# Patient Record
Sex: FEMALE | Race: WHITE | NOT HISPANIC OR LATINO | Employment: FULL TIME | URBAN - METROPOLITAN AREA
[De-identification: names, ages, dates, MRNs, and addresses within clinical notes are randomized per-mention and may not be internally consistent; named-entity substitution may affect disease eponyms.]

---

## 2018-02-23 ENCOUNTER — OFFICE VISIT (OUTPATIENT)
Dept: PEDIATRICS | Facility: CLINIC | Age: 33
End: 2018-02-23
Payer: COMMERCIAL

## 2018-02-23 ENCOUNTER — RECORDS - HEALTHEAST (OUTPATIENT)
Dept: ADMINISTRATIVE | Facility: OTHER | Age: 33
End: 2018-02-23

## 2018-02-23 VITALS
HEIGHT: 64 IN | SYSTOLIC BLOOD PRESSURE: 132 MMHG | BODY MASS INDEX: 26.7 KG/M2 | DIASTOLIC BLOOD PRESSURE: 77 MMHG | WEIGHT: 156.4 LBS | TEMPERATURE: 98.8 F

## 2018-02-23 DIAGNOSIS — Z00.00 ROUTINE GENERAL MEDICAL EXAMINATION AT A HEALTH CARE FACILITY: Primary | ICD-10-CM

## 2018-02-23 DIAGNOSIS — L30.9 ECZEMA, UNSPECIFIED TYPE: ICD-10-CM

## 2018-02-23 LAB
HPV_EXT - HISTORICAL: NORMAL
PAP SMEAR - HIM PATIENT REPORTED: NORMAL

## 2018-02-23 PROCEDURE — 99395 PREV VISIT EST AGE 18-39: CPT | Performed by: NURSE PRACTITIONER

## 2018-02-23 PROCEDURE — G0145 SCR C/V CYTO,THINLAYER,RESCR: HCPCS | Performed by: NURSE PRACTITIONER

## 2018-02-23 PROCEDURE — 87624 HPV HI-RISK TYP POOLED RSLT: CPT | Performed by: NURSE PRACTITIONER

## 2018-02-23 RX ORDER — TRIAMCINOLONE ACETONIDE 1 MG/G
1 OINTMENT TOPICAL DAILY
Qty: 30 G | Refills: 3 | Status: SHIPPED | OUTPATIENT
Start: 2018-02-23 | End: 2021-07-15

## 2018-02-23 NOTE — PROGRESS NOTES
SUBJECTIVE:   CC: Arlyn Escalera is an 33 year old woman who presents for preventive health visit.     Physical   Annual:     Getting at least 3 servings of Calcium per day::  Yes    Bi-annual eye exam::  Yes    Dental care twice a year::  NO    Sleep apnea or symptoms of sleep apnea::  None    Diet::  Regular (no restrictions)    Frequency of exercise::  4-5 days/week    Duration of exercise::  45-60 minutes    Taking medications regularly::  Yes    Medication side effects::  None    Additional concerns today::  YES            Hx of eczmea and had been seen by derm and treated with triamcinolone. Would like refill.     Is not on the pill, just got  and declines birth control.     Just got  last fall. Works at [employer]. Does [place], likes this for exercise.     Today's PHQ-2 Score:   PHQ-2 ( 1999 Pf[employer]) 2/23/2018   Q1: Little interest or pleasure in doing things 0   Q2: Feeling down, depressed or hopeless 0   PHQ-2 Score 0   Q1: Little interest or pleasure in doing things Not at all   Q2: Feeling down, depressed or hopeless Not at all   PHQ-2 Score 0       Abuse: Current or Past(Physical, Sexual or Emotional)- No  Do you feel safe in your environment - Yes    Social History   Substance Use Topics     Smoking status: Never Smoker     Smokeless tobacco: Never Used     Alcohol use 0.0 oz/week     0 Standard drinks or equivalent per week      Comment: socially     Alcohol Use 2/23/2018   If you drink alcohol, do you typically have greater than 3 drinks per day OR greater than 7 drinks per week?   No   No flowsheet data found.    Reviewed orders with patient.  Reviewed health maintenance and updated orders accordingly - Yes  Labs reviewed in EPIC    Mammogram not appropriate for this patient based on age.    Pertinent mammograms are reviewed under the imaging tab.  History of abnormal Pap smear: NO - age 30-65 PAP every 5 years with negative HPV co-testing recommended    Reviewed and updated as needed this  "visit by clinical staff         Reviewed and updated as needed this visit by Provider            Review of Systems  C: NEGATIVE for fever, chills, change in weight  I: NEGATIVE for worrisome rashes, moles or lesions  E: NEGATIVE for vision changes or irritation  ENT: NEGATIVE for ear, mouth and throat problems  R: NEGATIVE for significant cough or SOB  B: NEGATIVE for masses, tenderness or discharge  CV: NEGATIVE for chest pain, palpitations or peripheral edema  GI: NEGATIVE for nausea, abdominal pain, heartburn, or change in bowel habits  : NEGATIVE for unusual urinary or vaginal symptoms. Periods are regular.  M: NEGATIVE for significant arthralgias or myalgia  N: NEGATIVE for weakness, dizziness or paresthesias  P: NEGATIVE for changes in mood or affect     OBJECTIVE:   /77  Temp 98.8  F (37.1  C) (Tympanic)  Ht 5' 3.5\" (1.613 m)  Wt 156 lb 6.4 oz (70.9 kg)  LMP 01/30/2018  BMI 27.27 kg/m2  Physical Exam  GENERAL: healthy, alert and no distress  EYES: Eyes grossly normal to inspection, PERRL and conjunctivae and sclerae normal  HENT: ear canals and TM's normal, nose and mouth without ulcers or lesions  NECK: no adenopathy, no asymmetry, masses, or scars and thyroid normal to palpation  RESP: lungs clear to auscultation - no rales, rhonchi or wheezes  CV: regular rate and rhythm, normal S1 S2, no S3 or S4, no murmur, click or rub, no peripheral edema and peripheral pulses strong  ABDOMEN: soft, nontender, no hepatosplenomegaly, no masses and bowel sounds normal   (female): normal female external genitalia, normal urethral meatus, vaginal mucosa, normal cervix/adnexa/uterus without masses or discharge  MS: no gross musculoskeletal defects noted, no edema  SKIN: no suspicious lesions or rashes  PSYCH: mentation appears normal, affect normal/bright    ASSESSMENT/PLAN:   1. Routine general medical examination at a health care facility    - Pap imaged thin layer screen with HPV - recommended age 30 - " "65  - HPV High Risk Types DNA Cervical    2. Eczema, unspecified type  follow up with derm as scheduled.   - triamcinolone (KENALOG) 0.1 % ointment; Apply 1 g topically daily  Dispense: 30 g; Refill: 3    COUNSELING:  Reviewed preventive health counseling, as reflected in patient instructions  Special attention given to:        Regular exercise       Healthy diet/nutrition         reports that she has never smoked. She has never used smokeless tobacco.    Estimated body mass index is 27.27 kg/(m^2) as calculated from the following:    Height as of this encounter: 5' 3.5\" (1.613 m).    Weight as of this encounter: 156 lb 6.4 oz (70.9 kg).       Counseling Resources:  ATP IV Guidelines  Pooled Cohorts Equation Calculator  Breast Cancer Risk Calculator  FRAX Risk Assessment  ICSI Preventive Guidelines  Dietary Guidelines for Americans, 2010  USDA's MyPlate  ASA Prophylaxis  Lung CA Screening    IVA [facility]  "

## 2018-02-23 NOTE — MR AVS SNAPSHOT
After Visit Summary   2/23/2018    Arlyn Escalera    MRN: [id number]           Patient Information     Date Of Birth          1985        Visit Information        Provider Department      2/23/2018 1:15 PM Nilam Conde APRN [facility]        Today's Diagnoses     Routine general medical examination at a health care facility    -  1    Eczema, unspecified type          Care Instructions      Preventive Health Recommendations  Female Ages 26 - 39  Yearly exam:   See your health care provider every year in order to    Review health changes.     Discuss preventive care.      Review your medicines if you your doctor has prescribed any.    Until age 30: Get a Pap test every three years (more often if you have had an abnormal result).    After age 30: Talk to your doctor about whether you should have a Pap test every 3 years or have a Pap test with HPV screening every 5 years.   You do not need a Pap test if your uterus was removed (hysterectomy) and you have not had cancer.  You should be tested each year for STDs (sexually transmitted diseases), if you're at risk.   Talk to your provider about how often to have your cholesterol checked.  If you are at risk for diabetes, you should have a diabetes test (fasting glucose).  Shots: Get a flu shot each year. Get a tetanus shot every 10 years.   Nutrition:     Eat at least 5 servings of fruits and vegetables each day.    Eat whole-grain bread, whole-wheat pasta and brown rice instead of white grains and rice.    Talk to your provider about Calcium and Vitamin D.     Lifestyle    Exercise at least 150 minutes a week (30 minutes a day, 5 days of the week). This will help you control your weight and prevent disease.    Limit alcohol to one drink per day.    No smoking.     Wear sunscreen to prevent skin cancer.    See your dentist every six months for an exam and cleaning.            Follow-ups after your visit        Your next 10  "appointments already scheduled     Mar 29, 2018 12:15 PM CDT   (Arrive by 12:00 PM)   Return Visit with Jerome Cohen MD   [facility] ([facility])    [address] [address] [phone]              Who to contact     If you have questions or need follow up information about today's clinic visit or your schedule please contact [facility] directly at [phone].  Normal or non-critical lab and imaging results will be communicated to you by iConnect CRMhart, letter or phone within 4 business days after the clinic has received the results. If you do not hear from us within 7 days, please contact the clinic through iConnect CRMhart or phone. If you have a critical or abnormal lab result, we will notify you by phone as soon as possible.  Submit refill requests through SEDLine or call your pharmacy and they will forward the refill request to us. Please allow 3 business days for your refill to be completed.          Additional Information About Your Visit        iConnect CRMhart Information     SEDLine gives you secure access to your electronic health record. If you see a primary care provider, you can also send messages to your care team and make appointments. If you have questions, please call your primary care clinic.  If you do not have a primary care provider, please call [phone] and they will assist you.        Care EveryWhere ID     This is your Care EveryWhere ID. This could be used by other organizations to access your [facility] medical records  [id number]        Your Vitals Were     Temperature Height Last Period BMI (Body Mass Index)          98.8  F (37.1  C) (Tympanic) 5' 3.5\" (1.613 m) 01/30/2018 27.27 kg/m2         Blood Pressure from Last 3 Encounters:   02/23/18 132/77   12/15/16 120/80   03/09/16 126/79    Weight from Last 3 Encounters:   02/23/18 156 lb 6.4 oz (70.9 kg)   12/15/16 159 lb 8 oz (72.3 kg)   03/09/16 147 lb (66.7 kg)    "           We Performed the Following     HPV High Risk Types DNA Cervical     Pap imaged thin layer screen with HPV - recommended age 30 - 65          Today's Medication Changes          These changes are accurate as of 2/23/18  1:43 PM.  If you have any questions, ask your nurse or doctor.               These medicines have changed or have updated prescriptions.        Dose/Directions    triamcinolone 0.1 % ointment   Commonly known as:  KENALOG   This may have changed:  how much to take   Used for:  Eczema, unspecified type   Changed by:  Nilam Conde APRN CNP        Dose:  1 g   Apply 1 g topically daily   Quantity:  30 g   Refills:  3            Where to get your medicines      These medications were sent to [facility] [place] [facility] [place] [place] - [address]   [address], [place] [address]     Phone:  [phone]     triamcinolone 0.1 % ointment                Primary Care Provider Office Phone # Fax #    IVA Beltran [phone]       [address] [place] [address]        Equal Access to Services     [facility]: Hadii aad ku hadasho Soomaali, waaxda luqadaha, qaybta kaalmada adeegyada, waxay mayur sanches . So [facility] [phone].    ATENCIÓN: Si habla español, tiene a leonard disposición servicios gratuitos de asistencia lingüística. Llame al [phone].    We comply with applicable federal civil rights laws and [place] laws. We do not discriminate on the basis of race, color, national origin, age, disability, sex, sexual orientation, or gender identity.            Thank you!     Thank you for choosing [facility]  for your care. Our goal is always to provide you with excellent care. Hearing back from our patients is one way we can continue to improve our services. Please take a few minutes to complete the written survey that you may receive in the mail after your visit with us.  Thank you!             Your Updated Medication List - Protect others around you: Learn how to safely use, store and throw away your medicines at www.disposemymeds.org.          This list is accurate as of 2/23/18  1:43 PM.  Always use your most recent med list.                   Brand Name Dispense Instructions for use Diagnosis    eucerin cream     80 g    Apply topically as needed for dry skin Or whatever size covered by insurance    Eczema       fluocinonide 0.05 % solution    LIDEX    60 mL    Apply topicaly twice daily as needed for scalp itching/dermatitis.    Other eczema       fluticasone 50 MCG/ACT spray    FLONASE    1 g    Spray 2 sprays into both nostrils daily    Allergic rhinitis due to pollen       hydrOXYzine 25 MG tablet    ATARAX    60 tablet    Take 1-2 tablets (25-50 mg) by mouth every 6 hours as needed for itching    Rash, Eczema, unspecified eczema       LORazepam 0.5 MG tablet    ATIVAN    30 Tab    ONE TABLET AS NEEDED FOR PANIC ATTACKS.  USE SPARINGLY AND ONLY AS NEEDED.    Panic attack       norethindrone-ethinyl estradiol 1-20 MG-MCG per tablet    MICROGESTIN 1/20    90 tablet    Take 1 tablet by mouth daily    Encounter for surveillance of contraceptive pills       triamcinolone 0.1 % ointment    KENALOG    30 g    Apply 1 g topically daily    Eczema, unspecified type

## 2018-02-27 LAB
COPATH REPORT: NORMAL
PAP: NORMAL

## 2018-03-01 LAB
FINAL DIAGNOSIS: NORMAL
HPV HR 12 DNA CVX QL NAA+PROBE: NEGATIVE
HPV16 DNA SPEC QL NAA+PROBE: NEGATIVE
HPV18 DNA SPEC QL NAA+PROBE: NEGATIVE
SPECIMEN DESCRIPTION: NORMAL
SPECIMEN SOURCE CVX/VAG CYTO: NORMAL

## 2018-11-23 ENCOUNTER — OFFICE VISIT (OUTPATIENT)
Dept: DERMATOLOGY | Facility: CLINIC | Age: 33
End: 2018-11-23
Payer: COMMERCIAL

## 2018-11-23 ENCOUNTER — RECORDS - HEALTHEAST (OUTPATIENT)
Dept: ADMINISTRATIVE | Facility: OTHER | Age: 33
End: 2018-11-23

## 2018-11-23 DIAGNOSIS — L20.84 INTRINSIC ECZEMA: ICD-10-CM

## 2018-11-23 DIAGNOSIS — L30.8 OTHER ECZEMA: ICD-10-CM

## 2018-11-23 DIAGNOSIS — L29.9 GENERALIZED PRURITUS: Primary | ICD-10-CM

## 2018-11-23 DIAGNOSIS — L20.84 INTRINSIC ATOPIC DERMATITIS: Primary | ICD-10-CM

## 2018-11-23 DIAGNOSIS — R21 RASH: ICD-10-CM

## 2018-11-23 RX ORDER — FLUOCINONIDE TOPICAL SOLUTION USP, 0.05% 0.5 MG/ML
SOLUTION TOPICAL
Qty: 60 ML | Refills: 2 | Status: SHIPPED | OUTPATIENT
Start: 2018-11-23 | End: 2021-07-15

## 2018-11-23 RX ORDER — HYDROXYZINE HYDROCHLORIDE 25 MG/1
25-50 TABLET, FILM COATED ORAL EVERY 6 HOURS PRN
Qty: 60 TABLET | Refills: 1 | Status: SHIPPED | OUTPATIENT
Start: 2018-11-23 | End: 2018-11-23

## 2018-11-23 ASSESSMENT — PAIN SCALES - GENERAL: PAINLEVEL: NO PAIN (0)

## 2018-11-23 NOTE — NURSING NOTE
"Chief Complaint   Patient presents with     Derm Problem     Arlyn is here today for eczema. She states \" a few weeks ago my skin started to flare. It is mostly on my back\"     TAISHA Bernard  "

## 2018-11-23 NOTE — MR AVS SNAPSHOT
After Visit Summary   11/23/2018    Arlyn Escalera    MRN: [id number]           Patient Information     Date Of Birth          1985        Visit Information        Provider Department      11/23/2018 12:30 PM Zeynep Arellano PA-C [facility]         Follow-ups after your visit        Your next 10 appointments already scheduled     Nov 27, 2018  1:00 PM CST   Nurse Only with EA PRENATAL NURSE   [facility] ([facility])    [address] [place] [address]  [address]  [facility] [address] [phone]            Dec 14, 2018  2:30 PM CST   (Arrive by 2:15 PM)   Return Visit with [facility] ([facility])    [address]  [address] [facility] [address] [facility] [address] [phone]              Who to contact     Please call your clinic at [phone] to:    Ask questions about your health    Make or cancel appointments    Discuss your medicines    Learn about your test results    Speak to your doctor            Additional Information About Your Visit        MyChart Information     [employer] gives you secure access to your electronic health record. If you see a primary care provider, you can also send messages to your care team and make appointments. If you have questions, please call your primary care clinic.  If you do not have a primary care provider, please call [phone] and they will assist you.      [employer] is an electronic gateway that provides easy, online access to your medical records. With [employer], you can request a clinic appointment, read your test results, renew a prescription or communicate with your care team.     To access your existing account, please contact your [facility] or call [phone] for assistance.        Care EveryWhere ID     This is your Care EveryWhere ID. This could be used by other organizations to access your [facility]  records  [id number]         Blood Pressure from Last 3 Encounters:   02/23/18 132/77   12/15/16 120/80   03/09/16 126/79    Weight from Last 3 Encounters:   02/23/18 70.9 kg (156 lb 6.4 oz)   12/15/16 72.3 kg (159 lb 8 oz)   03/09/16 66.7 kg (147 lb)              Today, you had the following     No orders found for display         Today's Medication Changes          These changes are accurate as of 11/23/18  1:02 PM.  If you have any questions, ask your nurse or doctor.               Stop taking these medicines if you haven't already. Please contact your care team if you have questions.     hydrOXYzine 25 MG tablet   Commonly known as:  ATARAX   Stopped by:  Zeynep Arellano PA-C                Where to get your medicines      These medications were sent to [facility] - [address]   [address], [place] [address]     Phone:  [phone]     eucerin cream    fluocinonide 0.05 % solution                Primary Care Provider Office Phone # Fax #    IVA Beltran [phone]       [address] [place] [address]        Equal Access to Services     [facility]: Hadii aad ku hadasho Soomaali, waaxda luqadaha, qaybta kaalmada adeegyada, waxay supriyain hayannemarien josr sanches . So [facility] [phone].    ATENCIÓN: Si habla español, tiene a leonard disposición servicios gratuitos de asistencia lingüística. Llame al [phone].    We comply with applicable federal civil rights laws and [place] laws. We do not discriminate on the basis of race, color, national origin, age, disability, sex, sexual orientation, or gender identity.            Thank you!     Thank you for choosing [facility]  for your care. Our goal is always to provide you with excellent care. Hearing back from our patients is one way we can continue to improve our services. Please take a few minutes to complete the written survey that you  may receive in the mail after your visit with us. Thank you!             Your Updated Medication List - Protect others around you: Learn how to safely use, store and throw away your medicines at www.disposemymeds.org.          This list is accurate as of 11/23/18  1:02 PM.  Always use your most recent med list.                   Brand Name Dispense Instructions for use Diagnosis    eucerin cream     454 g    Apply topically as needed for dry skin Or whatever size covered by insurance    Intrinsic eczema       fluocinonide 0.05 % solution    LIDEX    60 mL    Apply topicaly twice daily as needed for scalp itching/dermatitis.    Other eczema       fluticasone 50 MCG/ACT spray    FLONASE    1 g    Spray 2 sprays into both nostrils daily    Allergic rhinitis due to pollen       LORazepam 0.5 MG tablet    ATIVAN    30 Tab    ONE TABLET AS NEEDED FOR PANIC ATTACKS.  USE SPARINGLY AND ONLY AS NEEDED.    Panic attack       norethindrone-ethinyl estradiol 1-20 MG-MCG per tablet    MICROGESTIN 1/20    90 tablet    Take 1 tablet by mouth daily    Encounter for surveillance of contraceptive pills       triamcinolone 0.1 % ointment    KENALOG    30 g    Apply 1 g topically daily    Eczema, unspecified type

## 2018-11-23 NOTE — LETTER
"11/23/2018       RE: Arlyn Escalera  [address] [place] [address]     Dear Colleague,    Thank you for referring your patient, Arlyn Escalera, to the [facility] DERMATOLOGY at [facility]. Please see a copy of my visit note below.    [facility] Dermatology Note      Dermatology Problem List:  1.Atopic Dermatitis: Lidex 0.05% solution, TMC 0.1% ointment, Eucerin cream   - Previous Tx:  OTC hydrocortisone, Eucerin cream  2. Currently 7 weeks pregnant - first pregnancy    Currently need to avoid hydroxyzine due to being in 1st trimester of pregnancy    Encounter Date: Nov 23, 2018    CC:  Chief Complaint   Patient presents with     Derm Problem     Arlyn is here today for eczema. She states \" a few weeks ago my skin started to flare. It is mostly on my back\"         History of Present Illness:  Ms. Arlyn Escalera is a 33 year old female who presents as a follow-up for eczema. The patient was last seen 03/16/2016 when the patient was recommended to continue her triamcinolone 0.1% ointment then apply Eucerin afterwards, and to continue applying Hydrocortisone Cream OTC to face for active lesion. At today's visit the patient states that a few weeks ago she noticed her skin started to flare. She has noticed these flares all around her body but mostly on the back. The patient states she usually sees these symptoms in January and February, but because the weather got colder earlier she thinks that is flaring her. She is currently 7 weeks pregnant with first baby. The patient has been using Eucerin, OTC Hydrocortisone, and natalee. She states that they hydrocortisone has not been very helpful. She is using a body wash from  [employer] but it is not the free and clear she usually uses. She has a history of seasonal allergies. Patient states that she is 7 weeks pregnant and was wondering if any of the medications that will be prescribed will effect her pregnancy. The " patient denies painful, itching, tingling or bleeding lesions unless otherwise noted.      Past Medical History:   Patient Active Problem List   Diagnosis     CARDIOVASCULAR SCREENING; LDL GOAL LESS THAN 160     Allergic conjunctivitis     Seasonal allergies     Blepharitis, unspecified laterality     Eczema, unspecified type     Past Medical History:   Diagnosis Date     Anxiety     mostly in college     Spleen laceration 2002    hockey     Past Surgical History:   Procedure Laterality Date     APPENDECTOMY      appendectomy in 2000       Social History:   reports that she has never smoked. She has never used smokeless tobacco. She reports that she drinks alcohol. She reports that she does not use illicit drugs.    Family History:  Family History   Problem Relation Age of Onset     C.A.D. Father      MI mild at age 50     Seasonal/Environmental Allergies Father      Skin Cancer No family hx of      Glaucoma No family hx of      Macular Degeneration No family hx of      Melanoma No family hx of        Medications:  Current Outpatient Prescriptions   Medication Sig Dispense Refill     fluticasone (FLONASE) 50 MCG/ACT nasal spray Spray 2 sprays into both nostrils daily 1 g 5     LORAZEPAM 0.5 MG PO TABS ONE TABLET AS NEEDED FOR PANIC ATTACKS.  USE SPARINGLY AND ONLY AS NEEDED. 30 Tab 0     Skin Protectants, Misc. (EUCERIN) cream Apply topically as needed for dry skin Or whatever size covered by insurance 80 g 1     triamcinolone (KENALOG) 0.1 % ointment Apply 1 g topically daily 30 g 3     fluocinonide (LIDEX) 0.05 % external solution Apply topicaly twice daily as needed for scalp itching/dermatitis. (Patient not taking: Reported on 11/23/2018) 60 mL 5     hydrOXYzine (ATARAX) 25 MG tablet Take 1-2 tablets (25-50 mg) by mouth every 6 hours as needed for itching (Patient not taking: Reported on 11/23/2018) 60 tablet 1     norethindrone-ethinyl estradiol (MICROGESTIN 1/20) 1-20 MG-MCG per tablet Take 1 tablet by mouth  "daily (Patient not taking: Reported on 11/23/2018) 90 tablet prn       Allergies   Allergen Reactions     Keppra Unknown     Morphine [Morphine Sulfate] Unknown       Review of Systems:  -Constitutional: The patient denies fatigue, fevers, chills, unintended weight loss, and night sweats.  -Skin: As above in HPI. No additional skin concerns.    Physical exam:  Vitals: There were no vitals taken for this visit.  GEN: This is a well developed, well-nourished female in no acute distress, in a pleasant mood.    SKIN: Waist-up skin, which includes the head/face, neck, both arms, chest, back, abdomen, digits and/or nails was examined.  -There are pink scaly patches and plaques on the back, antecubital fossa, abdomen, posterior scalp and neck, bilateral thighs and popliteal fossa - excoriations on the back  -No other lesions of concern on areas examined.     Impression/Plan:  1. Atopic dermatitis, currently flaring - winter weather and possibly pregnancy may be playing a role    Refilled Lidex 0.05% solution, apply topically twice a daily as needed for scalp itching/dermatitis     Continue Eucerin cream, apply topically as needed for dry skin or whatever size covered by insurance     Discussed witching to Cetaphil soap and CeraVe moisturizer if she would like to try something else    Need to avoid hydroxyzine due to the fact that she is on her first trimester of pregnancy.    Reminded patient of SE of topical steroids and to avoid using them on large areas of the body    May use emollients BID or TID and then topical steroids only on \"hot spots\"     CC Dr. Salter on close of this encounter.  Follow-up in 2 to 3 weeks, earlier for new or changing lesions.       Staff Involved:  Staff/Scribe    Scribe Disclosure:  Angie CABRERA, am serving as a scribe to document services personally performed by Zeynep Arellano PA-C, based on data collection and the provider's statements to me.     Provider Disclosure:   The " documentation recorded by the scribe accurately reflects the services I personally performed and the decisions made by me.    All risks, benefits and alternatives were discussed with patient.  Patient is in agreement and understands the assessment and plan.  All questions were answered.    Zeynep Arellano PA-C  [facility]: Phone: [phone], Fax: [phone]

## 2018-11-23 NOTE — PROGRESS NOTES
"[facility] Dermatology Note      Dermatology Problem List:  1.Atopic Dermatitis: Lidex 0.05% solution, TMC 0.1% ointment, Eucerin cream   - Previous Tx:  OTC hydrocortisone, Eucerin cream  2. Currently 7 weeks pregnant - first pregnancy    Currently need to avoid hydroxyzine due to being in 1st trimester of pregnancy    Encounter Date: Nov 23, 2018    CC:  Chief Complaint   Patient presents with     Derm Problem     Arlyn is here today for eczema. She states \" a few weeks ago my skin started to flare. It is mostly on my back\"         History of Present Illness:  Ms. Arlyn Escalera is a 33 year old female who presents as a follow-up for eczema. The patient was last seen 03/16/2016 when the patient was recommended to continue her triamcinolone 0.1% ointment then apply Eucerin afterwards, and to continue applying Hydrocortisone Cream OTC to face for active lesion. At today's visit the patient states that a few weeks ago she noticed her skin started to flare. She has noticed these flares all around her body but mostly on the back. The patient states she usually sees these symptoms in January and February, but because the weather got colder earlier she thinks that is flaring her. She is currently 7 weeks pregnant with first baby. The patient has been using Eucerin, OTC Hydrocortisone, and natalee. She states that they hydrocortisone has not been very helpful. She is using a body wash from  [employer] but it is not the free and clear she usually uses. She has a history of seasonal allergies. Patient states that she is 7 weeks pregnant and was wondering if any of the medications that will be prescribed will effect her pregnancy. The patient denies painful, itching, tingling or bleeding lesions unless otherwise noted.      Past Medical History:   Patient Active Problem List   Diagnosis     CARDIOVASCULAR SCREENING; LDL GOAL LESS THAN 160     Allergic conjunctivitis     Seasonal allergies     Blepharitis, " unspecified laterality     Eczema, unspecified type     Past Medical History:   Diagnosis Date     Anxiety     mostly in college     Spleen laceration 2002    hockey     Past Surgical History:   Procedure Laterality Date     APPENDECTOMY      appendectomy in 2000       Social History:   reports that she has never smoked. She has never used smokeless tobacco. She reports that she drinks alcohol. She reports that she does not use illicit drugs.    Family History:  Family History   Problem Relation Age of Onset     C.A.D. Father      MI mild at age 50     Seasonal/Environmental Allergies Father      Skin Cancer No family hx of      Glaucoma No family hx of      Macular Degeneration No family hx of      Melanoma No family hx of        Medications:  Current Outpatient Prescriptions   Medication Sig Dispense Refill     fluticasone (FLONASE) 50 MCG/ACT nasal spray Spray 2 sprays into both nostrils daily 1 g 5     LORAZEPAM 0.5 MG PO TABS ONE TABLET AS NEEDED FOR PANIC ATTACKS.  USE SPARINGLY AND ONLY AS NEEDED. 30 Tab 0     Skin Protectants, Misc. (EUCERIN) cream Apply topically as needed for dry skin Or whatever size covered by insurance 80 g 1     triamcinolone (KENALOG) 0.1 % ointment Apply 1 g topically daily 30 g 3     fluocinonide (LIDEX) 0.05 % external solution Apply topicaly twice daily as needed for scalp itching/dermatitis. (Patient not taking: Reported on 11/23/2018) 60 mL 5     hydrOXYzine (ATARAX) 25 MG tablet Take 1-2 tablets (25-50 mg) by mouth every 6 hours as needed for itching (Patient not taking: Reported on 11/23/2018) 60 tablet 1     norethindrone-ethinyl estradiol (MICROGESTIN 1/20) 1-20 MG-MCG per tablet Take 1 tablet by mouth daily (Patient not taking: Reported on 11/23/2018) 90 tablet prn       Allergies   Allergen Reactions     Keppra Unknown     Morphine [Morphine Sulfate] Unknown       Review of Systems:  -Constitutional: The patient denies fatigue, fevers, chills, unintended weight loss, and  "night sweats.  -Skin: As above in HPI. No additional skin concerns.    Physical exam:  Vitals: There were no vitals taken for this visit.  GEN: This is a well developed, well-nourished female in no acute distress, in a pleasant mood.    SKIN: Waist-up skin, which includes the head/face, neck, both arms, chest, back, abdomen, digits and/or nails was examined.  -There are pink scaly patches and plaques on the back, antecubital fossa, abdomen, posterior scalp and neck, bilateral thighs and popliteal fossa - excoriations on the back  -No other lesions of concern on areas examined.     Impression/Plan:  1. Atopic dermatitis, currently flaring - winter weather and possibly pregnancy may be playing a role    Refilled Lidex 0.05% solution, apply topically twice a daily as needed for scalp itching/dermatitis     Continue Eucerin cream, apply topically as needed for dry skin or whatever size covered by insurance     Discussed witching to Cetaphil soap and CeraVe moisturizer if she would like to try something else    Need to avoid hydroxyzine due to the fact that she is on her first trimester of pregnancy.    Reminded patient of SE of topical steroids and to avoid using them on large areas of the body    May use emollients BID or TID and then topical steroids only on \"hot spots\"     CC Dr. Salter on close of this encounter.  Follow-up in 2 to 3 weeks, earlier for new or changing lesions.       Staff Involved:  Staff/Scribe    Scribe Disclosure:  I, Angie Aponte, am serving as a scribe to document services personally performed by Zeynep Arellano PA-C, based on data collection and the provider's statements to me.     Provider Disclosure:   The documentation recorded by the scribe accurately reflects the services I personally performed and the decisions made by me.    All risks, benefits and alternatives were discussed with patient.  Patient is in agreement and understands the assessment and plan.  All questions were " answered.    Zeynep Arellano PA-C  [facility]: Phone: [phone], Fax: [phone]

## 2018-11-27 ENCOUNTER — PRENATAL OFFICE VISIT (OUTPATIENT)
Dept: NURSING | Facility: CLINIC | Age: 33
End: 2018-11-27
Payer: COMMERCIAL

## 2018-11-27 VITALS
SYSTOLIC BLOOD PRESSURE: 110 MMHG | BODY MASS INDEX: 28 KG/M2 | WEIGHT: 164 LBS | HEIGHT: 64 IN | DIASTOLIC BLOOD PRESSURE: 50 MMHG | HEART RATE: 84 BPM

## 2018-11-27 DIAGNOSIS — Z34.01 ENCOUNTER FOR SUPERVISION OF NORMAL FIRST PREGNANCY IN FIRST TRIMESTER: Primary | ICD-10-CM

## 2018-11-27 LAB
ABO + RH BLD: NORMAL
ABO + RH BLD: NORMAL
ABORH_EXT (HISTORICAL CONVERSION): NORMAL
ANTIBODY_EXT (HISTORICAL CONVERSION): NEGATIVE
BLD GP AB SCN SERPL QL: NORMAL
BLOOD BANK CMNT PATIENT-IMP: NORMAL
ERYTHROCYTE [DISTWIDTH] IN BLOOD BY AUTOMATED COUNT: 11.4 % (ref 10–15)
HBSAG_EXT (HISTORICAL CONVERSION): NORMAL
HCT VFR BLD AUTO: 38 % (ref 35–47)
HGB BLD-MCNC: 13.5 G/DL (ref 11.7–15.7)
HGB_EXT (HISTORICAL CONVERSION): 13.5
HIV_EXT: NORMAL
MCH RBC QN AUTO: 29.3 PG (ref 26.5–33)
MCHC RBC AUTO-ENTMCNC: 35.5 G/DL (ref 31.5–36.5)
MCV RBC AUTO: 82 FL (ref 78–100)
PLATELET # BLD AUTO: 303 10E9/L (ref 150–450)
PLT_EXT - HISTORICAL: 303
RBC # BLD AUTO: 4.61 10E12/L (ref 3.8–5.2)
RPR - HISTORICAL: NORMAL
RUBELLA_EXT (HISTORICAL CONVERSION): NORMAL
SPECIMEN EXP DATE BLD: NORMAL
WBC # BLD AUTO: 15.1 10E9/L (ref 4–11)

## 2018-11-27 PROCEDURE — 86762 RUBELLA ANTIBODY: CPT | Performed by: ADVANCED PRACTICE MIDWIFE

## 2018-11-27 PROCEDURE — 87340 HEPATITIS B SURFACE AG IA: CPT | Performed by: ADVANCED PRACTICE MIDWIFE

## 2018-11-27 PROCEDURE — 86850 RBC ANTIBODY SCREEN: CPT | Performed by: ADVANCED PRACTICE MIDWIFE

## 2018-11-27 PROCEDURE — 86900 BLOOD TYPING SEROLOGIC ABO: CPT | Performed by: ADVANCED PRACTICE MIDWIFE

## 2018-11-27 PROCEDURE — 85027 COMPLETE CBC AUTOMATED: CPT | Performed by: ADVANCED PRACTICE MIDWIFE

## 2018-11-27 PROCEDURE — 87389 HIV-1 AG W/HIV-1&-2 AB AG IA: CPT | Performed by: ADVANCED PRACTICE MIDWIFE

## 2018-11-27 PROCEDURE — 36415 COLL VENOUS BLD VENIPUNCTURE: CPT | Performed by: ADVANCED PRACTICE MIDWIFE

## 2018-11-27 PROCEDURE — 87086 URINE CULTURE/COLONY COUNT: CPT | Performed by: ADVANCED PRACTICE MIDWIFE

## 2018-11-27 PROCEDURE — 86780 TREPONEMA PALLIDUM: CPT | Performed by: ADVANCED PRACTICE MIDWIFE

## 2018-11-27 PROCEDURE — 86901 BLOOD TYPING SEROLOGIC RH(D): CPT | Performed by: ADVANCED PRACTICE MIDWIFE

## 2018-11-27 PROCEDURE — 99207 ZZC NO CHARGE NURSE ONLY: CPT

## 2018-11-27 RX ORDER — PRENATAL VIT/IRON FUM/FOLIC AC 27MG-0.8MG
1 TABLET ORAL DAILY
Qty: 100 TABLET | Refills: 3
Start: 2018-11-27 | End: 2021-09-28

## 2018-11-27 NOTE — MR AVS SNAPSHOT
After Visit Summary   11/27/2018    Arlyn Escalera    MRN: [id number]           Patient Information     Date Of Birth          1985        Visit Information        Provider Department      11/27/2018 1:00 PM EA PRENATAL NURSE [facility]        Today's Diagnoses     Encounter for supervision of normal first pregnancy in first trimester    -  1       Follow-ups after your visit        Your next 10 appointments already scheduled     Dec 07, 2018 12:30 PM CST   Ultrasound with OXUS1   [facility]    [address] [id number] [phone]            Dec 14, 2018  2:30 PM CST   (Arrive by 2:15 PM)   Return Visit with Zeynep Arellano PA-C   [facility] ([facility])    [address] [place] [address]  3rd [facility] [address] [phone]            Dec 17, 2018  5:00 PM CST   New Prenatal with IVA Gaming CNM   [facility] ([facility])    [address] [facility]  [address]  [facility] [id number] [phone]              Future tests that were ordered for you today     Open Future Orders        Priority Expected Expires Ordered    US OB < 14 weeks, single,  for dating ([id number]) Routine  2/25/2019 11/27/2018            Who to contact     If you have questions or need follow up information about today's clinic visit or your schedule please contact [facility] directly at [phone].  Normal or non-critical lab and imaging results will be communicated to you by MyChart, letter or phone within 4 business days after the clinic has received the results. If you do not hear from us within 7 days, please contact the clinic through MyChart or phone. If you have a critical or abnormal lab result, we will notify you by phone as soon as possible.  Submit refill requests through [employer] or call your pharmacy and they will forward  "the refill request to us. Please allow 3 business days for your refill to be completed.          Additional Information About Your Visit        [employer] Information     [employer] gives you secure access to your electronic health record. If you see a primary care provider, you can also send messages to your care team and make appointments. If you have questions, please call your primary care clinic.  If you do not have a primary care provider, please call [phone] and they will assist you.        Care EveryWhere ID     This is your Care EveryWhere ID. This could be used by other organizations to access your [place] medical records  [id number]        Your Vitals Were     Pulse Height Last Period BMI (Body Mass Index)          84 5' 3.5\" (1.613 m) 10/04/2018 28.6 kg/m2         Blood Pressure from Last 3 Encounters:   11/27/18 110/50   02/23/18 132/77   12/15/16 120/80    Weight from Last 3 Encounters:   11/27/18 164 lb (74.4 kg)   02/23/18 156 lb 6.4 oz (70.9 kg)   12/15/16 159 lb 8 oz (72.3 kg)              We Performed the Following     ABO/Rh type and screen     CBC with platelets     Hepatitis B surface antigen     HIV Antigen Antibody Combo     Rubella Antibody IgG Quantitative     Treponema Abs w Reflex to RPR and Titer     Urine Culture Aerobic Bacterial          Today's Medication Changes          These changes are accurate as of 11/27/18  2:10 PM.  If you have any questions, ask your nurse or doctor.               Start taking these medicines.        Dose/Directions    pediatric multivitamin w/iron 27-0.8 MG tablet   Used for:  Encounter for supervision of normal first pregnancy in first trimester        Dose:  1 tablet   Take 1 tablet by mouth daily   Quantity:  100 tablet   Refills:  3            Where to get your medicines      Some of these will need a paper prescription and others can be bought over the counter.  Ask your nurse if you have questions.     You don't need a prescription for these " medications     pediatric multivitamin w/iron 27-0.8 MG tablet                Primary Care Provider Office Phone # Fax #    IVA Beltran [phone] [address] [place] [address]        Equal Access to Services     [facility] DIMAS : Hadii aad ku hadasho Soomaali, waaxda luqadaha, qaybta kaalmada adeegyada, waxay supriyain hayannemarien adejean khza verónica elizabeth. So [facility] [phone].    ATENCIÓN: Si habla español, tiene a leonard disposición servicios gratuitos de asistencia lingüística. Llame al [phone].    We comply with applicable federal civil rights laws and [place] laws. We do not discriminate on the basis of race, color, national origin, age, disability, sex, sexual orientation, or gender identity.            Thank you!     Thank you for choosing [facility]  for your care. Our goal is always to provide you with excellent care. Hearing back from our patients is one way we can continue to improve our services. Please take a few minutes to complete the written survey that you may receive in the mail after your visit with us. Thank you!             Your Updated Medication List - Protect others around you: Learn how to safely use, store and throw away your medicines at www.disposemymeds.org.          This list is accurate as of 11/27/18  2:10 PM.  Always use your most recent med list.                   Brand Name Dispense Instructions for use Diagnosis    eucerin cream     454 g    Apply topically as needed for dry skin Or whatever size covered by insurance    Intrinsic eczema       fluocinonide 0.05 % external solution    LIDEX    60 mL    Apply topicaly twice daily as needed for scalp itching/dermatitis.    Other eczema       fluticasone 50 MCG/ACT nasal spray    FLONASE    1 g    Spray 2 sprays into both nostrils daily    Allergic rhinitis due to pollen       pediatric multivitamin w/iron 27-0.8 MG tablet     100 tablet    Take 1 tablet by mouth daily     Encounter for supervision of normal first pregnancy in first trimester       triamcinolone 0.1 % external ointment    KENALOG    30 g    Apply 1 g topically daily    Eczema, unspecified type

## 2018-11-27 NOTE — PROGRESS NOTES
"Chief Complaint   Patient presents with     Prenatal Care     New Prenatal Nurse Visit   7w5d  Estimated Date of Delivery: Jul 11, 2019      Initial /50 (BP Location: Right arm, Cuff Size: Adult Regular)  Pulse 84  Ht 5' 3.5\" (1.613 m)  Wt 164 lb (74.4 kg)  LMP 10/04/2018  BMI 28.6 kg/m2 Estimated body mass index is 28.6 kg/(m^2) as calculated from the following:    Height as of this encounter: 5' 3.5\" (1.613 m).    Weight as of this encounter: 164 lb (74.4 kg).  BP completed using cuff size: regular    Patient is accompanied by . Prenatal book and folder (containing standard educational hand-outs and brochures) given to patient. Information in folder reviewed. Questions answered.     Discussed and gave written information on options available for 1st and 2nd trimester screening, CVS, amniocentesis, AFP, and QUAD screen plus optimal time-frame for testing. Brochure given on optional screening available to determine Cystic Fibrosis carrier status. Pt instructed to check with insurance regarding coverage of these optional tests. Pt advised to call back if she desires testing or has any questions or concerns.    Prenatal labs obtained. New prenatal visit scheduled on 12/17/18 with Lily Abbott.  Kinjal Mosqueda RN               "

## 2018-11-28 LAB
BACTERIA SPEC CULT: NORMAL
HBV SURFACE AG SERPL QL IA: NONREACTIVE
HIV 1+2 AB+HIV1 P24 AG SERPL QL IA: NONREACTIVE
RUBV IGG SERPL IA-ACNC: 9 IU/ML
SPECIMEN SOURCE: NORMAL
T PALLIDUM AB SER QL: NONREACTIVE

## 2018-12-07 ENCOUNTER — RECORDS - HEALTHEAST (OUTPATIENT)
Dept: ADMINISTRATIVE | Facility: OTHER | Age: 33
End: 2018-12-07

## 2018-12-07 DIAGNOSIS — Z34.01 ENCOUNTER FOR SUPERVISION OF NORMAL FIRST PREGNANCY IN FIRST TRIMESTER: ICD-10-CM

## 2018-12-17 ENCOUNTER — RECORDS - HEALTHEAST (OUTPATIENT)
Dept: ADMINISTRATIVE | Facility: OTHER | Age: 33
End: 2018-12-17

## 2018-12-17 ENCOUNTER — PRENATAL OFFICE VISIT (OUTPATIENT)
Dept: OBGYN | Facility: CLINIC | Age: 33
End: 2018-12-17
Payer: COMMERCIAL

## 2018-12-17 VITALS — SYSTOLIC BLOOD PRESSURE: 134 MMHG | BODY MASS INDEX: 28.63 KG/M2 | WEIGHT: 164.2 LBS | DIASTOLIC BLOOD PRESSURE: 64 MMHG

## 2018-12-17 DIAGNOSIS — Z34.01 SUPERVISION OF LOW-RISK FIRST PREGNANCY, FIRST TRIMESTER: Primary | ICD-10-CM

## 2018-12-17 LAB
CHLAMYDIA_EXT- HISTORICAL: NEGATIVE
SPECIMEN DESCRIPTION_EXT (HISTORICAL CONVERSION): NORMAL

## 2018-12-17 PROCEDURE — 87491 CHLMYD TRACH DNA AMP PROBE: CPT | Performed by: ADVANCED PRACTICE MIDWIFE

## 2018-12-17 PROCEDURE — 99207 ZZC FIRST OB VISIT: CPT | Performed by: ADVANCED PRACTICE MIDWIFE

## 2018-12-17 PROCEDURE — 87591 N.GONORRHOEAE DNA AMP PROB: CPT | Performed by: ADVANCED PRACTICE MIDWIFE

## 2018-12-17 NOTE — NURSING NOTE
"Chief Complaint   Patient presents with     Prenatal Care     NPN provider visit   10w4d  Here today with spouse   No concerns     Initial /64   Wt 74.5 kg (164 lb 3.2 oz)   LMP 10/04/2018   BMI 28.63 kg/m   Estimated body mass index is 28.63 kg/m  as calculated from the following:    Height as of 18: 1.613 m (5' 3.5\").    Weight as of this encounter: 74.5 kg (164 lb 3.2 oz).  BP completed using cuff size: regular    Questioned patient about current smoking habits.  Pt. has never smoked.          The following HM Due: NONE      Chary Christopher CMA               "

## 2018-12-17 NOTE — PROGRESS NOTES
Arlyn Escalera is a 33 year old  ,  who is not a previous CNM patient. She presents for a new OB Visit. This was a planned pregnancy.     FOB is  who is in good health.  FOB IS actively involved in relationship and this pregnancy.    She has not had bleeding since her LMP.    She denies abdominal pain since her LMP.  She has had nausea.  has had vomiting.  Any personal or family history of blood clots? No  History of sickle cell anemia or trait? No         Patient's last menstrual period was 10/04/2018..  Estimated Date of Delivery: 2019 Ultrasound consistent with LMP.    MENSTRUAL HISTORY    frequency: every 28-30 days  Last PAP:  2018  History of abnormal Pap?  No    Health maintenance updated:  yes        Current medications are:    Current Outpatient Medications:      fluocinonide (LIDEX) 0.05 % solution, Apply topicaly twice daily as needed for scalp itching/dermatitis., Disp: 60 mL, Rfl: 2     fluticasone (FLONASE) 50 MCG/ACT nasal spray, Spray 2 sprays into both nostrils daily, Disp: 1 g, Rfl: 5     Prenatal Vit-Fe Fumarate-FA (PEDIATRIC MULTIVITAMIN W/IRON) 27-0.8 MG tablet, Take 1 tablet by mouth daily, Disp: 100 tablet, Rfl: 3     Skin Protectants, Misc. (EUCERIN) cream, Apply topically as needed for dry skin Or whatever size covered by insurance, Disp: 454 g, Rfl: 1     triamcinolone (KENALOG) 0.1 % ointment, Apply 1 g topically daily, Disp: 30 g, Rfl: 3     INFECTION HISTORY  HIV: No  Hepatitis B: No  Hepatitis C: No  Tuberculosis: No   Genital Herpes self: no  Herpes partner:  no  Chlamydia:  no  Gonorrhea:  no  HPV: No  BV:  No  Syphilis:  No  Chicken Pox:  Yes - had as child       OB HISTORY  Obstetric History       T0      L0     SAB0   TAB0   Ectopic0   Multiple0   Live Births0       # Outcome Date GA Lbr Varghese/2nd Weight Sex Delivery Anes PTL Lv   1 Current                   History of GDM: No,  PTL : No,  History of HTN in pregnancy:  No,  Thrombocytopenia: No,  Shoulder dystocia: No,  Vacuum Extraction: No  PPH: No   3rd of 4th degree laceration: No.   Other complications: No    PERSONAL HISTORY  Exercise Habits:  cardio 3-4 days per week.  Employment: Full time.  Her job involves light activity with little potential for toxic exposure.    Travel plans:  are international travel.  has planned trip to [place], reviewed Zika guidelines and [place] as affected country. Advised against travel.   Diet: follows a balanced nutrition diet  Abuse concerns? No  Hgb A1c screen:  BMI > 30: No, 1st degree family DM: No, History of GDM: No, PCOS: No, High risk ethnicity: No    Social History     Socioeconomic History     Marital status:      Spouse name: Tan     Number of children: 0     Years of education: 18     Highest education level: Not on file   Social Needs     Financial resource strain: Not on file     Food insecurity - worry: Not on file     Food insecurity - inability: Not on file     Transportation needs - medical: Not on file     Transportation needs - non-medical: Not on file   Occupational History     Occupation:      Employer: [employer]   Tobacco Use     Smoking status: Never Smoker     Smokeless tobacco: Never Used   Substance and Sexual Activity     Alcohol use: No     Alcohol/week: 0.0 oz     Comment: socially     Drug use: No     Sexual activity: Yes     Partners: Male     Comment: single   Other Topics Concern     Parent/sibling w/ CABG, MI or angioplasty before 65F 55M? No   Social History Narrative    Social Documentation:  Has a dog        Balanced Diet: YES    Calcium intake: 2-3 per day    Caffeine: 1-2 per day    Exercise:  type of activity cardio;  3-4 times per week    Sunscreen: Yes    Seatbelts:  Yes    Self Breast Exam:  No    Self Testicular Exam: na    Physical/Emotional/Sexual Abuse: No    Do you feel safe in your environment? Yes        Cholesterol screen up to date: no fam hx    Eye Exam up  to date: Yes    Dental Exam up to date: Yes    Pap smear up to date: Do today.  Last was 2008, nml    Mammogram up to date: Does Not Apply    Dexa Scan up to date: Does Not Apply    Colonoscopy up to date: Does Not Apply    Immunizations up to date: unsure of when    Glucose screen if over 40:  na                       She  reports that  has never smoked. she has never used smokeless tobacco.    STD testing offered?  Accepted  Last PHQ-9 score on record = No flowsheet data found.  Last GAD7 score on record =   MIHIR-7 SCORE 2015   Total Score 0     Alcohol Score = no  Referral/Meds needed? no    PAST MEDICAL/SURGICAL HISTORY  Past Medical History:   Diagnosis Date     Anxiety     mostly in college     Spleen laceration     hockey     Past Surgical History:   Procedure Laterality Date     APPENDECTOMY      appendectomy in        FAMILY HISTORY  Family History   Problem Relation Age of Onset     C.A.D. Father         MI mild at age 50     Seasonal/Environmental Allergies Father      Skin Cancer No family hx of      Glaucoma No family hx of      Macular Degeneration No family hx of      Melanoma No family hx of          ROS:  12 point review of systems negative other than symptoms noted below.      PHYSICAL EXAM  Vitals: LMP 10/04/2018   BMI= There is no height or weight on file to calculate BMI.     GENERAL:  33 year old pleasant pregnant female, alert, cooperative and well groomed.  NECK:  Thyroid without enlargement and nodules.  Lymph nodes not palpable.   LUNGS:  Clear to auscultation.  BREAST:  Deferred   HEART:  RRR without murmur.  ABDOMEN: Soft without masses or tenderness.  No scars noted..  GENITALIA: deferred   UTERUS:  nontender 10 weeks in size.  ADNEXA: Without masses or tenderness  RECTAL:  Normal appearance.  Digital exam deferred.  LOWER EXTREMITIES: No edema. No significant varicosities.    ASSESSMENT/PLAN:    IUP at 10w4d     consult for US for AMA patients: NA  Genetic Testing  reviewed and discussed, patient desires to decline. Handout provided      COUNSELING    Instructed on use of triage nurse line and contacting the on call CNM after hours in an emergency.     Symptoms of N&V and fatigue usually start to resolve around 12-16 weeks     Reviewed CNM philosophy, call schedule for labor and delivery, and [facility] for delivery    1st OB handout given outlining appointment spacing and CNM information    Reviewed exercise and nutrition    Recommend to gain 25-35 pounds with her pregnancy.    Discussed OTC medications. OB med list given    Encouraged patient to arrange  if needed    Encouraged patient to take PNV's/DHA    Travel precautions discussed, no air travel after 36 weeks and Zika Virus discussed    Will call patient with lab results when available      F/U to be addressed next visit:  return to clinic 4 weeks    Will return to the clinic in 4 weeks for her next routine prenatal check.  Will call to be seen sooner if problems arise.    IVA Dowling, CNM

## 2018-12-19 LAB
C TRACH DNA SPEC QL NAA+PROBE: NEGATIVE
N GONORRHOEA DNA SPEC QL NAA+PROBE: NEGATIVE
SPECIMEN SOURCE: NORMAL
SPECIMEN SOURCE: NORMAL

## 2019-01-15 ENCOUNTER — RECORDS - HEALTHEAST (OUTPATIENT)
Dept: ADMINISTRATIVE | Facility: OTHER | Age: 34
End: 2019-01-15

## 2019-01-15 ENCOUNTER — PRENATAL OFFICE VISIT (OUTPATIENT)
Dept: OBGYN | Facility: CLINIC | Age: 34
End: 2019-01-15
Payer: COMMERCIAL

## 2019-01-15 VITALS
SYSTOLIC BLOOD PRESSURE: 122 MMHG | WEIGHT: 163.4 LBS | HEIGHT: 64 IN | DIASTOLIC BLOOD PRESSURE: 74 MMHG | BODY MASS INDEX: 27.9 KG/M2

## 2019-01-15 DIAGNOSIS — Z34.02 ENCOUNTER FOR SUPERVISION OF NORMAL FIRST PREGNANCY IN SECOND TRIMESTER: Primary | ICD-10-CM

## 2019-01-15 PROCEDURE — 99207 ZZC PRENATAL VISIT: CPT | Performed by: ADVANCED PRACTICE MIDWIFE

## 2019-01-15 ASSESSMENT — MIFFLIN-ST. JEOR: SCORE: 1423.24

## 2019-01-15 NOTE — NURSING NOTE
"Chief Complaint   Patient presents with     Prenatal Care     14w5d       Initial /74   Ht 1.613 m (5' 3.5\")   Wt 74.1 kg (163 lb 6.4 oz)   LMP 10/04/2018   BMI 28.49 kg/m   Estimated body mass index is 28.49 kg/m  as calculated from the following:    Height as of this encounter: 1.613 m (5' 3.5\").    Weight as of this encounter: 74.1 kg (163 lb 6.4 oz).  BP completed using cuff size: regular    Questioned patient about current smoking habits.  Pt. has never smoked.          The following HM Due: NONE      The following patient reported/Care Every where data was sent to:  P ABSTRACT QUALITY INITIATIVES [[id number]]    Shelia Portillo MA        "

## 2019-01-15 NOTE — PROGRESS NOTES
"S: Having more energy, feeling less nausea. Reports having head cold, wondering what she can take.   Fetal movement No  Denies loss of fluid/vb/contractions/pelvic pain    O: /74   Ht 1.613 m (5' 3.5\")   Wt 74.1 kg (163 lb 6.4 oz)   LMP 10/04/2018   BMI 28.49 kg/m      LMP 10/04/2018   Exam:  Constitutional: healthy, alert and no distress  Respiratory: Respirations even and unlabored  Gastrointestinal: Abdomen soft, non-tender. Fundus measures appropriately for gestational age. Fetal heart tones heard easily.  Psychiatric: mentation appears normal and affect normal/bright  A: (Z34.02) Encounter for supervision of normal first pregnancy in second trimester  (primary encounter diagnosis)  Comment: ordered   Plan: US OB > 14 weeks, COMPLETE, Single (Fetal         Survey) ([id number])        return to clinic for US and prenatal appointment  4-6 weeks        P: Anatomy ultrasound next visit between 20-22 weeks  Declines genetic screening   Discussed vapo rub, humidifier, sudafed for congestion   Return to clinic 4 weeks    Lily Abbott, DAVID, APRN, CNM          "

## 2019-02-07 ENCOUNTER — MYC MEDICAL ADVICE (OUTPATIENT)
Dept: OBGYN | Facility: CLINIC | Age: 34
End: 2019-02-07

## 2019-02-07 DIAGNOSIS — R39.9 UTI SYMPTOMS: Primary | ICD-10-CM

## 2019-02-07 NOTE — TELEPHONE ENCOUNTER
Please see the tagga message and advise. Do you want pt to come in for UA, or send antibiotics?      Torri Hastings RN

## 2019-02-08 ENCOUNTER — RECORDS - HEALTHEAST (OUTPATIENT)
Dept: ADMINISTRATIVE | Facility: OTHER | Age: 34
End: 2019-02-08

## 2019-02-08 DIAGNOSIS — N30.00 ACUTE CYSTITIS WITHOUT HEMATURIA: Primary | ICD-10-CM

## 2019-02-08 DIAGNOSIS — R39.9 UTI SYMPTOMS: ICD-10-CM

## 2019-02-08 LAB
ALBUMIN UR-MCNC: NEGATIVE MG/DL
APPEARANCE UR: CLEAR
BACTERIA #/AREA URNS HPF: ABNORMAL /HPF
BILIRUB UR QL STRIP: NEGATIVE
COLOR UR AUTO: YELLOW
GLUCOSE UR STRIP-MCNC: NEGATIVE MG/DL
HGB UR QL STRIP: NEGATIVE
KETONES UR STRIP-MCNC: NEGATIVE MG/DL
LEUKOCYTE ESTERASE UR QL STRIP: ABNORMAL
NITRATE UR QL: NEGATIVE
NON-SQ EPI CELLS #/AREA URNS LPF: ABNORMAL /LPF
PH UR STRIP: 7 PH (ref 5–7)
RBC #/AREA URNS AUTO: ABNORMAL /HPF
SOURCE: ABNORMAL
SP GR UR STRIP: 1.02 (ref 1–1.03)
UROBILINOGEN UR STRIP-ACNC: 0.2 EU/DL (ref 0.2–1)
WBC #/AREA URNS AUTO: ABNORMAL /HPF

## 2019-02-08 PROCEDURE — 81001 URINALYSIS AUTO W/SCOPE: CPT | Performed by: ADVANCED PRACTICE MIDWIFE

## 2019-02-08 RX ORDER — NITROFURANTOIN 25; 75 MG/1; MG/1
100 CAPSULE ORAL 2 TIMES DAILY
Qty: 14 CAPSULE | Refills: 0 | Status: SHIPPED | OUTPATIENT
Start: 2019-02-08 | End: 2019-02-15

## 2019-02-19 ENCOUNTER — PRENATAL OFFICE VISIT (OUTPATIENT)
Dept: OBGYN | Facility: CLINIC | Age: 34
End: 2019-02-19
Payer: COMMERCIAL

## 2019-02-19 ENCOUNTER — RECORDS - HEALTHEAST (OUTPATIENT)
Dept: ADMINISTRATIVE | Facility: OTHER | Age: 34
End: 2019-02-19

## 2019-02-19 VITALS
HEIGHT: 64 IN | WEIGHT: 171.1 LBS | BODY MASS INDEX: 29.21 KG/M2 | DIASTOLIC BLOOD PRESSURE: 72 MMHG | SYSTOLIC BLOOD PRESSURE: 132 MMHG

## 2019-02-19 DIAGNOSIS — Z34.02 ENCOUNTER FOR SUPERVISION OF NORMAL FIRST PREGNANCY IN SECOND TRIMESTER: Primary | ICD-10-CM

## 2019-02-19 PROCEDURE — 99207 ZZC PRENATAL VISIT: CPT | Performed by: ADVANCED PRACTICE MIDWIFE

## 2019-02-19 ASSESSMENT — MIFFLIN-ST. JEOR: SCORE: 1458.16

## 2019-02-19 NOTE — NURSING NOTE
"Chief Complaint   Patient presents with     Prenatal Care     19w5d       Initial /72   Ht 1.613 m (5' 3.5\")   Wt 77.6 kg (171 lb 1.6 oz)   LMP 10/04/2018   BMI 29.83 kg/m   Estimated body mass index is 29.83 kg/m  as calculated from the following:    Height as of this encounter: 1.613 m (5' 3.5\").    Weight as of this encounter: 77.6 kg (171 lb 1.6 oz).  BP completed using cuff size: regular    Questioned patient about current smoking habits.  Pt. has never smoked.          The following HM Due: NONE      The following patient reported/Care Every where data was sent to:  P ABSTRACT QUALITY INITIATIVES [[id number]]      Shelia Portillo MA           "

## 2019-02-19 NOTE — PROGRESS NOTES
"S: Feels well,  Unsure if feeling fetal movement.  Denies uterine cramping, vaginal bleeding or leaking of fluid.  O: Vitals: /72   Ht 1.613 m (5' 3.5\")   Wt 77.6 kg (171 lb 1.6 oz)   LMP 10/04/2018   BMI 29.83 kg/m    BMI= Body mass index is 29.83 kg/m .  Exam:  Constitutional: healthy, alert and no distress  Respiratory: respirations even and unlabored  Gastrointestinal: Abdomen soft, non-tender. Fundus measures appropriate for gestational age. Fetal heart tones heard without difficulty and within normal limits  : Deferred  Psychiatric: mentation appears normal and affect normal/bright  A: (Z34.02) Encounter for supervision of normal first pregnancy in second trimester  (primary encounter diagnosis)  Plan: return to clinic 4 weeks     P: Discussed 20 week fetal screen. Has scheduled for tomorrow.   Encouraged patient to call with any questions or concerns.      Lily Abbott, DAVID, APRN, CNM        "

## 2019-02-20 ENCOUNTER — ANCILLARY PROCEDURE (OUTPATIENT)
Dept: ULTRASOUND IMAGING | Facility: CLINIC | Age: 34
End: 2019-02-20
Payer: COMMERCIAL

## 2019-02-20 ENCOUNTER — RECORDS - HEALTHEAST (OUTPATIENT)
Dept: ADMINISTRATIVE | Facility: OTHER | Age: 34
End: 2019-02-20

## 2019-02-20 DIAGNOSIS — Z34.02 PREGNANCY, SUPERVISION OF FIRST, SECOND TRIMESTER: Primary | ICD-10-CM

## 2019-02-20 DIAGNOSIS — Z34.02 ENCOUNTER FOR SUPERVISION OF NORMAL FIRST PREGNANCY IN SECOND TRIMESTER: ICD-10-CM

## 2019-02-20 PROCEDURE — 76805 OB US >/= 14 WKS SNGL FETUS: CPT | Performed by: FAMILY MEDICINE

## 2019-02-20 NOTE — PATIENT INSTRUCTIONS
Weeks 17 thru 20 - Gestational Age (Fetal Age - Weeks 15 thru 18):  The baby has reached a point where movements are being felt more often by the mother. The eyebrows and eyelashes grow in, and tiny nails have begun to grow on the fingers and toes. The skin of the fetus is going through many changes and begins to produce vernix at the twentieth week. Vernix is a white pasty substance that covers the fetus  skin to protect it from amniotic fluid. Your baby can now hear your voices and music.  A fetal heartbeat can be heard by a stethoscope now. The fetus has reached a length of 8 inches and weighs about 12 ounces.

## 2019-02-21 ENCOUNTER — AMBULATORY - HEALTHEAST (OUTPATIENT)
Dept: MATERNAL FETAL MEDICINE | Facility: HOSPITAL | Age: 34
End: 2019-02-21

## 2019-02-21 DIAGNOSIS — O26.90 PREGNANCY, ANTEPARTUM, COMPLICATIONS: ICD-10-CM

## 2019-02-25 ENCOUNTER — AMBULATORY - HEALTHEAST (OUTPATIENT)
Dept: MATERNAL FETAL MEDICINE | Facility: HOSPITAL | Age: 34
End: 2019-02-25

## 2019-02-27 ENCOUNTER — RECORDS - HEALTHEAST (OUTPATIENT)
Dept: ULTRASOUND IMAGING | Facility: HOSPITAL | Age: 34
End: 2019-02-27

## 2019-02-27 ENCOUNTER — OFFICE VISIT - HEALTHEAST (OUTPATIENT)
Dept: MATERNAL FETAL MEDICINE | Facility: HOSPITAL | Age: 34
End: 2019-02-27

## 2019-02-27 ENCOUNTER — RECORDS - HEALTHEAST (OUTPATIENT)
Dept: ADMINISTRATIVE | Facility: OTHER | Age: 34
End: 2019-02-27

## 2019-02-27 DIAGNOSIS — Z03.73 SUSPECTED FETAL ANOMALY NOT FOUND: ICD-10-CM

## 2019-02-27 DIAGNOSIS — O26.90 PREGNANCY RELATED CONDITIONS, UNSPECIFIED, UNSPECIFIED TRIMESTER: ICD-10-CM

## 2019-03-11 ENCOUNTER — COMMUNICATION - HEALTHEAST (OUTPATIENT)
Dept: ADMINISTRATIVE | Facility: CLINIC | Age: 34
End: 2019-03-11

## 2019-03-26 ENCOUNTER — PRENATAL OFFICE VISIT - HEALTHEAST (OUTPATIENT)
Dept: MIDWIFE SERVICES | Facility: CLINIC | Age: 34
End: 2019-03-26

## 2019-03-26 DIAGNOSIS — F41.9 ANXIETY: ICD-10-CM

## 2019-03-26 DIAGNOSIS — Z34.00 SUPERVISION OF NORMAL FIRST PREGNANCY, ANTEPARTUM: ICD-10-CM

## 2019-03-26 ASSESSMENT — MIFFLIN-ST. JEOR: SCORE: 1448.85

## 2019-04-04 ENCOUNTER — RECORDS - HEALTHEAST (OUTPATIENT)
Dept: HEALTH INFORMATION MANAGEMENT | Facility: CLINIC | Age: 34
End: 2019-04-04

## 2019-04-23 ENCOUNTER — PRENATAL OFFICE VISIT - HEALTHEAST (OUTPATIENT)
Dept: MIDWIFE SERVICES | Facility: CLINIC | Age: 34
End: 2019-04-23

## 2019-04-23 ENCOUNTER — TRANSFERRED RECORDS (OUTPATIENT)
Dept: HEALTH INFORMATION MANAGEMENT | Facility: CLINIC | Age: 34
End: 2019-04-23

## 2019-04-23 DIAGNOSIS — Z34.02 ENCOUNTER FOR SUPERVISION OF NORMAL FIRST PREGNANCY IN SECOND TRIMESTER: ICD-10-CM

## 2019-04-23 DIAGNOSIS — Z34.00 SUPERVISION OF NORMAL FIRST PREGNANCY, ANTEPARTUM: ICD-10-CM

## 2019-04-23 LAB
FASTING STATUS PATIENT QL REPORTED: NO
GLUCOSE 1H P 50 G GLC PO SERPL-MCNC: 120 MG/DL (ref 70–139)
HGB BLD-MCNC: 12 G/DL (ref 12–16)

## 2019-04-23 ASSESSMENT — MIFFLIN-ST. JEOR: SCORE: 1457.92

## 2019-04-24 LAB — T PALLIDUM AB SER QL: NEGATIVE

## 2019-04-30 ENCOUNTER — COMMUNICATION - HEALTHEAST (OUTPATIENT)
Dept: MIDWIFE SERVICES | Facility: CLINIC | Age: 34
End: 2019-04-30

## 2019-04-30 ENCOUNTER — COMMUNICATION - HEALTHEAST (OUTPATIENT)
Dept: OBGYN | Facility: CLINIC | Age: 34
End: 2019-04-30

## 2019-04-30 DIAGNOSIS — Z34.00 SUPERVISION OF NORMAL FIRST PREGNANCY, ANTEPARTUM: ICD-10-CM

## 2019-04-30 DIAGNOSIS — F41.9 ANXIETY: ICD-10-CM

## 2019-05-06 ENCOUNTER — TELEPHONE (OUTPATIENT)
Dept: OBGYN | Facility: CLINIC | Age: 34
End: 2019-05-06

## 2019-05-06 NOTE — TELEPHONE ENCOUNTER
----- Message from Terri Andrade CNM sent at 5/6/2019  2:22 PM CDT -----  This patient has not been seen for prenatal visit since 02/19/19. Can you call and see if she has transferred? If not she needs to be seen soon.   Thanks!  Terri Andrade CNM, [facility]

## 2019-05-06 NOTE — TELEPHONE ENCOUNTER
Pt trasnferred care so she can deliver at [facility].  Poly JAIMES R.N.  [facility]

## 2019-05-14 ENCOUNTER — PRENATAL OFFICE VISIT - HEALTHEAST (OUTPATIENT)
Dept: MIDWIFE SERVICES | Facility: CLINIC | Age: 34
End: 2019-05-14

## 2019-05-14 DIAGNOSIS — Z34.02 ENCOUNTER FOR SUPERVISION OF NORMAL FIRST PREGNANCY IN SECOND TRIMESTER: ICD-10-CM

## 2019-05-14 ASSESSMENT — MIFFLIN-ST. JEOR: SCORE: 1471.53

## 2019-05-29 ENCOUNTER — PRENATAL OFFICE VISIT - HEALTHEAST (OUTPATIENT)
Dept: MIDWIFE SERVICES | Facility: CLINIC | Age: 34
End: 2019-05-29

## 2019-05-29 DIAGNOSIS — J30.2 SEASONAL ALLERGIES: ICD-10-CM

## 2019-05-29 DIAGNOSIS — Z34.00 SUPERVISION OF NORMAL FIRST PREGNANCY, ANTEPARTUM: ICD-10-CM

## 2019-05-29 DIAGNOSIS — L30.9 ECZEMA, UNSPECIFIED TYPE: ICD-10-CM

## 2019-05-29 DIAGNOSIS — K21.9 GASTROESOPHAGEAL REFLUX DISEASE WITHOUT ESOPHAGITIS: ICD-10-CM

## 2019-05-29 ASSESSMENT — MIFFLIN-ST. JEOR: SCORE: 1476.07

## 2019-06-11 ENCOUNTER — PRENATAL OFFICE VISIT - HEALTHEAST (OUTPATIENT)
Dept: MIDWIFE SERVICES | Facility: CLINIC | Age: 34
End: 2019-06-11

## 2019-06-11 ENCOUNTER — TRANSFERRED RECORDS (OUTPATIENT)
Dept: HEALTH INFORMATION MANAGEMENT | Facility: CLINIC | Age: 34
End: 2019-06-11

## 2019-06-11 DIAGNOSIS — Z34.00 SUPERVISION OF NORMAL FIRST PREGNANCY, ANTEPARTUM: ICD-10-CM

## 2019-06-11 ASSESSMENT — MIFFLIN-ST. JEOR: SCORE: 1498.75

## 2019-06-18 ENCOUNTER — PRENATAL OFFICE VISIT - HEALTHEAST (OUTPATIENT)
Dept: MIDWIFE SERVICES | Facility: CLINIC | Age: 34
End: 2019-06-18

## 2019-06-18 DIAGNOSIS — O16.3 ELEVATED BLOOD PRESSURE AFFECTING PREGNANCY IN THIRD TRIMESTER, ANTEPARTUM: ICD-10-CM

## 2019-06-18 DIAGNOSIS — Z34.03 ENCOUNTER FOR SUPERVISION OF NORMAL FIRST PREGNANCY IN THIRD TRIMESTER: ICD-10-CM

## 2019-06-18 LAB
APTT PPP: 27 SECONDS (ref 24–37)
AST SERPL W P-5'-P-CCNC: 15 U/L (ref 0–40)
CREAT SERPL-MCNC: 0.79 MG/DL (ref 0.6–1.1)
CREAT UR-MCNC: 63.7 MG/DL
ERYTHROCYTE [DISTWIDTH] IN BLOOD BY AUTOMATED COUNT: 12.2 % (ref 11–14.5)
GFR SERPL CREATININE-BSD FRML MDRD: >60 ML/MIN/1.73M2
HCT VFR BLD AUTO: 40 % (ref 35–47)
HGB BLD-MCNC: 13.5 G/DL (ref 12–16)
INR PPP: 0.89 (ref 0.9–1.1)
MCH RBC QN AUTO: 29.9 PG (ref 27–34)
MCHC RBC AUTO-ENTMCNC: 33.7 G/DL (ref 32–36)
MCV RBC AUTO: 89 FL (ref 80–100)
PLATELET # BLD AUTO: 225 THOU/UL (ref 140–440)
PMV BLD AUTO: 9.1 FL (ref 7–10)
PROTEIN, RANDOM URINE - HISTORICAL: <7 MG/DL
PROTEIN/CREAT RATIO, RANDOM UR: NORMAL
RBC # BLD AUTO: 4.51 MILL/UL (ref 3.8–5.4)
URATE SERPL-MCNC: 6.5 MG/DL (ref 2–7.5)
WBC: 13.9 THOU/UL (ref 4–11)

## 2019-06-18 ASSESSMENT — MIFFLIN-ST. JEOR: SCORE: 1503.28

## 2019-06-19 ENCOUNTER — COMMUNICATION - HEALTHEAST (OUTPATIENT)
Dept: OBGYN | Facility: CLINIC | Age: 34
End: 2019-06-19

## 2019-06-19 DIAGNOSIS — F41.9 ANXIETY: ICD-10-CM

## 2019-06-19 DIAGNOSIS — O16.3 ELEVATED BLOOD PRESSURE AFFECTING PREGNANCY IN THIRD TRIMESTER, ANTEPARTUM: ICD-10-CM

## 2019-06-19 DIAGNOSIS — Z34.00 SUPERVISION OF NORMAL FIRST PREGNANCY, ANTEPARTUM: ICD-10-CM

## 2019-06-19 LAB
ALLERGIC TO PENICILLIN: NO
GP B STREP DNA SPEC QL NAA+PROBE: NEGATIVE

## 2019-06-21 ENCOUNTER — PRENATAL OFFICE VISIT - HEALTHEAST (OUTPATIENT)
Dept: MIDWIFE SERVICES | Facility: CLINIC | Age: 34
End: 2019-06-21

## 2019-06-21 DIAGNOSIS — Z34.03 ENCOUNTER FOR SUPERVISION OF NORMAL FIRST PREGNANCY IN THIRD TRIMESTER: ICD-10-CM

## 2019-06-21 ASSESSMENT — MIFFLIN-ST. JEOR: SCORE: 1503.28

## 2019-06-26 ENCOUNTER — PRENATAL OFFICE VISIT - HEALTHEAST (OUTPATIENT)
Dept: MIDWIFE SERVICES | Facility: CLINIC | Age: 34
End: 2019-06-26

## 2019-06-26 DIAGNOSIS — Z34.03 ENCOUNTER FOR SUPERVISION OF NORMAL FIRST PREGNANCY IN THIRD TRIMESTER: ICD-10-CM

## 2019-06-26 DIAGNOSIS — O13.3 GESTATIONAL HYPERTENSION, THIRD TRIMESTER: ICD-10-CM

## 2019-06-26 ASSESSMENT — MIFFLIN-ST. JEOR: SCORE: 1507.82

## 2019-06-27 ENCOUNTER — TELEPHONE (OUTPATIENT)
Dept: PEDIATRICS | Facility: CLINIC | Age: 34
End: 2019-06-27

## 2019-06-27 ENCOUNTER — ANESTHESIA - HEALTHEAST (OUTPATIENT)
Dept: OBGYN | Facility: CLINIC | Age: 34
End: 2019-06-27

## 2019-06-27 NOTE — TELEPHONE ENCOUNTER
Notifying recent  Hospital visit: [facility] 6/27/19    Please review through care everywhere.     //Tiana Morales MA// June 27, 2019 10:52 AM        d

## 2019-06-29 ENCOUNTER — HOME CARE/HOSPICE - HEALTHEAST (OUTPATIENT)
Dept: HOME HEALTH SERVICES | Facility: HOME HEALTH | Age: 34
End: 2019-06-29

## 2019-07-01 ENCOUNTER — HOME CARE/HOSPICE - HEALTHEAST (OUTPATIENT)
Dept: HOME HEALTH SERVICES | Facility: HOME HEALTH | Age: 34
End: 2019-07-01

## 2019-07-09 ENCOUNTER — OFFICE VISIT - HEALTHEAST (OUTPATIENT)
Dept: MIDWIFE SERVICES | Facility: CLINIC | Age: 34
End: 2019-07-09

## 2019-07-09 ASSESSMENT — MIFFLIN-ST. JEOR: SCORE: 1430.71

## 2019-07-10 ENCOUNTER — COMMUNICATION - HEALTHEAST (OUTPATIENT)
Dept: ADMINISTRATIVE | Facility: CLINIC | Age: 34
End: 2019-07-10

## 2019-07-11 ENCOUNTER — COMMUNICATION - HEALTHEAST (OUTPATIENT)
Dept: MIDWIFE SERVICES | Facility: CLINIC | Age: 34
End: 2019-07-11

## 2019-08-06 ENCOUNTER — TRANSFERRED RECORDS (OUTPATIENT)
Dept: HEALTH INFORMATION MANAGEMENT | Facility: CLINIC | Age: 34
End: 2019-08-06

## 2019-08-06 ENCOUNTER — OFFICE VISIT - HEALTHEAST (OUTPATIENT)
Dept: MIDWIFE SERVICES | Facility: CLINIC | Age: 34
End: 2019-08-06

## 2019-08-06 LAB — HGB BLD-MCNC: 13 G/DL (ref 12–16)

## 2019-08-06 ASSESSMENT — MIFFLIN-ST. JEOR: SCORE: 1457.92

## 2019-08-07 LAB
25(OH)D3 SERPL-MCNC: 36.3 NG/ML (ref 30–80)
25(OH)D3 SERPL-MCNC: 36.3 NG/ML (ref 30–80)

## 2019-09-25 ENCOUNTER — AMBULATORY - HEALTHEAST (OUTPATIENT)
Dept: MIDWIFE SERVICES | Facility: CLINIC | Age: 34
End: 2019-09-25

## 2019-09-25 ENCOUNTER — COMMUNICATION - HEALTHEAST (OUTPATIENT)
Dept: MIDWIFE SERVICES | Facility: CLINIC | Age: 34
End: 2019-09-25

## 2019-09-25 DIAGNOSIS — Z30.9 ENCOUNTER FOR CONTRACEPTIVE MANAGEMENT, UNSPECIFIED TYPE: ICD-10-CM

## 2020-03-01 ENCOUNTER — HEALTH MAINTENANCE LETTER (OUTPATIENT)
Age: 35
End: 2020-03-01

## 2020-08-26 ENCOUNTER — E-VISIT (OUTPATIENT)
Dept: PEDIATRICS | Facility: CLINIC | Age: 35
End: 2020-08-26
Payer: COMMERCIAL

## 2020-08-26 DIAGNOSIS — R30.0 DYSURIA: Primary | ICD-10-CM

## 2020-08-26 PROCEDURE — 99421 OL DIG E/M SVC 5-10 MIN: CPT | Performed by: NURSE PRACTITIONER

## 2020-08-26 NOTE — TELEPHONE ENCOUNTER
The pt is aware and scheduled for her upcoming appointment.   Marizol Gore on 8/26/2020 at 10:09 AM

## 2020-10-02 ENCOUNTER — COMMUNICATION - HEALTHEAST (OUTPATIENT)
Dept: SCHEDULING | Facility: CLINIC | Age: 35
End: 2020-10-02

## 2020-10-04 ENCOUNTER — COMMUNICATION - HEALTHEAST (OUTPATIENT)
Dept: MIDWIFE SERVICES | Facility: CLINIC | Age: 35
End: 2020-10-04

## 2020-11-04 ENCOUNTER — PRENATAL OFFICE VISIT - HEALTHEAST (OUTPATIENT)
Dept: MIDWIFE SERVICES | Facility: CLINIC | Age: 35
End: 2020-11-04

## 2020-11-04 DIAGNOSIS — O09.529 SUPERVISION OF HIGH-RISK PREGNANCY OF ELDERLY MULTIGRAVIDA: ICD-10-CM

## 2020-11-04 LAB
BASOPHILS # BLD AUTO: 0 THOU/UL (ref 0–0.2)
BASOPHILS NFR BLD AUTO: 0 % (ref 0–2)
EOSINOPHIL # BLD AUTO: 0 THOU/UL (ref 0–0.4)
EOSINOPHIL NFR BLD AUTO: 0 % (ref 0–6)
ERYTHROCYTE [DISTWIDTH] IN BLOOD BY AUTOMATED COUNT: 11.9 % (ref 11–14.5)
HCT VFR BLD AUTO: 38.1 % (ref 35–47)
HGB BLD-MCNC: 13.4 G/DL (ref 12–16)
HIV 1+2 AB+HIV1 P24 AG SERPL QL IA: NEGATIVE
IMM GRANULOCYTES # BLD: 0.1 THOU/UL
IMM GRANULOCYTES NFR BLD: 1 %
LYMPHOCYTES # BLD AUTO: 2.2 THOU/UL (ref 0.8–4.4)
LYMPHOCYTES NFR BLD AUTO: 15 % (ref 20–40)
MCH RBC QN AUTO: 28.3 PG (ref 27–34)
MCHC RBC AUTO-ENTMCNC: 35.2 G/DL (ref 32–36)
MCV RBC AUTO: 80 FL (ref 80–100)
MONOCYTES # BLD AUTO: 0.5 THOU/UL (ref 0–0.9)
MONOCYTES NFR BLD AUTO: 4 % (ref 2–10)
NEUTROPHILS # BLD AUTO: 11.6 THOU/UL (ref 2–7.7)
NEUTROPHILS NFR BLD AUTO: 80 % (ref 50–70)
PLATELET # BLD AUTO: 338 THOU/UL (ref 140–440)
PMV BLD AUTO: 10.5 FL (ref 8.5–12.5)
RBC # BLD AUTO: 4.74 MILL/UL (ref 3.8–5.4)
TSH SERPL DL<=0.005 MIU/L-ACNC: 1.52 UIU/ML (ref 0.3–5)
WBC: 14.5 THOU/UL (ref 4–11)

## 2020-11-04 ASSESSMENT — MIFFLIN-ST. JEOR: SCORE: 1425.71

## 2020-11-05 ENCOUNTER — AMBULATORY - HEALTHEAST (OUTPATIENT)
Dept: OBGYN | Facility: CLINIC | Age: 35
End: 2020-11-05

## 2020-11-05 ENCOUNTER — HOSPITAL ENCOUNTER (OUTPATIENT)
Dept: ULTRASOUND IMAGING | Facility: CLINIC | Age: 35
Discharge: HOME | End: 2020-11-05
Attending: ADVANCED PRACTICE MIDWIFE

## 2020-11-05 DIAGNOSIS — O09.529 SUPERVISION OF HIGH-RISK PREGNANCY OF ELDERLY MULTIGRAVIDA: ICD-10-CM

## 2020-11-05 LAB
ABO/RH(D): NORMAL
ABORH REPEAT: NORMAL
ANTIBODY SCREEN: NEGATIVE
BACTERIA SPEC CULT: NO GROWTH
HBA1C MFR BLD: 5 %
HBV SURFACE AG SERPL QL IA: NEGATIVE
RUBV IGG SERPL QL IA: POSITIVE
T PALLIDUM AB SER QL: NEGATIVE

## 2020-11-05 ASSESSMENT — EDINBURGH POSTNATAL DEPRESSION SCALE (EPDS): TOTAL SCORE: 0

## 2020-12-06 ENCOUNTER — COMMUNICATION - HEALTHEAST (OUTPATIENT)
Dept: MIDWIFE SERVICES | Facility: CLINIC | Age: 35
End: 2020-12-06

## 2020-12-14 ENCOUNTER — HEALTH MAINTENANCE LETTER (OUTPATIENT)
Age: 35
End: 2020-12-14

## 2020-12-15 ENCOUNTER — TRANSFERRED RECORDS (OUTPATIENT)
Dept: HEALTH INFORMATION MANAGEMENT | Facility: CLINIC | Age: 35
End: 2020-12-15

## 2020-12-15 ENCOUNTER — PRENATAL OFFICE VISIT - HEALTHEAST (OUTPATIENT)
Dept: MIDWIFE SERVICES | Facility: CLINIC | Age: 35
End: 2020-12-15

## 2020-12-15 DIAGNOSIS — O09.529 SUPERVISION OF HIGH-RISK PREGNANCY OF ELDERLY MULTIGRAVIDA: ICD-10-CM

## 2020-12-15 ASSESSMENT — MIFFLIN-ST. JEOR: SCORE: 1428.43

## 2021-01-05 ENCOUNTER — HOSPITAL ENCOUNTER (OUTPATIENT)
Dept: ULTRASOUND IMAGING | Facility: CLINIC | Age: 36
Discharge: HOME | End: 2021-01-05
Attending: MIDWIFE

## 2021-01-05 DIAGNOSIS — O09.529 SUPERVISION OF HIGH-RISK PREGNANCY OF ELDERLY MULTIGRAVIDA: ICD-10-CM

## 2021-01-06 ENCOUNTER — COMMUNICATION - HEALTHEAST (OUTPATIENT)
Dept: MIDWIFE SERVICES | Facility: CLINIC | Age: 36
End: 2021-01-06

## 2021-01-06 ENCOUNTER — AMBULATORY - HEALTHEAST (OUTPATIENT)
Dept: MATERNAL FETAL MEDICINE | Facility: HOSPITAL | Age: 36
End: 2021-01-06

## 2021-01-06 ENCOUNTER — TRANSFERRED RECORDS (OUTPATIENT)
Dept: HEALTH INFORMATION MANAGEMENT | Facility: CLINIC | Age: 36
End: 2021-01-06

## 2021-01-06 ENCOUNTER — AMBULATORY - HEALTHEAST (OUTPATIENT)
Dept: MIDWIFE SERVICES | Facility: CLINIC | Age: 36
End: 2021-01-06

## 2021-01-06 ENCOUNTER — MEDICAL CORRESPONDENCE (OUTPATIENT)
Dept: HEALTH INFORMATION MANAGEMENT | Facility: CLINIC | Age: 36
End: 2021-01-06

## 2021-01-06 ENCOUNTER — TRANSCRIBE ORDERS (OUTPATIENT)
Dept: MATERNAL FETAL MEDICINE | Facility: CLINIC | Age: 36
End: 2021-01-06

## 2021-01-06 DIAGNOSIS — O26.90 PREGNANCY, ANTEPARTUM, COMPLICATIONS: ICD-10-CM

## 2021-01-06 DIAGNOSIS — O26.90 PREGNANCY RELATED CONDITION, ANTEPARTUM: Primary | ICD-10-CM

## 2021-01-06 DIAGNOSIS — O09.529 SUPERVISION OF HIGH-RISK PREGNANCY OF ELDERLY MULTIGRAVIDA: ICD-10-CM

## 2021-01-06 DIAGNOSIS — O09.529 AMA (ADVANCED MATERNAL AGE) MULTIGRAVIDA 35+: ICD-10-CM

## 2021-01-07 ENCOUNTER — PRENATAL OFFICE VISIT - HEALTHEAST (OUTPATIENT)
Dept: MIDWIFE SERVICES | Facility: CLINIC | Age: 36
End: 2021-01-07

## 2021-01-07 DIAGNOSIS — O09.529 SUPERVISION OF HIGH-RISK PREGNANCY OF ELDERLY MULTIGRAVIDA: ICD-10-CM

## 2021-01-07 ASSESSMENT — MIFFLIN-ST. JEOR: SCORE: 1434.78

## 2021-01-20 ENCOUNTER — RECORDS - HEALTHEAST (OUTPATIENT)
Dept: ADMINISTRATIVE | Facility: OTHER | Age: 36
End: 2021-01-20

## 2021-01-20 ENCOUNTER — OFFICE VISIT (OUTPATIENT)
Dept: MATERNAL FETAL MEDICINE | Facility: CLINIC | Age: 36
End: 2021-01-20
Attending: MIDWIFE
Payer: COMMERCIAL

## 2021-01-20 ENCOUNTER — HOSPITAL ENCOUNTER (OUTPATIENT)
Dept: ULTRASOUND IMAGING | Facility: CLINIC | Age: 36
End: 2021-01-20
Attending: MIDWIFE
Payer: COMMERCIAL

## 2021-01-20 ENCOUNTER — PRE VISIT (OUTPATIENT)
Dept: MATERNAL FETAL MEDICINE | Facility: CLINIC | Age: 36
End: 2021-01-20

## 2021-01-20 DIAGNOSIS — O26.90 PREGNANCY RELATED CONDITION, ANTEPARTUM: Primary | ICD-10-CM

## 2021-01-20 DIAGNOSIS — O26.90 PREGNANCY RELATED CONDITION, ANTEPARTUM: ICD-10-CM

## 2021-01-20 PROCEDURE — 76811 OB US DETAILED SNGL FETUS: CPT | Mod: 26 | Performed by: OBSTETRICS & GYNECOLOGY

## 2021-01-20 PROCEDURE — 76811 OB US DETAILED SNGL FETUS: CPT

## 2021-01-21 ENCOUNTER — COMMUNICATION - HEALTHEAST (OUTPATIENT)
Dept: MIDWIFE SERVICES | Facility: CLINIC | Age: 36
End: 2021-01-21

## 2021-01-21 ENCOUNTER — AMBULATORY - HEALTHEAST (OUTPATIENT)
Dept: MIDWIFE SERVICES | Facility: CLINIC | Age: 36
End: 2021-01-21

## 2021-01-21 NOTE — PROGRESS NOTES
Please see ultrasound report under Imaging tab for details of today's ultrasound.    Jossie Awan MD  Maternal-Fetal Medicine

## 2021-02-04 ENCOUNTER — PRENATAL OFFICE VISIT - HEALTHEAST (OUTPATIENT)
Dept: MIDWIFE SERVICES | Facility: CLINIC | Age: 36
End: 2021-02-04

## 2021-02-04 ENCOUNTER — TRANSFERRED RECORDS (OUTPATIENT)
Dept: HEALTH INFORMATION MANAGEMENT | Facility: CLINIC | Age: 36
End: 2021-02-04

## 2021-02-04 DIAGNOSIS — O09.529 SUPERVISION OF HIGH-RISK PREGNANCY OF ELDERLY MULTIGRAVIDA: ICD-10-CM

## 2021-02-04 ASSESSMENT — MIFFLIN-ST. JEOR: SCORE: 1464.72

## 2021-03-04 ENCOUNTER — PRENATAL OFFICE VISIT - HEALTHEAST (OUTPATIENT)
Dept: MIDWIFE SERVICES | Facility: CLINIC | Age: 36
End: 2021-03-04

## 2021-03-04 DIAGNOSIS — O09.529 SUPERVISION OF HIGH-RISK PREGNANCY OF ELDERLY MULTIGRAVIDA: ICD-10-CM

## 2021-03-04 LAB
FASTING STATUS PATIENT QL REPORTED: NO
GLUCOSE 1H P 50 G GLC PO SERPL-MCNC: 115 MG/DL (ref 70–139)
HGB BLD-MCNC: 12.1 G/DL (ref 12–16)

## 2021-03-04 ASSESSMENT — EDINBURGH POSTNATAL DEPRESSION SCALE (EPDS): TOTAL SCORE: 0

## 2021-03-04 ASSESSMENT — MIFFLIN-ST. JEOR: SCORE: 1484.22

## 2021-03-05 LAB — T PALLIDUM AB SER QL: NEGATIVE

## 2021-04-01 ENCOUNTER — COMMUNICATION - HEALTHEAST (OUTPATIENT)
Dept: MIDWIFE SERVICES | Facility: CLINIC | Age: 36
End: 2021-04-01

## 2021-04-01 ENCOUNTER — PRENATAL OFFICE VISIT - HEALTHEAST (OUTPATIENT)
Dept: MIDWIFE SERVICES | Facility: CLINIC | Age: 36
End: 2021-04-01

## 2021-04-01 ENCOUNTER — TRANSFERRED RECORDS (OUTPATIENT)
Dept: HEALTH INFORMATION MANAGEMENT | Facility: CLINIC | Age: 36
End: 2021-04-01

## 2021-04-01 DIAGNOSIS — O09.529 SUPERVISION OF HIGH-RISK PREGNANCY OF ELDERLY MULTIGRAVIDA: ICD-10-CM

## 2021-04-01 ASSESSMENT — MIFFLIN-ST. JEOR: SCORE: 1493.75

## 2021-04-15 ENCOUNTER — PRENATAL OFFICE VISIT - HEALTHEAST (OUTPATIENT)
Dept: MIDWIFE SERVICES | Facility: CLINIC | Age: 36
End: 2021-04-15

## 2021-04-15 ENCOUNTER — TRANSFERRED RECORDS (OUTPATIENT)
Dept: HEALTH INFORMATION MANAGEMENT | Facility: CLINIC | Age: 36
End: 2021-04-15

## 2021-04-15 DIAGNOSIS — O09.529 SUPERVISION OF HIGH-RISK PREGNANCY OF ELDERLY MULTIGRAVIDA: ICD-10-CM

## 2021-04-15 ASSESSMENT — EDINBURGH POSTNATAL DEPRESSION SCALE (EPDS): TOTAL SCORE: 0

## 2021-04-15 ASSESSMENT — MIFFLIN-ST. JEOR: SCORE: 1506

## 2021-04-17 ENCOUNTER — HEALTH MAINTENANCE LETTER (OUTPATIENT)
Age: 36
End: 2021-04-17

## 2021-04-21 ENCOUNTER — COMMUNICATION - HEALTHEAST (OUTPATIENT)
Dept: MIDWIFE SERVICES | Facility: CLINIC | Age: 36
End: 2021-04-21

## 2021-04-29 ENCOUNTER — PRENATAL OFFICE VISIT - HEALTHEAST (OUTPATIENT)
Dept: MIDWIFE SERVICES | Facility: CLINIC | Age: 36
End: 2021-04-29

## 2021-04-29 ENCOUNTER — TRANSFERRED RECORDS (OUTPATIENT)
Dept: HEALTH INFORMATION MANAGEMENT | Facility: CLINIC | Age: 36
End: 2021-04-29

## 2021-04-29 DIAGNOSIS — O09.529 SUPERVISION OF HIGH-RISK PREGNANCY OF ELDERLY MULTIGRAVIDA: ICD-10-CM

## 2021-04-29 LAB — HGB BLD-MCNC: 12.7 G/DL (ref 12–16)

## 2021-04-29 ASSESSMENT — MIFFLIN-ST. JEOR: SCORE: 1499.19

## 2021-04-30 LAB
ALLERGIC TO PENICILLIN: NO
GP B STREP DNA SPEC QL NAA+PROBE: POSITIVE

## 2021-05-06 ENCOUNTER — TRANSFERRED RECORDS (OUTPATIENT)
Dept: HEALTH INFORMATION MANAGEMENT | Facility: CLINIC | Age: 36
End: 2021-05-06

## 2021-05-06 ENCOUNTER — PRENATAL OFFICE VISIT - HEALTHEAST (OUTPATIENT)
Dept: MIDWIFE SERVICES | Facility: CLINIC | Age: 36
End: 2021-05-06

## 2021-05-06 DIAGNOSIS — O09.529 SUPERVISION OF HIGH-RISK PREGNANCY OF ELDERLY MULTIGRAVIDA: ICD-10-CM

## 2021-05-06 DIAGNOSIS — B95.1 POSITIVE GBS TEST: ICD-10-CM

## 2021-05-06 ASSESSMENT — MIFFLIN-ST. JEOR: SCORE: 1507.36

## 2021-05-13 ENCOUNTER — PRENATAL OFFICE VISIT - HEALTHEAST (OUTPATIENT)
Dept: MIDWIFE SERVICES | Facility: CLINIC | Age: 36
End: 2021-05-13

## 2021-05-13 DIAGNOSIS — O09.529 SUPERVISION OF HIGH-RISK PREGNANCY OF ELDERLY MULTIGRAVIDA: ICD-10-CM

## 2021-05-13 ASSESSMENT — MIFFLIN-ST. JEOR: SCORE: 1510.99

## 2021-05-19 ENCOUNTER — TRANSFERRED RECORDS (OUTPATIENT)
Dept: HEALTH INFORMATION MANAGEMENT | Facility: CLINIC | Age: 36
End: 2021-05-19

## 2021-05-19 ENCOUNTER — PRENATAL OFFICE VISIT - HEALTHEAST (OUTPATIENT)
Dept: MIDWIFE SERVICES | Facility: CLINIC | Age: 36
End: 2021-05-19

## 2021-05-19 DIAGNOSIS — O09.529 SUPERVISION OF HIGH-RISK PREGNANCY OF ELDERLY MULTIGRAVIDA: ICD-10-CM

## 2021-05-19 ASSESSMENT — MIFFLIN-ST. JEOR: SCORE: 1515.07

## 2021-05-25 ENCOUNTER — TRANSFERRED RECORDS (OUTPATIENT)
Dept: HEALTH INFORMATION MANAGEMENT | Facility: CLINIC | Age: 36
End: 2021-05-25

## 2021-05-25 ENCOUNTER — PRENATAL OFFICE VISIT - HEALTHEAST (OUTPATIENT)
Dept: MIDWIFE SERVICES | Facility: CLINIC | Age: 36
End: 2021-05-25

## 2021-05-25 DIAGNOSIS — Z34.90 ENCOUNTER FOR INDUCTION OF LABOR: ICD-10-CM

## 2021-05-25 DIAGNOSIS — Z87.59 HISTORY OF GESTATIONAL HYPERTENSION: ICD-10-CM

## 2021-05-25 DIAGNOSIS — O09.529 SUPERVISION OF HIGH-RISK PREGNANCY OF ELDERLY MULTIGRAVIDA: ICD-10-CM

## 2021-05-25 DIAGNOSIS — B95.1 POSITIVE GBS TEST: ICD-10-CM

## 2021-05-25 ASSESSMENT — MIFFLIN-ST. JEOR: SCORE: 1516.43

## 2021-05-27 ENCOUNTER — COMMUNICATION - HEALTHEAST (OUTPATIENT)
Dept: OBGYN | Facility: CLINIC | Age: 36
End: 2021-05-27

## 2021-05-28 NOTE — PROGRESS NOTES
Arlyn presents to [facility] by herself for her routine PNV visit 28w 5d. Pt is feeling well in glucose. 1 hr glucose testing today, as well as hemoglobin, rpr.  [facility] and PTL discussed and written info given. Answered all questions from last [facility]IO visit. TDap today--discussed and pt accepts, given.  Education done. CNM details updated.  Discussed childbirth education classes.  Discussed type of plan for birth/pain control and patient is unsure but will consider epidural and nitrous.  May change her mind after childbirth education classes.  Patient is pretty sure that she wants to try breast-feeding, encouraged breast-feeding class.

## 2021-05-28 NOTE — TELEPHONE ENCOUNTER
TELEPHONE CALL:  Patient had sent Villij[facility] message asking if cloudy urine was concerning. Called back to clarify no unusual urgency/frequency/no painful urination, denies all.   Reassured could be cloudy due to increased vaginal discharge.  Encouraged increased hydration.  Advised we can always have her urine dipped at her next clinic visit to check for signs of asymptomatic bacteriuria.  Encouraged to be evaluated sooner if s/s of UTI develop, verbalized understanding and agreement with plan.  Plans RTC at 32w gestation, sooner as needed.  IVA Azul, NAT  4/30/2019  4:41 PM

## 2021-05-28 NOTE — PROGRESS NOTES
Arlyn presents to [facility] clinic by herself for a routine prenatal visit 31w5d. Cloudy urine but not symptomatic. Discussed how urine can be cloudy d/t discharge. Pt declines  UA/UC. Pt is feeling well. MC and PTL reviewed. Education done. Peds provider--still looking. Baby's active.

## 2021-05-29 NOTE — PATIENT INSTRUCTIONS - HE
[facility] Nurse Midwives - Contact information:  Appointment line and to get a hold of CNM in clinic Monday-Friday 8 am - 5 pm:  [phone].  There are some clinics with early start times (1st appointment 7:40 am) and others with evening hours (last appointment 6:20 pm).  Most are typically open from 8 am to 5 pm.    CNM on call answering service: [phone].  Specify your hospital of choice and leave a brief message for CNM;  will then page CNM who is on call at your specified hospital and you should receive a call back with 15 minutes.  Be sure that your ringer is audible and that you can accept blocked calls so that we can get back in touch with you! This number should be reserved for urgent needs if during the day, before 8 am, after 5 pm, weekends, holidays.    Contact the on-call CNM with warning signs, such as:    vaginal bleeding     Vaginal discharge and itching or pain and burning during urination    Leg/calf pain or swelling on one side    severe abdominal pain    nausea and vomiting more than 4-5 times a day, or if you are unable to keep anything down    fever more than 100.4 degrees F.         You are invited to  Meet the [facility] Nurse-Midwives  A way to tour the hospital Labor and Delivery unit and meet the midwives that attend births since you may not have the opportunity to meet them during your prenatal care.  Some sessions are informal meet and greet type social hours, others address a specific concern or topic.    Thursday, Februrary 22, 2018 7-8pm  [facility]  Social Hour    Thursday, April 19, 2018 7-8pm  [facility]  Exercise, nutrition and weight gain    Tuesday, June 28, 2018 7-8pm  [facility]  Postpartum depression/anxiety    Thursday August 23, 2018 7-8 pm  [facility]  Physiology of birth and breastfeeding, Pediatrician presentation    Thursday October 18,  2018  7-8pm,  [facility]  Social Hour    Please call [phone] to register        Touring the Maternity Care Center  To schedule a tour at either [place] or [facility], please do so online using the following links:  [facility] - [email or web address]  [facility] - [email or web address]    Childbirth and Parenting Education:   [facility]: http://[facility] HospitalKermdinger Studios/   [phone]  [place]: (education, yoga & wellness) www.[employer]  Enlightened Mama: www.[facility].Diet4Life   Childbirth collective: (Parent topic nights)  www.childbirthcollective.org/  Hypnobabies:  www.hypnobabiestwincities.com/  Hypnobirthing:  Http://hypnobirthing.com/    Book Recommendations:   Dilia Ju's Birthing From Within--first few chapters include a new-age tone, you may prefer to skip it and keep going, because there is good stuff later.  This book recommendation covers emotional preparation, but does cover coping with pain, and use of both pharmacological and nonpharmacological methods.    Dr. Dowd' The Pregnancy Book and The Birth Book--the pregnancy book goes month-by month    Womanly Art of Breastfeeding by La Leche [employer]   Bestfeeding by Marivel Steele--great pictures    Mothering Your Nursing Toddler, by Jackie Chun.   Addresses dealing with so many of the challenging behaviors of a nursing toddler.  How Weaning Happens, by La Leche League.  Discusses weaning at all ages, from medically necessary weaning of an infant, all the way up to age 5 (or older), with why/why not, and strategies.  Very empowering book both for deciding to wean and deciding not to.    American College of Nurse-Midwives (ACNM) http://www.midwife.org/; look at the informational handouts at  "http://www.midwife.org/Share-With-Women     www.mymidwife.org    Mother to Baby (Medication and Herbal guidance in pregnancy): http://www.mothertobaby.org  Toll-Free Hotline: 516.741.2421  LactMed (Medication use while breastfeeding): http://toxnet.nlm.nih.gov/newtoxnet/lactmed.htm    Women's Health.gov:  http://www.womenshealth.gov/a-z-topics/index.html    American pregnancy association - http://americanpregnancy.org    Centering Pregnancy (group prenatal care option): http://centeringhealthcare.org    Information about doulas:  Childbirth collective: http://www.childbirthcollective.org/  Doulas of [place] (SHELLIE):  www.shellie.org  MashMe.TV [facility]  project: http://Doyenzcitiesdoulaproject.com/     Early Childhood and Family Education (ECFE):  ECFE offers parents hands-on learning experiences that will nourish a lifetime of teachable moments.  http://ecfe.info/ecfe-home/     Dim www.marchofGliAffidabili.itmes.com     FDA - Nutrition  www.mypyramid.gov  Under \"For Consumers,\" click on \"pregnant and breastfeeding women.\"      Centers for Disease Control and Prevention (CDC) - Vaccines : http://www.cdc.gov/vaccines/       When researching information on the web, question the validity of websites.  The [employer] .gov, .edu and.org tend to be more reliable information.  If there are a lot of advertisements, be cautious of the information provided. Stay away from blogs and chat rooms please!     [place] Screening Program  Http://www.health.[facility].mn.us/newbornscreening/  [place] newborns are tested soon after birth for more than 50 hidden, rare disorders, including hearing loss and critical congenital heart disease (CCHD). This site provides resources and information for families and providers.    HEALTHY PREGNANCY CARE: 37 to 41 WEEKS PREGNANT    Talk with your midwife or physician about when to call with signs of labor    Regular uterine contractions that are getting closer together and/or stronger    If you think your " water has broken or is leaking    Bleeding from the vagina like a period (bloody vaginal discharge is normal)    If you are not feeling your baby move    Make plans for transportation and  as needed for when you are going to the hospital.    Your midwife or physician may offer to check your cervix for changes.     Ask your health care provider about vaccinations you may need following delivery. By now, you should have received a Tdap immunization to protect against pertussis or whooping cough. Fathers and family members who will be in close contact with the baby should also receive a Tdap shot at least two weeks before the expected birth of the baby if they have not had a Td (tetanus) shot for at least two years.    If you are past your due date, discuss the next steps leading to delivery with your midwife or physician. If you don't start labor on your own by 41 or 42 weeks, your midwife or physician may recommend giving you medicines to ripen your cervix and start labor.  Induction of labor: http://onlinelibrary.banks.com/store/10.1016/j.jmwh.2008.04.018/asset/j.jmwh.2008.04.018.pdf?v=1&t=ebwh1scg&y=[email or web address]    Preparing for your baby: Tell your midwife or physician how you plan to feed your baby (breast or bottle), who you have chosen to do pediatric care for your baby, and if you have a boy, whether you have chosen to have him circumcised. You will need a car seat correctly installed in your vehicle to bring your baby home. As you start to set up the nursery at home for your baby, make sure the crib is safe. The mattress needs to fit snugly against the edges of the crib. If you can fit a soda can between the bars, they are too far apart and can allow the baby's head to caught between them.    Learn about infant care and feeding, including information about infant CPR. We recommend that you put your baby to sleep on his or her back to reduce the chance of Sudden Infant  Death Syndrome (SIDS). To maintain a healthy environment in which your child can grow, it's best to keep your home smoke-free. By preparing ahead, your transition into parenthood will go smoothly for you and your baby.    Your midwife or physician will want to see you for a checkup 2 to 6 weeks after delivery.    If you have questions about any symptoms you are experiencing or any other concerns, call your provider or their clinic staff at [facility] at Phone: [phone]. If it's after clinic hours, physician patients should call the [facility] at [phone] ([phone]); midwife patients should call their answering service at [phone].    How can you care for yourself at home?   You can refer to the Starting Out Right book or find it online at http://www.healthSpecific Media.org/images/stories/maternity/[facility]-Starting-Out-Right.pdf or http://www.healthSpecific Media.org/images/stories/flipbooks/[facility]-starting-out-right/[facility]-starting-out-right.html#p=8     You can sign up for a weekly parenting e-mail that gives support, tips and advice from health care professionals that starts with pregnancy and continues through the toddler years. To register, go to www.[facility].org/baby at any time during your pregnancy.        Making Plans for Feeding My Baby    By this point, you probably have read a lot about feeding your baby.  Breastfeed or formula? Each mother s decision is her own and [facility] respects you and your choices. We ve gathered information on both breastfeeding and formula feeding to help with your decision. Talking with your physician or nurse-midwife can also help in your decision.  However you plan to feed your baby, [facility] encourage rooming in with your baby, skin-to-skin contact and feeding your baby based on his or her cues.    Skin-to-skin contact  Being close to mom helps your baby adjust to life outside of the womb.  It helps your baby regulate  their body temperature, heart rate, and breathing.  Your baby will usually be placed skin-to-skin immediately following birth or as soon as possible, if medical intervention is needed.    Rooming-In  Having your baby stay with you in your room is called  rooming-in .  Keeping your baby in your room helps you to learn how to care for your baby by getting to know your baby s cues, body rhythms and sleep cycle.       Cue-based feeding  Cues (signals) are baby s way of telling you what he or she wants.  When you learn your infant s cues, you know how to care for and feed your baby.   Feeding cues are the licking and smacking of lips, bringing their fist to their mouth, and a reflex called  rooting - where baby turns and opens his or her mouth, searching for the breast or bottle.  Crying is a late feeding cue.  Babies can feed frequently, often at least 8 times in 24 hours.    Breastfeeding facts  Breast milk is the best source of nutrition for your baby and is available at birth. In the first couple of days, your milk volume is already starting to increase, though it may not be noticeable. Breastfeed frequently to increase your milk supply. Within three to five days, you will begin to notice larger milk volumes. An increase in breast size, heaviness and firmness are often described as the milk  coming in.  Frequent breastfeeding can help breasts from getting overly firm and painful. You will know the baby is getting enough milk if your baby is having wet and dirty diapers and gaining weight.     If your goal is to exclusively breastfeed, it is important to not use any formula or artificial nipples (including bottles and pacifiers) while your baby is learning to breastfeed.  While it may seem like an  easy  option to give your baby a bottle, formula should only be given if there is a medical reason for your baby to have it.      Positioning and attachment   Get comfortable.  Use pillows as needed to support your arms and  baby.  Hold baby close at the level of your breast, facing you in a tummy to tummy position.  Skin to skin helps with this.  Position the baby with his or her nose by the nipple.  There should be a straight line from baby s ear to shoulder to hips.  Tickle your baby s lips or wait for baby to open mouth wide, bring baby to breast by leading with the chin.  Aim the nipple at the roof of baby s mouth.  A rapid sucking pattern is followed by longer, drawing pattern with occasional swallows heard.  When baby is correctly latched, your nipple and much of the areola are pulled well into baby s mouth.      Returning to work or school  Focus on a good start to breastfeeding.  Many women continue to provide breastmilk for their baby when they return to work or school.  Making plans about where to pump and store milk can make the transition go well.  Talk with other mothers who have also returned to work or school for tips and support.  Your employer s Human Resource department may be a resource as well.     Returning to work or school: (continued)     babies can mean fewer  sick  days for you.    A quality breast pump will also save time and add comfort.  Check with your insurance prior to giving birth for breast pump coverage.  Many insurance companies include a pump within your benefits.    Wait until your baby is at least three weeks old to introduce a bottle for the first time.  Have someone besides you give the bottle.    Breastfeed when you are with your baby. Reserve your bottles of breast milk for when you are away.     Your breasts will need to be  emptied  either by your baby or a pump.  Plan to pump at least twice in an eight hour day.    If you cannot pump at work, continue breastfeeding at home. Any amount of breast milk is worth giving to your baby.    Formula feeding facts  If you are planning to use formula to feed your baby, you will want to make some preparations ahead of time. Talk to your doctor  or nurse-midwife about what type of formula to use. Some are iron-fortified, meaning they have extra iron in them. You will want to purchase formula and bottles before your baby is born to be sure you are ready after you return from the hospital. The [facility] do not provide formula samples to take home.    Be sure to follow formula mixing directions closely. Regular milk in the dairy case at the grocery store should not be given to babies under 1 year old. Baby formula is sold in several forms including:    Ready-to-use. This is the most expensive, but no mixing is necessary.    Concentrated liquid. This is less expensive than ready-to-use and you mix with water.    Powder. This is the least expensive. You mix one level scoop of powdered formula with two ounces of water and stir well.    Most babies need 2.5 ounces of formula per pound of body weight each day. This means an 8-pound baby may drink about 20 ounces of formula a day; however, this is just an estimate. The most important thing is to pay attention to your baby s cues.  If your baby is always fussy, needs more iron or has certain food allergies, your physician may suggest you change your baby s formula to a different kind.     How do I warm my baby s bottles?  You may feed your baby a bottle without warming it first. It is OK for the breast milk or formula to be cool or room temperature. If your baby seems to prefer it warmed, you can put the filled bottle in a container of warm water and let it stand for a few minutes. Check the temperature of the liquid on your skin before feeding it to your baby; to be sure it isn t too hot. Do not heat bottles in the microwave. Microwaves heat food and liquids unevenly, and this can cause hot spots that can burn your baby.    How do I clean and sterilize bottles?  Sterilize bottles and nipples before you use them for the first time. You can do this by putting them in boiling water for 5 minutes. After that  first time, you can wash them in hot and soapy water. Rinse them carefully to be sure there is no soap left on them. You can also wash them in the .    [facility] [phone] ([phone])

## 2021-05-29 NOTE — TELEPHONE ENCOUNTER
PHONE NOTE: Called patient to discuss results of labs and blood pressure from yesterday's clinic visit.  All labs are normal.  All signs and symptoms of gestational hypertension/preeclampsia were reviewed and discussed.  Patient is not experiencing any symptoms of preeclampsia at this time.  Patient understands that she should call now to make an appointment for Friday for a blood pressure check and a plan.  May need BPP with THU and single deepest pocket evaluation as well.  Discussed daily fetal movement counting.  Patient knows when to call/come in and has phone numbers.

## 2021-05-29 NOTE — PROGRESS NOTES
Arlyn presents to [facility] by herself for her 35w5d routine PNV visit.Moved out to [place]. She is feeling well in general. Edcuation done. Reviewed [facility] and PTL.  CNM details: peds provider, breast pump and car seat addressed. Looked at wt gain chart--doing well, add more hydration and rest for slight edema. NV GBS cx, hgb and cx check.

## 2021-05-29 NOTE — PATIENT INSTRUCTIONS - HE
Patient Education   HEALTHY PREGNANCY CARE: 30-34 WEEKS PREGNANT    You have made it to the final months of your pregnancy. By now, your baby is starting to fill out with some fat under his skin, fuzzy hair on his shoulders, and is gaining 4 to 6 ounces per week.    Discuss any travel plans with your midwife or physician.    Review possible changes in sexuality during later pregnancy and discuss these with your midwife or physician, as well as your partner. Alternative love-making positions may be more comfortable.    Discuss labor and delivery issues with your midwife or physician. If you had a  birth in the past, discuss a trial of labor with your midwife or physician. He or she may ask that you sign a consent form, if you wish to have a vaginal birth after  (). Ask your midwife or physician to explain your options for managing pain during your labor and delivery. Sometimes, during the birth process, an episiotomy may need to be cut in the vagina to make the opening bigger or let the baby come out quicker. You may want to discuss the episiotomy and how often it is needed with your midwife or physician.    Plan for your baby's care by selecting a child health care provider (Family physician, Pediatrician, or Pediatric Nurse Practitioner). Practice installing an infant car seat correctly in the car. Ask for car seat information as needed and make sure it is safe and will work in the car your baby will ride in. You will need a car seat to bring your baby home from the hospital. Check the procedure for adding your baby to your health care plan. Review your decision about circumcision and ask for any information you need. As you buy and receive items for your baby, don't put a baby walker on your list. Walkers can be dangerous and can cause serious injury to your child. A safer option is a saucer-type play station, since it doesn't allow baby to travel across the floor.    Discuss your choices and  plans for birth control with your midwife or physician. Women who are breastfeeding can still become pregnant. Use a birth control method if you want to lower your pregnancy risk. Talk to your midwife or physician if you are considering permanent birth control, such as tubal ligation or Essure. You may need to complete a consent form 30 days prior to delivery in order to have this done after you deliver.    Continue to watch for signs and symptoms of preeclampsia:     Sudden swelling of your face, hands, or feet     New vision problems such as blurring, double vision, or flashing lights    A severe headache not relieved with acetaminophen (Tylenol)    Sharp or stabbing pain in your right or middle upper abdomen    Watch for signs and symptoms of premature labor:     Regular contractions. This means having about 6 or more within 1 hour, even after you have had a glass of water and are resting.     A backache that starts and stops regularly.    An increase or change in vaginal discharge, such as heavy, mucus-like, watery, or bloody discharge.     Your water breaks or leaks.    If you have any of the above symptoms or any other concerns, call your provider or their clinic staff at [facility] at Phone: [phone]. If it's after clinic hours, physician patients should call the [facility] Connection at [phone]); midwife patients should call their answering service at [phone].    How can you care for yourself at home?   You can refer to the Starting Out Right book or find it online at [email or web address] or [email or web address]/[email or web address]/[email or web address]-starting-out-[email or web address]-starting-out-[email or web address]#p=[email or web address]     You can sign up for a weekly parenting e-mail that gives support, tips and advice from health care professionals that starts with pregnancy and continues through the toddler years. To register, go to  [email or web address] at any time during your pregnancy.     Patient Education   HEALTHY PREGNANCY CARE: 34-36 WEEKS PREGNANT    Your baby is gaining about an ounce each day, so healthy nutrition and rest are still very important. Learn about late pregnancy symptoms, such as constipation and backaches. Drinking more fluids and eating more fiber can relieve constipation. The pelvic tilt and a heating pad can ease backaches.    Continue to watch for signs and symptoms of preeclampsia:     Sudden swelling of your face, hands, or feet     New vision problems such as blurring, double vision, or flashing lights    A severe headache not relieved with acetaminophen (Tylenol)    Sharp or stabbing pain in your right or middle upper abdomen    You're almost done with your pregnancy but it's still too soon to have your baby. The goal is to have your baby after 37 weeks, so watch for signs and symptoms of premature labor:     Regular contractions. This means having about 6 or more within 1 hour, even after you have had a glass of water and are resting.     A backache that starts and stops regularly.    An increase or change in vaginal discharge, such as heavy, mucus-like, watery, or bloody discharge.     Your water breaks or leaks.    You will be tested for group B streptococcus (GBS), a type of bacteria found in 10-30% of pregnant women. A woman with GBS can pass it to her baby during delivery. Most babies who get GBS from their mothers do not have any problems, but some babies get very ill and need to be hospitalized for antibiotic treatment. Treating you with antibiotics during labor and delivery helps to prevent infection in your baby.    Review information on postpartum care that you will need after the baby is born.  Discuss your choices and plans for birth control with your midwife or physician.     Postpartum Care  During the days and weeks after the delivery of your baby (postpartum period), your body will change as  "it returns to its nonpregnant condition. As with pregnancy changes, postpartum changes are different for every woman.    Physical changes after childbirth  The changes in your body may include:    Contractions called afterpains shrink the uterus for several days after childbirth. Shrinking of the uterus to its prepregnancy size may take 6 to 8 weeks.    Sore muscles (especially in the arms, neck, or jaw) are common after childbirth. This is because of the hard work of labor and pushing your baby out. The soreness should go away in a few days.    Bleeding and vaginal discharge (lochia) may last for 2 to 8 weeks, and can come and go for about 2 months.    Vaginal soreness, including pain, discomfort, and numbness, is common after vaginal birth. Soreness may be worse if you had a perineal tear or episiotomy.    If you had a  birth (), you may have pain in your lower abdomen and may need pain medicine for 1 to 2 weeks.    Breast engorgement is common around the 3rd or 4th day after delivery, when the breasts begin to fill with milk. This can cause discomfort and swelling. If you are breast feeding, the best treatment is to feed your baby often or pump the milk out. You can also use warm compresses. If you are not breast feeding, placing ice packs on your breasts, taking a hot shower, or using warm compresses may relieve the discomfort.    Be aware of postpartum depression    \"Baby blues\" are common for the first 1 to 2 weeks after birth. You may cry or feel sad or irritable for no reason.     For some women, these feelings last longer and are more intense. This is called postpartum depression.     If your symptoms last for more than a few weeks or you feel very depressed, ask your midwife or physician for help.     Postpartum depression can be treated. Support groups and counseling can help, but sometimes medication is needed.     Discuss follow-up appointments with your midwife or physician, as well. " He or she will usually want to see you 6 weeks after the birth of your baby, sooner if you are having problems.    If you have questions about any symptoms you are experiencing or any other concerns, call your provider or their clinic staff at [facility] at Phone: [phone]. If it's after clinic hours, physician patients should call the [facility] Connection at [phone] ([phone]); midwife patients should call their answering service at [phone].    How can you care for yourself at home?   You can refer to the Starting Out Right book or find it online at [email or web address]/[email or web address]/[email or web address]/[email or web address]-[email or web address]-Right.pdf or http://[email or web address]/images/stories/flipbooks/[facility]-starting-out-right/[facility]-starting-out-right.html#p=8     You can sign up for a weekly parenting e-mail that gives support, tips and advice from health care professionals that starts with pregnancy and continues through the toddler years. To register, go to [email or web address] at any time during your pregnancy.        Making Plans for Feeding My Baby    By this point, you probably have read a lot about feeding your baby.  Breastfeed or formula? Each mother s decision is her own and [facility] respects you and your choices. We ve gathered information on both breastfeeding and formula feeding to help with your decision. Talking with your physician or nurse-midwife can also help in your decision.  However you plan to feed your baby, [facility] encourage rooming in with your baby, skin-to-skin contact and feeding your baby based on his or her cues.    Skin-to-skin contact  Being close to mom helps your baby adjust to life outside of the womb.  It helps your baby regulate their body temperature, heart rate, and breathing.  Your baby will usually be placed skin-to-skin immediately following birth or as soon as possible, if medical  intervention is needed.    Rooming-In  Having your baby stay with you in your room is called  rooming-in .  Keeping your baby in your room helps you to learn how to care for your baby by getting to know your baby s cues, body rhythms and sleep cycle.       Cue-based feeding  Cues (signals) are baby s way of telling you what he or she wants.  When you learn your infant s cues, you know how to care for and feed your baby.   Feeding cues are the licking and smacking of lips, bringing their fist to their mouth, and a reflex called  rooting - where baby turns and opens his or her mouth, searching for the breast or bottle.  Crying is a late feeding cue.  Babies can feed frequently, often at least 8 times in 24 hours.  Breastfeeding facts  Breast milk is the best source of nutrition for your baby and is available at birth. In the first couple of days, your milk volume is already starting to increase, though it may not be noticeable. Breastfeed frequently to increase your milk supply. Within three to five days, you will begin to notice larger milk volumes. An increase in breast size, heaviness and firmness are often described as the milk  coming in.  Frequent breastfeeding can help breasts from getting overly firm and painful. You will know the baby is getting enough milk if your baby is having wet and dirty diapers and gaining weight.     If your goal is to exclusively breastfeed, it is important to not use any formula or artificial nipples (including bottles and pacifiers) while your baby is learning to breastfeed.  While it may seem like an  easy  option to give your baby a bottle, formula should only be given if there is a medical reason for your baby to have it.    Positioning and attachment   Get comfortable.  Use pillows as needed to support your arms and baby.  Hold baby close at the level of your breast, facing you in a tummy to tummy position.  Skin to skin helps with this.  Position the baby with his or her nose  by the nipple.  There should be a straight line from baby s ear to shoulder to hips.  Tickle your baby s lips or wait for baby to open mouth wide, bring baby to breast by leading with the chin.  Aim the nipple at the roof of baby s mouth.  A rapid sucking pattern is followed by longer, drawing pattern with occasional swallows heard.  When baby is correctly latched, your nipple and much of the areola are pulled well into baby s mouth.      Returning to work or school  Focus on a good start to breastfeeding.  Many women continue to provide breastmilk for their baby when they return to work or school.  Making plans about where to pump and store milk can make the transition go well.  Talk with other mothers who have also returned to work or school for tips and support.  Your employer s Human Resource department may be a resource as well.     Returning to work or school: (continued)     babies can mean fewer  sick  days for you.    A quality breast pump will also save time and add comfort.  Check with your insurance prior to giving birth for breast pump coverage.  Many insurance companies include a pump within your benefits.    Wait until your baby is at least three weeks old to introduce a bottle for the first time.  Have someone besides you give the bottle.    Breastfeed when you are with your baby. Reserve your bottles of breast milk for when you are away.     Your breasts will need to be  emptied  either by your baby or a pump.  Plan to pump at least twice in an eight hour day.    If you cannot pump at work, continue breastfeeding at home. Any amount of breast milk is worth giving to your baby.    Formula feeding facts  If you are planning to use formula to feed your baby, you will want to make some preparations ahead of time. Talk to your doctor or nurse-midwife about what type of formula to use. Some are iron-fortified, meaning they have extra iron in them. You will want to purchase formula and bottles  before your baby is born to be sure you are ready after you return from the hospital. The [facility] do not provide formula samples to take home.    Be sure to follow formula mixing directions closely. Regular milk in the dairy case at the grocery store should not be given to babies under 1 year old. Baby formula is sold in several forms including:    Ready-to-use. This is the most expensive, but no mixing is necessary.    Concentrated liquid. This is less expensive than ready-to-use and you mix with water.    Powder. This is the least expensive. You mix one level scoop of powdered formula with two ounces of water and stir well.    Most babies need 2.5 ounces of formula per pound of body weight each day. This means an 8-pound baby may drink about 20 ounces of formula a day; however, this is just an estimate. The most important thing is to pay attention to your baby s cues.  If your baby is always fussy, needs more iron or has certain food allergies, your physician may suggest you change your baby s formula to a different kind.   How do I warm my baby s bottles?  You may feed your baby a bottle without warming it first. It is OK for the breast milk or formula to be cool or room temperature. If your baby seems to prefer it warmed, you can put the filled bottle in a container of warm water and let it stand for a few minutes. Check the temperature of the liquid on your skin before feeding it to your baby; to be sure it isn t too hot. Do not heat bottles in the microwave. Microwaves heat food and liquids unevenly, and this can cause hot spots that can burn your baby.    How do I clean and sterilize bottles?  Sterilize bottles and nipples before you use them for the first time. You can do this by putting them in boiling water for 5 minutes. After that first time, you can wash them in hot and soapy water. Rinse them carefully to be sure there is no soap left on them. You can also wash them in the  yanely.    [facility] [phone] ([phone])     Patient Education   HEALTHY PREGNANCY CARE: 37 to 41 WEEKS PREGNANT    Talk with your midwife or physician about when to call with signs of labor    Regular uterine contractions that are getting closer together and/or stronger    If you think your water has broken or is leaking    Bleeding from the vagina like a period (bloody vaginal discharge is normal)    If you are not feeling your baby move    Make plans for transportation and  as needed for when you are going to the hospital.    Your midwife or physician may offer to check your cervix for changes.     Ask your health care provider about vaccinations you may need following delivery. By now, you should have received a Tdap immunization to protect against pertussis or whooping cough. Fathers and family members who will be in close contact with the baby should also receive a Tdap shot at least two weeks before the expected birth of the baby if they have not had a Td (tetanus) shot for at least two years.    If you are past your due date, discuss the next steps leading to delivery with your midwife or physician. If you don't start labor on your own by 41 or 42 weeks, your midwife or physician may recommend giving you medicines to ripen your cervix and start labor.    Preparing for your baby: Tell your midwife or physician how you plan to feed your baby (breast or bottle), who you have chosen to do pediatric care for your baby, and if you have a boy, whether you have chosen to have him circumcised. You will need a car seat correctly installed in your vehicle to bring your baby home. As you start to set up the nursery at home for your baby, make sure the crib is safe. The mattress needs to fit snugly against the edges of the crib. If you can fit a soda can between the bars, they are too far apart and can allow the baby's head to caught between them.    Learn about infant care and feeding,  including information about infant CPR. We recommend that you put your baby to sleep on his or her back to reduce the chance of Sudden Infant Death Syndrome (SIDS). To maintain a healthy environment in which your child can grow, it's best to keep your home smoke-free. By preparing ahead, your transition into parenthood will go smoothly for you and your baby.    Your midwife or physician will want to see you for a checkup 2 to 6 weeks after delivery.    If you have questions about any symptoms you are experiencing or any other concerns, call your provider or their clinic staff at [facility] at Phone: [phone]. If it's after clinic hours, physician patients should call the [facility] Connection at [phone]); midwife patients should call their answering service at [phone].    How can you care for yourself at home?   You can refer to the Starting Out Right book or find it online at [email or web address]/[email or web address]/maternity/[facility]-[email or web address]-Right.pdf or [email or web address]/images/[email or web address]/[email or web address]starting[facility]-starting-out-right.html#p=8     You can sign up for a weekly parenting e-mail that gives support, tips and advice from health care professionals that starts with pregnancy and continues through the toddler years. To register, go to [email or web address] at any time during your pregnancy.        Making Plans for Feeding My Baby    By this point, you probably have read a lot about feeding your baby.  Breastfeed or formula? Each mother s decision is her own and [facility] respects you and your choices. We ve gathered information on both breastfeeding and formula feeding to help with your decision. Talking with your physician or nurse-midwife can also help in your decision.  However you plan to feed your baby, [facility] encourage rooming in with your baby, skin-to-skin contact and feeding your baby based on his  or her cues.    Skin-to-skin contact  Being close to mom helps your baby adjust to life outside of the womb.  It helps your baby regulate their body temperature, heart rate, and breathing.  Your baby will usually be placed skin-to-skin immediately following birth or as soon as possible, if medical intervention is needed.    Rooming-In  Having your baby stay with you in your room is called  rooming-in .  Keeping your baby in your room helps you to learn how to care for your baby by getting to know your baby s cues, body rhythms and sleep cycle.       Cue-based feeding  Cues (signals) are baby s way of telling you what he or she wants.  When you learn your infant s cues, you know how to care for and feed your baby.   Feeding cues are the licking and smacking of lips, bringing their fist to their mouth, and a reflex called  rooting - where baby turns and opens his or her mouth, searching for the breast or bottle.  Crying is a late feeding cue.  Babies can feed frequently, often at least 8 times in 24 hours.  Breastfeeding facts  Breast milk is the best source of nutrition for your baby and is available at birth. In the first couple of days, your milk volume is already starting to increase, though it may not be noticeable. Breastfeed frequently to increase your milk supply. Within three to five days, you will begin to notice larger milk volumes. An increase in breast size, heaviness and firmness are often described as the milk  coming in.  Frequent breastfeeding can help breasts from getting overly firm and painful. You will know the baby is getting enough milk if your baby is having wet and dirty diapers and gaining weight.     If your goal is to exclusively breastfeed, it is important to not use any formula or artificial nipples (including bottles and pacifiers) while your baby is learning to breastfeed.  While it may seem like an  easy  option to give your baby a bottle, formula should only be given if there is a  medical reason for your baby to have it.    Positioning and attachment   Get comfortable.  Use pillows as needed to support your arms and baby.  Hold baby close at the level of your breast, facing you in a tummy to tummy position.  Skin to skin helps with this.  Position the baby with his or her nose by the nipple.  There should be a straight line from baby s ear to shoulder to hips.  Tickle your baby s lips or wait for baby to open mouth wide, bring baby to breast by leading with the chin.  Aim the nipple at the roof of baby s mouth.  A rapid sucking pattern is followed by longer, drawing pattern with occasional swallows heard.  When baby is correctly latched, your nipple and much of the areola are pulled well into baby s mouth.      Returning to work or school  Focus on a good start to breastfeeding.  Many women continue to provide breastmilk for their baby when they return to work or school.  Making plans about where to pump and store milk can make the transition go well.  Talk with other mothers who have also returned to work or school for tips and support.  Your employer s Human Resource department may be a resource as well.     Returning to work or school: (continued)     babies can mean fewer  sick  days for you.    A quality breast pump will also save time and add comfort.  Check with your insurance prior to giving birth for breast pump coverage.  Many insurance companies include a pump within your benefits.    Wait until your baby is at least three weeks old to introduce a bottle for the first time.  Have someone besides you give the bottle.    Breastfeed when you are with your baby. Reserve your bottles of breast milk for when you are away.     Your breasts will need to be  emptied  either by your baby or a pump.  Plan to pump at least twice in an eight hour day.    If you cannot pump at work, continue breastfeeding at home. Any amount of breast milk is worth giving to your baby.    Formula  feeding facts  If you are planning to use formula to feed your baby, you will want to make some preparations ahead of time. Talk to your doctor or nurse-midwife about what type of formula to use. Some are iron-fortified, meaning they have extra iron in them. You will want to purchase formula and bottles before your baby is born to be sure you are ready after you return from the hospital. [facility] do not provide formula samples to take home.    Be sure to follow formula mixing directions closely. Regular milk in the dairy case at the grocery store should not be given to babies under 1 year old. Baby formula is sold in several forms including:    Ready-to-use. This is the most expensive, but no mixing is necessary.    Concentrated liquid. This is less expensive than ready-to-use and you mix with water.    Powder. This is the least expensive. You mix one level scoop of powdered formula with two ounces of water and stir well.    Most babies need 2.5 ounces of formula per pound of body weight each day. This means an 8-pound baby may drink about 20 ounces of formula a day; however, this is just an estimate. The most important thing is to pay attention to your baby s cues.  If your baby is always fussy, needs more iron or has certain food allergies, your physician may suggest you change your baby s formula to a different kind.   How do I warm my baby s bottles?  You may feed your baby a bottle without warming it first. It is OK for the breast milk or formula to be cool or room temperature. If your baby seems to prefer it warmed, you can put the filled bottle in a container of warm water and let it stand for a few minutes. Check the temperature of the liquid on your skin before feeding it to your baby; to be sure it isn t too hot. Do not heat bottles in the microwave. Microwaves heat food and liquids unevenly, and this can cause hot spots that can burn your baby.    How do I clean and sterilize  bottles?  Sterilize bottles and nipples before you use them for the first time. You can do this by putting them in boiling water for 5 minutes. After that first time, you can wash them in hot and soapy water. Rinse them carefully to be sure there is no soap left on them. You can also wash them in the .    [facility] [phone] ([phone])

## 2021-05-29 NOTE — PROGRESS NOTES
Arlyn presents to [place] clinic by herself for her routine PNV 36w5d. Pt is feeling well. GBS cx today, hemoglobin. cx check.  Labor instructions reviewed. DFMC and PTL reviewed. PIH s/s reviewed and denied--denies headache, nausea, vomiting, blurry vision, double vision, flashing lights, epigastric pain, no swelling.   Looked at wt gain chart and doing well.  Had only 1 pound of weight gain this past week.  Spectra breast pump given in clinic today.  Slight elevation in BP today--pt rushing and stressed about being late. Repeat BP still elevated, so ordered HELLP panel stat.   GBS cx hemoglobin and cx check  Today. SVE cx cl/long/soft/vtx -2 very post    Addendum: Called pt back with lab results but no answer.   HELLP labs all WNL, PCR level pending.   LMTCB for pt and for her to have a BP check in clinic on Friday.  This CNM will attempt to contact pt again tomorrow with plan.  Pt knows to call/come in with any s/s of preeclampsia as discussed today in clinic.  Has phone numbers.

## 2021-05-29 NOTE — PROGRESS NOTES
Next visit: Hemoglobin and GBS RV culture.  Arlyn presents to the clinic today by herself.  EPDS .  Baby is active, and she denies regular uterine contractions, loss of fluid or vaginal bleeding.  Acid reflux symptoms occasionally.  Encouraged use of Tums and Zantac.  Eczema is flaring on eyelids, earlobes, antecubital and popliteal spaces.  Recommend daily moisturizing including twice daily topical cortisone cream or ointment to affected areas.  As well, continue oral antihistamine daily.  Moving to [place] on 2019.  Patient knows not to lift, push or pull anything greater than 20 pounds.  Hiring a moving company!   labor precautions and danger signs and symptoms reviewed.  All questions answered.  Encouraged daily fetal movement counting and to call or return to clinic with any questions, concerns, or as needed.

## 2021-05-29 NOTE — PROGRESS NOTES
Here alone today for a follow up to elevated BP reading at last routine visit. She reports no HA, vision changes, RUQ/epigastric pain or new onset swelling. She notes normal FM and no regular UCs. Today she is normotensive and so we discussed a return to her normal visit schedule with ongoing monitoring for new onset symptoms. She feels ready for her upcoming labor and offers no questions at this time. She will work on getting her car ready for the baby's car seat this weekend! Reviewed normal labs from last visit. Reviewed warning s/sx and reasons to call. RTC 1 week.

## 2021-05-30 ENCOUNTER — AMBULATORY - HEALTHEAST (OUTPATIENT)
Dept: FAMILY MEDICINE | Facility: CLINIC | Age: 36
End: 2021-05-30

## 2021-05-30 DIAGNOSIS — Z34.90 ENCOUNTER FOR INDUCTION OF LABOR: ICD-10-CM

## 2021-05-30 LAB
SARS-COV-2 PCR COMMENT: NORMAL
SARS-COV-2 RNA SPEC QL NAA+PROBE: NEGATIVE
SARS-COV-2 VIRUS SPECIMEN SOURCE: NORMAL

## 2021-05-30 NOTE — TELEPHONE ENCOUNTER
Patient called back  She will be off work for 6 weeks post partum.  She plans to take off work for 12 weeks FMLA  So her target date to go back to work is 9-19-19 Full Time.    Form filled out  Waiting for CNM to sign and then will fax

## 2021-05-30 NOTE — PROGRESS NOTES
"Assessment:        Arlyn Escalera is a 34 y.o. female here for postpartum follow up at 2 weeks postpartum.   Plan:        1. 2 wk postpartum check in today  2.  Normal postpartum guidance  3.  Return to clinic at 6 weeks postpartum    Subjective:       Arlyn Escalera is a 34 y.o. female who presents for a postpartum follow up visit.  She presents today with her  Tan and daughter \"Alina\".  She is 2 weeks postpartum following a spontaneous vaginal delivery. I have fully reviewed the prenatal and intrapartum course. The delivery was at 38 gestational weeks.  Patient was diagnosed with gestational hypertension and was induced.  Patient had an epidural for comfort.  Baby \"Alina\" weighed 5 pounds 15 ounces and had Apgars of 8 and 9. outcome: spontaneous vaginal delivery on 6/27/19. The amount and color of the lochia is appropriate for the duration of recovery. The patient feels well. The baby is well and being fed by breast  (using nipple sheild) and bottle (breast milk and supplementing formula). Has seen lactation consultant and was guided toward supplementing. Voiding without difficulty, lochia normal, tolerating normal diet, and passing flatus.  Pain is well controlled with current medications, ibuprofen.  She states that she is a little sore but healing well the patient has no emotional concerns.  Patient states that she is happy with the birth process.  She states she is proud of herself but when asked in more detail about the birth she says \"the 3-hour push was really long and intense \".  Patient states that baby and he is growing but they would like to see her gain more weight so she is supplementing formula as well.  Postpartum depression screening score: 0/30. Will still soak in tub more now that she is home from the cabin. Healing well, no pain.  Patient states that she has a lot of help from Peter and family.  Rx given for micronor x 6 months then switch to another form of BC. May consider Mirena IUD " and will let us know if she needs a thorough consent at her 6 wk PP visit.      Review of Systems  General:  Denies problem  Eyes: Denies problem  Ears/Nose/Throat: Denies problem  Cardiovascular: Denies problem  Respiratory:  Denies problem  Gastrointestinal:  Denies problem, Genitourinary: Denies problem  Musculoskeletal:  Denies problem  Skin: Denies problem  Neurologic: Denies problem  Psychiatric: Denies problem  Endocrine: Denies problem  Heme/Lymphatic: Denies problem   Allergic/Immunologic: Denies problem          Objective:         There were no vitals filed for this visit.     Blood pressure 112/74    Physical Exam:  No exam performed today, counseling only 20 min    General Appearance: Alert, cooperative, no distress, appears stated age    Total time with patient 20 minutes Greater than 50% of time spent with patient was counseling, education and coordination of care.  Patito ENRIQUE, RONANM

## 2021-05-30 NOTE — ANESTHESIA POSTPROCEDURE EVALUATION
Patient: Arlyn Escalera  * No procedures listed *  Anesthesia type: epidural    Patient location: room  Last vitals:   Vitals Value Taken Time   /95 6/27/2019  7:31 PM   Temp  6/27/2019  7:37 PM   Pulse 113 6/27/2019  7:31 PM   Resp  6/27/2019  7:37 PM   SpO2  6/27/2019  7:37 PM     Post vital signs: stable  Level of consciousness: awake and responds to simple questions  Post-anesthesia pain: pain controlled  Post-anesthesia nausea and vomiting: no  Pulmonary: unassisted, return to baseline  Cardiovascular: stable and blood pressure at baseline  Hydration: adequate  Anesthetic events: no    QCDR Measures:  ASA# 11 - Veronica-op Cardiac Arrest: ASA11B - Patient did NOT experience unanticipated cardiac arrest  ASA# 12 - Veronica-op Mortality Rate: ASA12B - Patient did NOT die  ASA# 13 - PACU Re-Intubation Rate: ASA13B - Patient did NOT require a new airway mgmt  ASA# 10 - Composite Anes Safety: ASA10A - No serious adverse event    Additional Notes:

## 2021-05-30 NOTE — TELEPHONE ENCOUNTER
"Forms received  Left message for patient to call back    The form asks for \"Return to Work Target Date\"   \"Full Time\"  \"Part Time\"     And \"Return to Work Plan\"    Usually we write in \"Will be off work for 6 weeks. Patient plans to take off ____weeks for FMLA\"     Called to find out how many weeks FMLA she is planning to take.    "

## 2021-05-30 NOTE — PROGRESS NOTES
Arlyn presents to the clinic today by herself.  Denies headache, visual disturbances or upper abdominal pain.  Noticed [place] contractions have become more frequent.  Baby is active.  Denies regular uterine contractions, loss of fluid or vaginal bleeding.  Evaluation on 6/18/2019 for elevated blood pressure, HELLP labs WNL essentially.  Today, blood pressure initially elevated at 124/92 and rechecked to be 152/98.  Discussed diagnosis of gestational hypertension and recommend evaluation at [facility] with NST, repeat labs, BPP and most likely IOL.  Cervical examination deferred in the office.  All questions answered.  Danger signs and symptoms reviewed.  Report given to charge nurse Elvie at [facility], and this writer will attempt to contact on-call midwife IVA Gorman, NAT

## 2021-05-30 NOTE — TELEPHONE ENCOUNTER
Call to [facility]  Spoke to Jimmie  No forms were received last week  He said they refaxed the form again today but I have not received it at # [phone]    Asked to refax to main number [phone] today

## 2021-05-30 NOTE — PROGRESS NOTES
"Routine Postpartum Visit      Subjective:       Arlyn is a 34 y.o. year old female who presents to clinic today for a postpartum visit.  She is currently 6 weeks postpartum of a  birth.  The birth happened on 19.  Delivery with NAT JUARES. The baby was 38 weeks gestation at the time of the birth, and she had been induced for gestational hypertension.  She attends the visit today with Alina. Pt also had a 2 wk PP visit on 19.  Has Rx for minipill and had no questions about Mirena IUD.  Understood proper use and warnings for minipill.  Also discussed switching to an OCP at about 6 months patient also knows proper use and warnings for this as well.    Arlyn's postpartum course has been smooth.  Baby Alina's  course has been smooth.     Feelings about how her birth went:  Happy with it. \"I loved [place]!\".  Bleeding Status:  Bled for 3 weeks.  Currently has no bleeding.  Sleeping Status:  interrupted but ok  Eating:  Well and has appetite  Bowel/Bladder Function:  Bowel function is normal. Bladder function is normalSI just a little with cough/laugh/sneeze.  Patient declines physical therapy referral at this time.  Exercise:  Walks and little exercise  Casar:  has not resumed intercourse. Pt and partner have been out on a date. Grandparents for babysitting.  Pain:  none.  Contraceptive Plans:  Planned contraception method is minipill--will  soon.  Baby's Feeding Method:  Baby is feeding by both breast and bottle. Working with lactation and guided toward supplementing  Mental Health/Mood Status:  Feeling well. No depression.  [place]  Depression Scale score of 0/30 today.  Work Plans:  Will  be returning to work.  .      Review of Systems  -A 12 point comprehensive review of systems was negative except as noted above.  -Prenatal history and intrapartum course were also reviewed today.        Objective:         There were no vitals filed for this visit.  " Normotensive    Physical Exam:    General Appearance: Pleasant, articulate, well-groomed, well-nourished female.  Not in any apparent distress.    Neck: Supple, symmetrical, no adenopathy.  Thyroid:  Not enlarged, symmetric, no tenderness/mass/nodules  Back: no CVA tenderness  Breasts: Symmetrical, non-tender.  No masses, adenopathy, or nipple discharge.  Abdomen: Soft, non-tender, no masses, diastasis fingertip only.  Pelvic:  -External genitalia:  Normal hair distribution, no lesion.   Birth laceration site well healed.    -Urethral opening: Without lesions, or tenderness.   -Bladder: Without masses, or tenderness.  -Vagina: Pink, rugated, normal-appearing discharge.  -Cervix: Closed, parous, pink, smooth, no lesions.  Pap smear not obtained. Due 2023.  -Bimanual: Finds uterus small, mobile, nontender, no masses.  Negative CMT.  Adnexa, without masses or tenderness.    Extremities: Extremities no edema, no varicosities.      Assessment/Plan:     1)  Normal 6 wk postpartum visit  2)  Contraceptive plans include:  oral progesterone-only contraceptive.  Educated about this method. Recommended on 6 months on this method and then pt should switch to a different method. Pt has taken OCP's before so she could start these at 6 months PP. Discussed proper use and warnins.  3)  General postpartum education completed including:  Self-care of physical and emotional needs in this new postpartum period, return of fertility, resumption of intercourse, discussion of general health maintenance, sleep needs, required support of family/friends/community, and watching for signs/symptoms of  mood and anxiety disorders.  4) Labs include hemoglobin and Vit D.  5) Next pap due 2023, RTC in 1 yr for annual exam.    With pt TT = 40 minutes of time of which >50% on education/counseling/care-coordination

## 2021-05-30 NOTE — ANESTHESIA PROCEDURE NOTES
Epidural Block    Patient location during procedure: OB  Time Called: 6/27/2019 9:35 AM  Reason for Block:labor epidural  Staffing:  Performing  Anesthesiologist: Dano Clancy MD  Preanesthetic Checklist  Completed: patient identified, risks, benefits, and alternatives discussed, timeout performed, consent obtained, at patient's request, airway assessed, oxygen available, suction available, emergency drugs available and hand hygiene performed  Procedure  Patient position: sitting  Prep: ChloraPrep  Patient monitoring: continuous pulse oximetry, heart rate and blood pressure  Approach: midline  Location: L3-L4  Injection technique: RADHA saline  Number of Attempts:1  Needle  Needle type: Systancia   Needle gauge: 18 G     Catheter in Space: 4  Assessment  Sensory level:  No complications      Additional Notes:  After negative aspirate for heme/CSF, a test dose was given using three ml 1.5% xylocaine with epinephrine 1:200K.  Test dose was negative.  Catheter was taped securely.  Epidural catheter was then dosed with 8ml 2% xylo w/ epi 1:200K  Epidural infusion initiated by OB RN as per orders.  Procedure was well tolerated.

## 2021-05-30 NOTE — TELEPHONE ENCOUNTER
Name of caller: Apurva from [facility]  Phone number where you may be reached: [phone]  Reason for call: pls call Apurva re a form that was faxed to [facility] on 7/3. They do have the correct fax number for Madeline  Best time to call back: any  If we don't reach you, is it okay to leave a detailed message? yes

## 2021-05-30 NOTE — ANESTHESIA PREPROCEDURE EVALUATION
Anesthesia Evaluation      Patient summary reviewed   No history of anesthetic complications     Airway   Mallampati: I  Neck ROM: full   Pulmonary - negative ROS and normal exam                          Cardiovascular - negative ROS and normal exam  (+) hypertension (Gestational - labs WNL), ,      Neuro/Psych - negative ROS   (+) anxiety/panic attacks,     Endo/Other - negative ROS   (+) pregnant     GI/Hepatic/Renal - negative ROS   (+) GERD well controlled,             Dental - normal exam                        Anesthesia Plan  Planned anesthetic: epidural    ASA 2     Anesthetic plan and risks discussed with: patient    Post-op plan: routine recovery

## 2021-05-31 ENCOUNTER — COMMUNICATION - HEALTHEAST (OUTPATIENT)
Dept: SCHEDULING | Facility: CLINIC | Age: 36
End: 2021-05-31

## 2021-06-02 ENCOUNTER — RECORDS - HEALTHEAST (OUTPATIENT)
Dept: ADMINISTRATIVE | Facility: OTHER | Age: 36
End: 2021-06-02

## 2021-06-02 ENCOUNTER — SURGERY - HEALTHEAST (OUTPATIENT)
Dept: OBGYN | Facility: CLINIC | Age: 36
End: 2021-06-02
Payer: COMMERCIAL

## 2021-06-02 ENCOUNTER — ANESTHESIA - HEALTHEAST (OUTPATIENT)
Dept: OBGYN | Facility: CLINIC | Age: 36
End: 2021-06-02

## 2021-06-02 ENCOUNTER — TRANSFERRED RECORDS (OUTPATIENT)
Dept: HEALTH INFORMATION MANAGEMENT | Facility: CLINIC | Age: 36
End: 2021-06-02

## 2021-06-02 VITALS — WEIGHT: 173 LBS | BODY MASS INDEX: 30.65 KG/M2 | HEIGHT: 63 IN

## 2021-06-02 ASSESSMENT — MIFFLIN-ST. JEOR: SCORE: 1516.43

## 2021-06-03 VITALS — WEIGHT: 169 LBS | BODY MASS INDEX: 29.95 KG/M2 | HEIGHT: 63 IN

## 2021-06-03 VITALS — HEIGHT: 63 IN | BODY MASS INDEX: 31.54 KG/M2 | WEIGHT: 178 LBS

## 2021-06-03 VITALS — WEIGHT: 185 LBS | HEIGHT: 63 IN | BODY MASS INDEX: 32.78 KG/M2

## 2021-06-03 VITALS — HEIGHT: 63 IN | BODY MASS INDEX: 32.78 KG/M2 | WEIGHT: 185 LBS

## 2021-06-03 VITALS — HEIGHT: 63 IN | BODY MASS INDEX: 32.6 KG/M2 | WEIGHT: 184 LBS

## 2021-06-03 VITALS — HEIGHT: 63 IN | WEIGHT: 175 LBS | BODY MASS INDEX: 31.01 KG/M2

## 2021-06-03 VITALS — HEIGHT: 63 IN | BODY MASS INDEX: 31.01 KG/M2 | WEIGHT: 175 LBS

## 2021-06-03 VITALS — HEIGHT: 63 IN | WEIGHT: 179 LBS | BODY MASS INDEX: 31.71 KG/M2

## 2021-06-03 VITALS — WEIGHT: 186 LBS | BODY MASS INDEX: 32.96 KG/M2 | HEIGHT: 63 IN

## 2021-06-10 ENCOUNTER — COMMUNICATION - HEALTHEAST (OUTPATIENT)
Dept: MIDWIFE SERVICES | Facility: CLINIC | Age: 36
End: 2021-06-10

## 2021-06-10 ENCOUNTER — COMMUNICATION - HEALTHEAST (OUTPATIENT)
Dept: SCHEDULING | Facility: CLINIC | Age: 36
End: 2021-06-10

## 2021-06-10 ENCOUNTER — TRANSFERRED RECORDS (OUTPATIENT)
Dept: HEALTH INFORMATION MANAGEMENT | Facility: CLINIC | Age: 36
End: 2021-06-10

## 2021-06-10 LAB
ALT SERPL-CCNC: 34 U/L (ref 0–45)
AST SERPL-CCNC: 20 U/L (ref 0–40)
CREAT SERPL-MCNC: 0.78 MG/DL (ref 0.6–1.1)
GFR SERPL CREATININE-BSD FRML MDRD: >60 ML/MIN/1.73M2
GLUCOSE SERPL-MCNC: 83 MG/DL (ref 70–125)
POTASSIUM SERPL-SCNC: 3.9 MMOL/L (ref 3.5–5)

## 2021-06-12 NOTE — PROGRESS NOTES
PRENATAL VISIT   FIRST OBSTETRICAL EXAM - OB   Assessment / Impression   1.  35-year-old  2 para 1-0-0-1 with IUP at 11+0 weeks by certain LMP 2020, 28-day cycles, JAMIR 2021 (will be 36 years old at JAMIR)  2.  Advanced maternal age  3.  History of gestational hypertension  4.  Prepregnancy BMI 29   5.  Healthcare maintenance  6.  History of anxiety    Plan:   -Ultrasound today/tomorrow due to unable to auscultate fetal heart tones.   -IOB labs drawn. Added thyroid cascade due to family history and Hgb A1C due to BMI.   -Pt is not interested in drawing lead level.   -Patient is not interested in waterbirth. Hep C not drawn today.   -Reviewed prenatal care schedule.   -Optimal nutrition and weight gain discussed. Pregnancy weight gain of 15-25 lbs (BMI 25.0-29.9) encouraged.   -Anticipatory guidance for common pregnancy questions and concerns reviewed.   -Danger s/sx for this trimester reviewed with patient.   -Reviewed genetic screening options with patient.  At this point, she is uncertain whether she will opt for genetic screening this pregnancy.  She understands the choices are as follows: First trimester genetic screening, noninvasive prenatal testing and quad screen.  She plans to check with her insurance company regarding coverage and will communicate with us via [employer] AS if she wants genetic screening.  Please revisit at next prenatal appointment.  -Reviewed carrier testing options with patient, patient does not elect for testing or referral to genetic counseling.  -IOB packet given and reviewed with patient.   -CNM services and hospital options reviewed; emergency and scheduling phone numbers given to patient.   -The patient has the following high risk factors for preeclampsia: history of gestation hypertension. Therefore, low-dose aspirin will be initiated at 12 weeks gestation.  -Antepartum VTE risk factors absent.  -Patient is at increased risk for overt diabetes, so A1C /will not be added to  IOB labs today.  -Pt is not a candidate for an antepartum OB consult.    -Return to clinic in 4 weeks or sooner as needed.    Total time spent with patient: 40 minutes, >50% time spent counseling and coordinating care.   Subjective:   Arlyn Escalera is a 35 y.o.  here today for her first obstetrical exam at 11 weeks. Here by herself. This pregnancy is planned Attempting pregnancy for 3 months. The patient reports nausea, but no vomiting, a little fatigue and some mild breast tenderness. She has a certain last menstrual period was 2020 (exact date)., predicting an expected YOB: 2021. Last period was normal. Her previous three cycles were regular, 28 days. Her pregnancy is dated by LMP.   Arlyn is excited about this pregnancy, states that she has excellent support from  Connor. Her daughter Alina is one and a half years old, she is busy! She reports that she is not feeling as much fatigue with this pregnancy. Wondering if she should start baby aspirin due to history of gestational hypertension with her first pregnancy.   The patient states that she is in a monogamous relationship and states that she is safe. Offered GC/CT screening today and patient declines.  Current symptoms also include: breast tenderness, fatigue, frequent urination, nausea and positive home pregnancy test.   Risk factors: none. Pregnancy Risk Factors: Age is <18 or >35   VTE antepartum risk factors: None  Clinical history/risk factors requiring antepartum OB consult: none.   The patient has the following risk factors for overt diabetes: Body mass index greater than or equal to 25 kg/m2. Plus the additional risk factor(s) of: No additional risk factors.  Social History:   Education level: College graduate   Occupation:  for [employer]   Partners name: Tan ()   ?   OB History    Para Term  AB Living   2 1 1     1   SAB TAB Ectopic Multiple Live Births         0 1      # Outcome  "Date GA Lbr Varghese/2nd Weight Sex Delivery Anes PTL Lv   2 Current            1 Term 06/27/19 38w0d 07:43 / 04:12 5 lb 15 oz (2.693 kg) F Vag-Spont EPI N JEB      History:   Past Medical History:   Diagnosis Date     Anxiety      Spleen laceration 2002    Playing hockey     Varicella     Chickenpox as a child      Past Surgical History:   Procedure Laterality Date     APPENDECTOMY  2000     Lacerated spleen repair  2001    Playing hockey      Family History   Problem Relation Age of Onset     Coronary artery disease Father 50     Heart disease Father      Hypertension Father      Thyroid disease Mother      Alcohol abuse Brother      Cancer Maternal Grandfather      Alcohol abuse Paternal Grandfather      Heart disease Paternal Grandfather       Social History     Tobacco Use     Smoking status: Never Smoker     Smokeless tobacco: Never Used   Substance Use Topics     Alcohol use: Not Currently     Comment: none with pregnancy     Drug use: No      Current Outpatient Medications   Medication Sig Dispense Refill     calcium, as carbonate, (TUMS) 200 mg calcium (500 mg) chewable tablet Chew 1 tablet daily.       prenatal 21/iron fu/folic acid (PRENATAL COMPLETE ORAL) Take by mouth.       No current facility-administered medications for this visit.       Allergies   Allergen Reactions     Levetiracetam Unknown     Morphine Sulfate Unknown      The patient's medical, surgical and family histories were reviewed and were not pertinent to this visit.   Pap smear: Last Pap: 2/2018, Result: NILM, Previous History: none, Any history of abnormal: no. Next Due: 2/2023.       EPDS score today: 0/30. \"Never\" to #10   History of anxiety or depression: yes    Review of Systems   General: Fatigue but otherwise denies problem   Eyes: Denies problem   Ears/Nose/Throat: Denies problem   Cardiovascular: Denies problem   Respiratory: Denies problem   Gastrointestinal: Nausea without vomiting, otherwise negative   Genitourinary: Denies any " "discharge, vaginal bleeding or itchiness or any other problem   Musculoskeletal: Breast tenderness otherwise denies problem   Skin: Denies problem   Neurologic: Denies problem   Psychiatric: Denies problem   Endocrine: Denies problem   Heme/Lymphatic: Denies problem   Allergic/Immunologic: Denies problem       Objective:   Objective    Vitals:    11/04/20 1456   BP: 114/62   Pulse: 88   Weight: 169 lb (76.7 kg)   Height: 5' 3\" (1.6 m)      Physical Exam:   General Appearance: Alert, cooperative, no distress, appears stated age   HEENT: Normocephalic, without obvious abnormality, atraumatic. Conjunctiva/corneas clear.  Neck: Supple, symmetrical, trachea midline, no adenopathy.   Thyroid: not enlarged, symmetric, no tenderness/mass/nodules   Back: Symmetric, no curvature, ROM normal.  Lungs: Clear to auscultation bilaterally, respirations unlabored   Heart: Regular rate and rhythm, S1 and S2 normal, no murmur, rub, or gallop. No edema to lower extremities.   Abdomen: Gravid, soft, non-tender.  FHT: Unable to auscultate with doppler today.    Pelvic Exam: Not done today, will do US tomorrow to confirm dating.   Musculoskeletal: Extremities normal, atraumatic, no cyanosis   Skin: Skin color, texture, turgor normal, no rashes or lesions   Neurologic: Alert and oriented x 3. Normal speech     Patient was seen with student, BRETT Herndon who was present for learning.  I personally assessed, examined and made clinical decisions reflected in the documentation.      -IVA Redd, NAT  "

## 2021-06-12 NOTE — TELEPHONE ENCOUNTER
New Appointment Needed  What is the reason for the visit:  Midwife new pregnancy   Pregnancy Confirmation Appt Needed  When was the first day of your last menstrual cycle?: 08/19/20  Have you done a home pregnancy test?: Yes: 09/23/20.    Provider Preference: Any available  How soon do you need to be seen?: next week  Waitlist offered?: No  Okay to leave a detailed message:  Yes

## 2021-06-12 NOTE — PATIENT INSTRUCTIONS - HE
Welcome to [facility]   and thank you for choosing us for your maternity care provider!  Congratulations!    [facility]  Contact information:  Appointment line and to get a hold of CNM in clinic Monday-Friday 8 am - 5 pm:  [phone].  There are some clinics with early start times (1st appointment 7:40 am) and others with evening hours (last appointment 6:20 pm).  Most are typically open from 8 am to 5 pm.    CNM on call answering service: [phone].  Specify your hospital of choice and leave a brief message for CNM;  will then page CNM who is on call at your specified hospital and you should receive a call back with 15 minutes.  Be sure that your ringer is audible and that you can accept blocked calls so that we can get back in touch with you! This number should be reserved for urgent needs if during the day, before 8 am, after 5 pm, weekends, holidays.      Pregnancy: Body Changes  From conception (fertilization) until after the birth of your child, you and your baby will change every day. To help you understand what is happening, we ve outlined how pregnancy begins and some of the changes you may notice.  How Pregnancy Begins  Conception is the union of a sperm and an egg. When it occurs, your baby s genetic makeup is complete, even its sex. Fertilization takes place in the fallopian tube. The fertilized egg then travels down this tube to the uterus (womb). The egg attaches to the lining of the uterus about a week later. There it grows and is nourished.    Your Changing Body  Pregnancy affects almost every part of your body. You may notice some of the following physical and emotional changes:    Your uterus expands outward and upward as your baby grows. You may feel pressure on your bladder, stomach, and other organs.    You may notice skin color changes on your forehead, nose, and cheeks. A dark line may form from your bellybutton  down to your pubic area. The skin color around your nipples and thighs may also change.    Pink stretch marks may appear on your abdomen, breasts, or hips.    Your hair may seem thicker. You lose less hair during pregnancy.    You may feel fine one day and weepy the next. This is caused by changes in your body, such as increased hormones (chemicals that affect the function of certain organs and also your moods).      Adapting to Pregnancy: First Trimester  As your body adjusts, you may have to change or limit your daily activities. You ll need more rest. You may also need to use the energy you have more wisely.  Eat stomach-friendly foods like cottage cheese, crackers, or bread throughout the day.    Your Changing Body  Almost every part of your body is affected as you adapt to pregnancy. The uterus and cervix will begin to soften right away. You may not look very pregnant during the first three months. But you are likely to have some common signs of early pregnancy:    Nausea    Fatigue    Frequent urination    Mood swings    Bloating of the abdomen    Missed or light periods (first trimester bleeding)    Nipple or breast tenderness, breast swelling      It s Not Too Late to Start Good Habits  What matters most is protecting your baby from this moment on. If you smoke, drink alcohol, or use drugs, now is the time to stop. If you need help, talk with your health care provider.    Smoking increases the risk of stillbirth or having a low-birth-weight baby. If you smoke, quit now.    Alcohol and drugs have been linked with miscarriage, birth defects, intellectual disability, and low birth weight. Do not drink alcohol or take drugs.    Tips to Relieve Nausea  Although nausea can occur at any time of the day, it may be worse in the morning. To help prevent nausea:    Eat small, light meals at frequent intervals.    Get up slowly. Eat a few unsalted crackers before you get out of bed.    Drink water with lemon  slices.    Eat an ice pop in your favorite flavor.    Ask your health care provider about taking arabella or vitamin B6 for nausea and vomiting.    Talk with your health care provider if you take vitamins that upset your stomach.    Work Concerns  The end of the first trimester is a good time to discuss working during pregnancy with your employer. Follow your health care provider s advice if your job requires you to stand for a long time, work with hazardous tools, or even sit at a desk all day. Your workspace, workload, or scheduled hours may need to be adjusted. Perhaps you can change body postures more often or take an extra break.  Advice for Travel  Talk to your health care provider first, but the second trimester may be the best time for any travel. You may be advised to avoid certain trips while you re pregnant. Food and water can be concerns in developing countries. Travel by car is a good choice, as you can stop, get out, and stretch. Bring snacks and water along. Fasten the lap belt below your belly, low over your hips. Also be sure to wear the shoulder harness.  Intimacy  Unless your health care provider tells you to, there is no reason to stop having sex while you re pregnant. You or your partner may notice changes in desire. Desire may be less in the first trimester, due to nausea and fatigue. In the second trimester, sex may be very enjoyable. The third trimester can be a challenge comfort-wise. Try different positions and see what s best for you both.      Pregnancy: Your First Trimester Changes  The first trimester is a time of rapid development for your baby. Because your baby is growing so quickly, it is important that you start a healthy lifestyle right away. By the end of the first trimester, your baby has formed all of its major body organs and weighs just over an ounce.    Month 1 (Weeks 1-4)  The placenta (the organ that nourishes your baby) begins to form. The heart and lungs begin to develop.  Your baby is about 1/4 inch long by the end of the first month.    Month 2 (Weeks 5-8)  All of your baby s major body organs form. The face, fingers, toes, ears, and eyes appear. By the end of the month, your baby is about 1 inch long.    Month 3 (Weeks 9-12)  Your baby can open and close its fists and mouth. The sexual organs begin to form. As the first trimester ends, your baby is about 4 inches long.      Pregnancy: Your Weight  Being a healthy weight is important for both you and your baby. The weight you gain now is not just extra fat. It is also the weight of your baby. And it is the increased blood and fluids to support the baby. A slow, steady rate of gain is best. How much you should gain depends on your weight before getting pregnant. Check with your health care provider to find out what is right for you.    If You Gain Too Much  Gaining too much weight might cause you to feel tired or you could have a harder pregnancy or birth. If you and your health care provider decide you re gaining too much:    Eat fewer fats and sugars. Instead, eat fruit, vegetables, and whole-grain foods.    Drink plenty of water between meals.    Get at least 20 minutes of light exercise, such as walking, each day.    Don t diet. You might not get enough of the nutrients you or your baby needs.    Keep a diet diary to help you gauge what and how much you are eating .    If You re Not Gaining Enough  If you don t gain enough, your baby could be too small or have health problems. Women tend to gain most of their weight in the second and third trimesters. For now:    Eat many types of foods. Make sure you get enough calcium, protein, and carbohydrates.    Don t skip meals.    Eat healthy snacks.    Pick nutrient-dense, high calorie healthy food like trail mix or protein shakes.    See a dietitian for help.    Talk to your healthcare provider if you have had an eating disorder or problems with certain foods.      Pregnancy: Common  Questions  There are plenty of myths and  old wives  tales  surrounding pregnancy. You may need help  fact from fiction. On this sheet, you ll find answers to a few common questions. If you have other questions, talk with a midwife.    Will Working Harm My Baby?  In most cases, working throughout your pregnancy is not harmful at all. There may be concerns if the job involves dangerous machinery or chemicals, lifting, or standing for very long periods of time. Talk to your health care provider and employer about your particular job and pregnancy.  Why Can t I Change the Cat Litter Box?  Cats carry a disease called toxoplasmosis. In adult humans, it shows up as a mild infection of the blood and organs. If you are infected during pregnancy, the baby s brain and eyes could be damaged. To be safe, have someone else change the litter. If you must handle it, wear a paper mask over your nose and mouth. Also, wear gloves and wash your hands afterward.  Which Medications Are Safe?  No prescription or over-the-counter drug is safe for everyone all of the time. But sometimes medications are needed. Be sure your health care provider knows you are pregnant. Then use only the medications he or she advises you to take. Please refer to the below resources for further information and discuss concern and questions with your midwife.  Is It True That I Can Overheat My Baby?  Yes. To avoid making your baby too warm:    Don t sit in a jacuzzi. A long, warm bath is fine, but not in water over 100 F.    Exercise less intensely if you feel fatigued. Base your workout on how you feel, not your heart rate. Heart rates aren t a good way to measure effort during pregnancy.  Can I Lift and Carry Safely?  Yes, if your health care provider doesn t tell you otherwise. Learn to lift and carry safely to avoid injury and reduce back pain during pregnancy. To protect your back:    Bend at the knees to bring the load nearer.    Get a good  . Test the weight of the load.    Tighten your abdomen. Exhale as you lift.    Lift with your legs, not with your back.    Carry the load close to your body.    Hold the load so you can see where you are going.  What If I Get Sick?  Most women get sick at least once during pregnancy. Talk with your health care provider if you do. Most likely it will not affect your pregnancy. Get plenty of rest and fluids, and eat what you can. Talk to your health care provider before taking any medications.        HEALTHY PREGNANCY CARE: 10-14 WEEKS PREGNANT     By weeks 10 to 14 of your pregnancy, the placenta has formed inside your uterus. It may be possible to hear your baby's heartbeat with a doppler ultrasound device. Your baby's eyes can and do move. The arms and legs can bend.    GENETIC SCREENING OPTIONS AT [facility]                All testing is optional. We don t recommend or discourage any test; it is totally up to you and your partner. Some couples wish to know their risk of having a baby with a genetic defect and others do not. We will support your decision. Abnormal results may lead to a discussion of options for further testing.    Accurate dating of your pregnancy is important for all testing so an ultrasound may be done prior to referral or testing.    No testing provides certainty; there are false positives and negatives associated with all testing, some more than others.    Most genetic testing is non-invasive (requires only a blood sample and sometimes an ultrasound or both).    It is always wise to check with your insurance carrier before proceeding.    Some testing can be done at our lab and some require a referral.    If you decide to do no testing, the 20 week ultrasound scan, which is a routine or standard ultrasound, has some ability to detect abnormalities in the baby, and identifies obstetric problems.    If you are over 35, you will have the option of a Level II ultrasound, which is a more  detailed and targeting scan, that helps detect fetal anomalies as well as obstetric problems.      If you have a more complex family history of chromosomal abnormalities, a referral to a genetic counselor and/or a Maternal Fetal Medicine specialist, to help identify available tests, may be recommended.    TYPES OF GENETIC TESTING AVAILABLE INCLUDE:    Carrier Screening/Testing for Genetic Conditions    There are many inherited conditions for which testing or carrier testing is available. Carrier screening can test for conditions like Cystic Fibrosis, Thalassemia, Conner Sachs, Sickle Cell, Hemophilia, Muscular Dystrophy, Peng s disease and many others.     Cystic Fibrosis (CF) affects both males and females and people from all racial and ethnic groups. However, the disease is most common among Caucasians of Northern  descent. CF is also common among Latinos and American Indians. The disease is less common among  Americans and  Americans. More than 10 million Americans are carriers of a faulty CF gene and many of them don't know that they are CF carriers. One or both parents can be tested any time before or during pregnancy.    Talk to your care provider about your family history and whether you should be screened. A referral to a genetic counselor at [facility] or [facility] can be made by your care provider at any time.    This is also tested for in the  Metabolic Screen that your infant receives 24 hours after birth.     Fetal DNA cell Testing: [place] or Innatal (Non-Invasive Prenatal Testing)    At 10 wk or greater, a blood sample can be drawn here at clinic or at any of our referral offices. It will provide highly accurate results with low false positive rates for trisomy 18, trisomy 21 (Down Syndrome), and trisomy 13 (> 99% trisomy detection rate at a false positive rate of <0.1%). Gender identification can also be obtained if desired (98% accuracy).  Can also  detect some sex-linked chromosomal abnormalities (80-90% accuracy).  This is often done if one of the other screening tests is abnormal.        1st Trimester Screening:     Everyone has an age related risk of having a baby with a genetic abnormality. This testing provides a risk profile which is better than assigning risk based solely on your age.    Refines your risk of having a baby with a chromosomal abnormality such as Trisomy 21 & 18.    This test with detect a fetus with one of these disorders about 85% of the time.    A thickened nuchal fold can also be associated with cardiac defects.    This test requires a blood collection combined with ultrasound to obtain a measurement of fluid at back of baby s neck (nuchal translucency).    False positive results occur approximately 5% of cases.    An additional blood sample may be necessary in order to calculate your risk of having a baby with a neural tube defect (e.g. spina bifida).  This is called the AFP test (alpha fetal protein).  An ultrasound should be able to  any spinal defect as well.    Follow-up of an abnormal test may include a more extensive ultrasound study and you may be offered an amniocentesis (a small sample of amniotic fluid is withdrawn and studied) for a definitive diagnosis    Quad Screen (4-marker screen)    Between 15 and 21 weeks, a sample of blood can be drawn at our lab to assess your risk of having a baby with Down Syndrome, Trisomy 18 and neural tube defects. Such testing is able to detect these conditions in 80% of cases and the false-positive rate is approximately 5%.     Follow-up of an abnormal test may include a more extensive ultrasound study and you may be offered an amniocentesis (a small sample of amniotic fluid is withdrawn and studied) for a definitive diagnosis      Referrals opportunities include:    [facility],  [facility], [facility]  o Offers nuchal translucency ultrasound; does not  offer genetic counseling.    [place]  [facility] and [facility]):   o Approximately an hour long visit includes 30 min with genetic counselor who discusses all testing available and which ones might be beneficial to you based on age, personal and family history.    o Blood will be drawn and the nuchal translucency ultrasound will be discussed and performed if desired. The Free Fetal DNA testing ([place]/Verifi) can be drawn also.  o Targeted or detailed Level II ultrasounds are also available with these perinatology groups.        Breastfeeding: a Healthy Option for You and Your Baby  Consider breastfeeding for the healthiest way to feed your baby. Ask your midwife or physician for more information.     The choice of how you will feed your baby is important.  Before your baby s birth, you ll want to learn about the benefits of breastfeeding.  [facility] have been designated Baby Friendly; an initiative that was created by the World Health Organization and UNICEF.  This helps give you and your baby the best start in feeding their baby.    Why should I breastfeed my baby?    Babies are less likely to develop childhood obesity or diabetes    Babies are less likely to suffer from recurrent ear infections    Babies are less likely to be hospitalized for respiratory conditions    Breast milk is rich in nutrients and antibodies-it is easy to digest    How does it benefit me?    Lowers the risk for diabetes, breast and ovarian cancer and postpartum depression    Moms can lose  baby weight  more quickly    Cost savings - formula can cost well over $1,500 per year    Convenient - no bottles and nipples to sterilize, no measuring and mixing formula    The physical contact with breastfeeding can make babies feel secure, warm and comforted     What about formula?  While you and your baby are staying with us at [facility], we will support whatever feeding choice you make for your baby.     Some important considerations:      The American Academy of Pediatrics, the World Health Organization, and many more organizations recommend exclusive breastfeeding for 6 months and continued breastfeeding while adding other foods for the first 1-2 years.      Any amount of breastmilk has benefits to both baby and mother.    Giving formula in replacement of breastfeeding can affect mother s milk supply.  If formula is needed, hospital staff will work with you on a plan to help develop your milk supply.    Formula alters the natural growth of good bacteria in the  stomach.     Research has found that first time mothers who offer formula in the hospital have a shorter duration of breastfeeding.    How can I start to prepare?     Start by having a conversation with your medical provider.     Talk with your partner, family and friends.     Attend a prenatal class that includes breastfeeding preparation. Birth and breastfeeding classes are offered by [employer]. Visit Un[employer]That for class information.     After your baby s birth, hospital staff and lactation consultants will help you and your baby get off to a great start with breastfeeding.    As your center of gravity and weight changes, use good body mechanics when changing positions and lifting. For example, use a straight back and your legs for support when lifting instead of bending over. Maintain good posture to prevent straining your muscles. Now is a good time to continue or restart your exercise program. Walking 30-60 minutes daily is an excellent way to keep fit. Yoga and swimming also offer many benefits.    The nausea and fatigue of early pregnancy have usually started to let up, so this is a good time to focus on nutrition. Consider attending a nutrition class. A healthy diet includes about 60 grams of protein each day (3-4 servings of dairy, 2-3 servings of meat/fish/poultry/nuts), 4-6 servings of whole grain foods, and 5-6  servings of fruits and vegetables. Remember to drink 6-8 glasses of water daily.    Watch for warning signs, such as     vaginal bleeding    fluid leaking from your vagina    severe abdominal pain    nausea and vomiting more than 4-5 times a day, or if you are unable to keep anything down    fever more than 100.4 degrees F.       RESOURCES   You can refer to the Starting Out Right book or find it online at http://www.[email or web address]/images/stories/[email or web address]-Starting-Out-Right.pdf or http://www.[facility].org/images/stories/flipbooks/[facility]-starting-out-right/[facility]-starting-out-right.html#p=8    You can sign up for a weekly parenting e-mail that gives support, tips and advice from health care professionals that starts with pregnancy and continues through the toddler years. To register, go to [email or web address] at any time during your pregnancy.    Breastfeeding:    OUTPATIENT LACTATION RESOURCES     -Schedule an appointment with a [facility] Nurse [facility] NAT who is also a Lactation Consultant by calling [phone]. We see women for breastfeeding visits at [facility] and [facility].     -Baby Café    Pregnant and interested in breastfeeding?  Need answers to breastfeeding questions?  Want to help breastfeeding moms?  Already breastfeeding and want to meet other moms?    Join us at the Baby Café!    Baby Cafe is a free, drop-in service offering breast-feeding support for pregnant women, breast-feeding mothers and their families.  Come share tips and socialize with other mothers.  Babies and siblings are welcome (no childcare available).    Starting April 2018, Baby Café will be at 4 locations.  Please see below for the Baby Café closest to you! Hmong, Vietnamese, and [place] Islander which is may be available at some sites.      [facility]  [address] [place] [address]  1st Wednesday: 10am-12pm    [address] [place] [address]  3rd Wednesday 4-6pm    [facility]  [address] [place] [address]  4th Wednesday 10am-12:30pm    [facility]  [address] [place] [address]  4th Wednesdays: 4-6pm    -Attend a baby weigh in at [facility].  Lactation consultants are available to answer questions  [place]: Tuesdays 1:00 - 2:00  [facility], [facility]: Mondays 1:00 - 2:00   [email or web address]    -Attend one of the New Mama groups at [facility] in [facility].  [facility] also offers one-on-one in home and in office lactation consults.   [email or web address]    -Attend a [facility] meeting.  Multiple groups in several locations throughout the [facility]. The meetings are no-cost and always informative breastfeeding education session through [employer] La Leche League  Www.londas.org/     Held at [facility] the second Thursday of each month at 7pm    Childbirth and Parenting Education:   [facility]: http://Cloudary/   [phone]  [place]: (education, yoga & wellness) www.TabTale  [facility]: www.CorMedix   Childbirth collective: (Parent topic nights)  www.childbirthcollective.org/  Hypnobabies:  www.hypnobabiestwincities.com/  Hypnobirthing:  Http://hypnobirthing.com/  The Birth Hour: https://thebirthhour.[employer]/online-childbirth-class/    APPS and Podcasts:   [place]  Glow Nurture  The Birth Hour (for birth stories)   Birthful   Expectful   The Longest Shortest Time  PregnancyPodcast Haylee Dodge  Book Recommendations:   Dilia Ju's Birthing From Within--first few chapters include a new-age tone, you may prefer to skip it and keep going, because there is good stuff later.  This book recommendation covers emotional preparation, but does cover coping with pain, and use of both pharmacological and nonpharmacological methods.    Dr. Dowd' The Pregnancy Book and The Birth Book--the pregnancy book goes  "month-by month    Womanly Art of Breastfeeding by La Leche [employer]   Bestfeeding by Marivel Steele--great pictures    Mothering Your Nursing Toddler, by Jackie Chun.   Addresses dealing with so many of the challenging behaviors of a nursing toddler.  How Weaning Happens, by La Leche League.  Discusses weaning at all ages, from medically necessary weaning of an infant, all the way up to age 5 (or older), with why/why not, and strategies.  Very empowering book both for deciding to wean and deciding not to.    American College of Nurse-Midwives (ACNM) http://www.midwife.org/; look at the informational handouts at http://www.midwife.org/Share-With-Women     www.mymidwife.org    Mother to Baby (Medication and Herbal guidance in pregnancy): http://www.mothertobaby.org  Toll-Free Hotline: 664.691.5095  LactMed (Medication use while breastfeeding): http://toxnet.nlm.nih.gov/newtoxnet/lactmed.htm    Women's Health.gov:  http://www.womenshealth.gov/a-z-topics/index.html    American pregnancy association - http://americanpregnancy.org    Centering Pregnancy (group prenatal care option): http://centeringhealthcare.org    Information about doulas:  Childbirth collective: http://www.childbirthcollective.org/  [employer] [place] (SHELLIE):  www.shellie.org  [facility]  project: http://twincitiesdoulaproject.com/     Early Childhood and Family Education (ECFE):  ECFE offers parents hands-on learning experiences that will nourish a lifetime of teachable moments.  http://ecfe.info/ecfe-home/    [employer] www.[employer].OmniLytics     FDA - Nutrition  www.mypyramid.gov  Under \"For Consumers,\" click on \"pregnant and breastfeeding women.\"      Centers for Disease Control and Prevention (CDC) - Vaccines : http://www.cdc.gov/vaccines/       When researching information on the web, question the validity of websites.  The domains .gov, .edu and.org tend to be more reliable information.  If there are a lot of " advertisements, be cautious of the information provided. Stay away from blogs and chat rooms please!      Nutrition & supplements:     Prenatal vitamin (those with 600-1000 mcg folic acid and 27 mg of iron are enough).  Take with food or Juice     4-5 servings of dairy or other calcium rich foods (fish, leafy greens, soy) per day - if not, take 500-1000 mg additional calcium (Tums, pills, chews). Take with dairy     Vitamin D3 9008-6594 IU geltab daily.  Take with fattiest meal.  Look for fortified foods also (Dairy, Juice)     2-3 (4) oz servings of fish, seafood, nuts (walnuts & almonds), oils, avocado per week - if not, take Omega 3 Fatty acids: DHA & CHASITY 6871-9822 mg per day.  Other names: cod liver oil, fish oil. Take with fattiest meal.  Some prenatals have DHA, but typically not a sufficient dose.    Fish: Do not eat shark, swordfish, pipo mackerel, or tilefish when you are pregnant or breastfeeding.  They contain high levels of mercury.  Limit white (albacore) tuna to no more than 6 ounces per week. Http://www.fda.gov/downloads/ForConsumers/ConsumerUpdates/WIO369128.pdf         Touring the Maternity Care Center  At this time we are offering a virtual tour of the Maternity Care Centers at both [facility] and [facility]:   [facility]: [email or web address]  [facility]:   [email or web address]  [email or web address]/Maternity-Care-[email or web address]/#[email or web address]_tour  When in person tours become available, registrations is required. To schedule a tour at either [facility] or [facility], please do so online using the following links:  [facility] - [email or web address]&[email or web address]  [facility] [email or web address]&[email or web address]      You are invited to  Meet the [facility] Nurse Midwives [facility]    A way to tour the hospital Labor and Delivery unit and meet the midwives in our group was postponed at the start of hospital restrictions following COVID-19. We will resume these when able and virtual options may be available in the future.     Please call [phone] for ongoing updates.

## 2021-06-13 NOTE — PROGRESS NOTES
Arlyn presents to [facility] clinic by herself for her routine 16w6d PNV. Feeling well. No movement felt yet. Looked at wt gain chart--doing ok. Ordered 20 wk FAS.  AFP declined. All other genetic testing declined other than 20 wk US. Will consider level II for AMA, but for now declines. CNM details added to problem list. Pt and  are working from home now.    COVID-19 discussed. Pt is washing her hands frequently, cleaning surfaces at home and limiting social contact to avoid exposure from coronavirus. She denies fever, cough, shortness of breath, any contact with anyone with coronavirus-like symptoms, travel to high risk areas. Reviewed recommendation for daily iron supplementation for all pregnant women at this time.  We discussed current recommendations for prenatal appointments with some inperson visits and telephone visits when appropriate. Reviewed changes to hospital policies regarding COVID-19 and the maternity unit. Reviewed how to reach CNM with non-urgent and urgent concerns between visits.

## 2021-06-14 NOTE — PATIENT INSTRUCTIONS - HE
[facility] Contact information:  Appointment line and to get a hold of CNM in clinic Monday-Friday 8 am - 5 pm:  [phone].  There are some clinics with early start times (1st appointment 7:40 am) and others with evening hours (last appointment 6:20 pm).  Most are typically open from 8 am to 5 pm.    CNM on call answering service: [phone].  Specify your hospital of choice and leave a brief message for CNM;  will then page CNM who is on call at your specified hospital and you should receive a call back with 15 minutes.  Be sure that your ringer is audible and that you can accept blocked calls so that we can get back in touch with you! This number should be reserved for urgent needs if during the day, before 8 am, after 5 pm, weekends, holidays.    Contact the on-call CNM with warning signs, such as:    vaginal bleeding     Vaginal discharge and itching or pain and burning during urination    Leg/calf pain or swelling on one side    severe abdominal pain    nausea and vomiting more than 4-5 times a day, or if you are unable to keep anything down    fever more than 100.4 degrees F.          You are invited to  Meet the [facility]    Virtual:   https://www.[place].org/classes-and-events/meet-the-midwives-from-[facility]    A way to tour the hospital Labor and Delivery unit and meet the midwives in our group was postponed at the start of hospital restrictions following COVID-19. We will resume these when able.    Please call [phone] for ongoing updates.      UNDERSTANDING  LABOR    Going into labor before your 37th week of pregnancy is called  labor.  labor can cause your baby to be born too soon. This can lead to a number of health problems that may affect your baby. From 28-35 weeks, Patients are advised to be evaluated at [facility] since they have a  Intensive  Care Unit (NICU).  -Before labor, the cervix is thick and closed.  -In  labor, the cervix begins to efface (thin) and dilate (open) over a short period of time    Symptoms of  Labor  If you believe you re having  labor, contact the midwives right away. But contractions alone don t mean you re in  labor. What matters more are changes in your cervix (the lower end of the uterus). Symptoms of  labor include:    five or more contractions per hour    Strong & frequent contractions    Constant menstrual-like cramping    Low-back pain    Mucous or bloody vaginal discharge    Bleeding or spotting in the second or third trimester    Evaluating  Labor  Your midwife will try to find out whether you re in  labor or whether you re just having contractions.She may watch you for a few hours. The following tests may be done:    Pelvic exam to see if your cervix has effaced (thinned) and dilated (opened)    Uterine activity monitoring to detect contractions    Fetal monitoring to check the health of your baby    Ultrasound to check your baby s size and position    Caring for Yourself At Home  If you have contractions  but your cervix is still thick and closed, the midwife may ask you to do the following at home:    Drink plenty of water.    Do fewer activities.    Rest in bed on your side.    Avoid intercourse and nipple stimulation.    When to Call Your Midwife    Five or more contractions per hour    Bag of water breaks    Bleeding or spotting     If You Need Hospital Care   labor often requires that you have hospital care and complete bed rest. You may have an IV (intravenous) line to get fluids. And you may be given pills or an injection to help prevent contractions. Finally, you may receive medication (corticosteroids) that helps your baby s lungs mature more quickly.    Are You At Risk?  Any pregnant woman can have  labor. It may start for no reason. But  these risk factors can increase your chances:    Past  labor or past early birth    Smoking and drug or alcohol use during pregnancy    Multiple fetuses (twins or more)    Problems with the shape of the uterus    Bleeding during the pregnancy    The Dangers of  Birth  A baby born too soon may have health problems. This is because the baby didn t have enough time to mature. The baby then is at risk of:    Not breastfeeding well    Having immature lungs    Bleeding in the brain    Dying    Reaching Term  Your goal is to get as close to term as you can before giving birth. The closer you get to term, the higher your chance of having a healthy baby. Work with your healthcare provider. Together, you can take steps that may keep you from giving birth too early.        Testing for Gestational Diabetes in Pregnancy  [facility] are committed to providing safe care during your pregnancy. We follow the recommendations of the American Diabetes Association and the American College of Obstetricians and Gynecologists to test all pregnant women for gestational diabetes. Testing early in pregnancy (if you have risk factors) and testing all women between 26-28 weeks follows local and national guidelines for care during pregnancy. Clients who feel that they cannot consent to such testing, may choose to transfer their care to our consultant obstetricians.  What is the test?  Eat normally on the day of the test; a diet rich in protein, whole grains, and vegetables would be best. Avoid simple sugars, white flour products and juices prior to testing.  You will be asked to drink a 10 oz glucose beverage (50 gm, about the same as a can of root beer).  The product is not carbonated as it has to be consumed within 5 minutes. During the next hour, you are seen for a prenatal visit, but are asked to limit your activity around the clinic. Feel free to bring a book or your computer.   Any level less than 140 for this   glucose challenge test  is considered normal. If measured at 140 to 185, the diagnostic test, a  3 hour glucose tolerance test  will be conducted. If 2 or more readings are abnormal, the diagnosis of gestational diabetes is confirmed and a referral to a diabetes educator will be made. If the level is 185 or above, this confirms the diagnosis and a referral will be made without administering the 3 hour test.  A fasting blood sugar may be performed sometime in the first trimester if you have risk factors for the development of gestational diabetes such as a previous diagnosis or birth of a large baby or a close family relative with diabetes.  What is gestational diabetes?  Your body converts what you eat into glucose. In response to rising blood sugar levels it secretes insulin in order to be able to utilize that glucose. During pregnancy as the placenta grows and matures, it secretes hormones that are necessary to assure baby s growth and development, however, they also reduce the action of insulin in the mother. In some cases too much insulin is blocked (this is called  insulin resistance ) and blood sugar in the mother rises above a normal level. Pregnant women who have never had diabetes before but who have high blood sugar (glucose) levels during pregnancy are said to have gestational diabetes. Gestational diabetes affects about 4-7% of all pregnancies.  What if I have gestational diabetes?  If either of the tests for gestational diabetes show that you have this condition, a referral will be made to visit with the diabetes educator. The educator will help you to make wise food choices, count carbohydrates, monitor your blood sugar and record your levels in a journal. We ask that you bring this diary with you at each prenatal visit. You may meet with the educator several times during the remainder of your pregnancy.  How gestational diabetes can affect your baby  Some mothers may wonder why testing for GDM is  delayed until the early part of the 3rd trimester for most women. Gestational diabetes has an impact on the baby at the time of rapid body growth. When the mother s blood has elevated sugar levels, extra glucose crosses the placenta causing the baby to have excess weight gain ( macrosomia ). Some babies are too big to be born vaginally so their mother must have  births. Babies of mothers with uncontrolled gestational diabetes may have difficult births that can cause trauma to the mother and in rare circumstances, babies may suffer fractures or oxygen deprivation during birth. They may have difficulty maintaining their own blood sugar after birth and they may also have trouble adapting to life outside. Recent research indicates that babies of mothers with uncontrolled or undiagnosed gestational diabetes are at risk for obesity and type 2 diabetes. Women who develop gestational diabetes are more likely to develop type 2 diabetes within 15 years after their pregnancy.  Additional Information  The American College of Nurse Midwives (ACNM) provides an information sheet describing diabetes screening in pregnancy: http://www.womensdocs.com/jeff/pdf/Second_Trimester/Gestational_Diabetes.pdf    You can visit the American Diabetes Association website http://www.diabetes.org for additional information and to purchase their book,  Gestational Diabetes: What to Expect .    A brochure from the American College of Obstetrics and Gynecology is available at:   http://www.acog.org/~/media/For%20Patients/fet396.pdf?dmc=1&vd=[email or web address]  Testing for gestational diabetes is a critical part of your prenatal journey. Thank you for taking good care of yourself and your baby.    HEALTHY PREGNANCY CARE: 22-26 WEEKS PREGNANT    You are finishing your second trimester. Your baby is developing rapidly. At this stage, babies have a sense of balance, can respond to touch, and are recognizing parent voices.  Your baby will be moving  around more now.  You may notice Gurvinder-Mari contractions now, which are painless and prepare the uterus for the delivery.    Nutrition: During this time, you may find that your nausea and fatigue are gone. Overall, you may feel better and have more energy than you did in your first trimester. Be sure you are getting enough calcium and iron in your diet. Your prenatal vitamins cannot supply all of the nutrients you need, so continue to eat 3-4 servings of dairy foods and 2-3 servings of meat/fish/poultry/nuts every day. Foods high in iron include: red meats, eggs, dark green vegetables, dark yellow vegetables, nuts, kidney beans and chickpeas. Some cereals are fortified with iron, so look at the food labels for 100% of the daily requirement for iron.     Discuss your work situation with your midwife or physician as needed. If you stand for long periods of time, you may need to make changes and take breaks.    [place] for childbirth and parenting classes, including an infant CPR class. Breastfeeding classes are recommended too.    Childbirth and Parenting Education:   [facility]: http://SuperbacHarbor-UCLA Medical CenterTimeliner/   (926) 089-BABY  [place]: (education, yoga & wellness) www."[employer]  Enlightened [employer]mn[employer]: www.Sparkfly   Childbirth collective: (Parent topic nights)  www.childbirthcollective.org/  Hypnobabies:  www.hypnobabiestwincities.com/  Hypnobirthing:  Http://hypnobirthing.com/  The Birth Hour: https://theClipabout.[employer]/online-childbirth-class/    APPS and Podcasts:   Ovia  Glow Nurture  The Birth Hour (for birth stories)   Birthful   Expectful   The Longest Shortest Time  PregnancyPodcast Haylee Dodge    Book Recommendations:   Dilia Ju's Birthing From Within--first few chapters include a new-age tone, you may prefer to skip it and keep going, because there is good stuff later.  This book recommendation covers emotional preparation, but does cover coping with pain, and use of both  "pharmacological and nonpharmacological methods.    Dr. Dowd' The Pregnancy Book and The Birth Book--the pregnancy book goes month-by month    Womanly Art of Breastfeeding by La Leche League International   Bestfeeding by Marivel Steele--great pictures    Mothering Your Nursing Toddler, by Jackie Chun.   Addresses dealing with so many of the challenging behaviors of a nursing toddler.  How Weaning Happens, by La Leche League.  Discusses weaning at all ages, from medically necessary weaning of an infant, all the way up to age 5 (or older), with why/why not, and strategies.  Very empowering book both for deciding to wean and deciding not to.    American College of Nurse-Midwives (ACNM) http://www.midwife.org/; look at the informational handouts at http://www.midwife.org/Share-With-Women     www.mymidwife.org    Mother to Baby (Medication and Herbal guidance in pregnancy): http://www.mothertobaby.org  Toll-Free Hotline: 472.736.2113  LactMed (Medication use while breastfeeding): http://toxnet.nlm.nih.gov/newtoxnet/lactmed.htm    Women's Health.gov:  http://www.womenshealth.gov/a-z-topics/index.html    American pregnancy association - http://americanpregnancy.org    Centering Pregnancy (group prenatal care option): http://centeringhealthcare.org    Information about doulas:  Childbirth collective: http://www.childbirthcollective.org/  Doulas of [place] (SHELLIE):  www.shellie.org  [facility]  project: http://twincitiesdoulaproject.com/     Early Childhood and Family Education (ECFE):  ECFE offers parents hands-on learning experiences that will nourish a lifetime of teachable moments.  http://ecfe.info/ecfe-home/    March of Dimes www.Eviti.GreenPal - Nutrition  www.mypyramid.gov  Under \"For Consumers,\" click on \"pregnant and breastfeeding women.\"      Centers for Disease Control and Prevention (CDC) - Vaccines : http://www.cdc.gov/vaccines/       When researching information on the web, question " the validity of websites.  The AxisRooms .gov, .edu and.org tend to be more reliable information.  If there are a lot of advertisements, be cautious of the information provided. Stay away from blogs and chat rooms please!    How can you care for yourself at home?   You can refer to the Starting Out Right book or find it online at http://www.healtheast.org/images/stories/maternity/[facility]-Starting-Out-Right.pdf or http://www.healtheast.org/images/stories/flipbooks/[facility]-starting-out-right/[facility]-starting-out-right.html#p=8     You can sign up for a weekly parenting e-mail that gives support, tips and advice from health care professionals that starts with pregnancy and continues through the toddler years. To register, go to www.healthappweevr.org/baby at any time during your pregnancy.      Baby Feeding in the Hospital: Information, Support and Resources    As you prepare for the birth of your child, you will want to consider options for feeding your baby including breast-feeding and/or baby formula. The American Academy of Pediatrics recommends exclusive breast-feeding for the first six months (although any amount of breast-feeding is beneficial).  However, we also understand that breast-feeding is a personal choice and not for everyone. Whether or not you choose to breast-feed, your decision will be respected by our staff.    There are numerous benefits of breast-feeding; here are a few to consider:    Provides antibodies to protect your baby from infections and diseases    The cost: formula can cost over $1,500 per year    Convenience, no warming up or sterilizing bottles and supplies    The physical contact with breastfeeding can make babies feel secure, warm and comforted    What ever my feeding choice, what can I expect after I deliver my baby?    Your baby will usually be placed skin-to-skin immediately following birth. The skin to skin contact between you and your baby will be a special and memorable  time. The bonding and attachment comforts your baby and has a positive effect on baby s brain development.     Having your baby  room in  with you also helps you start to learn your baby s body rhythms and sleep cycle.      You will also begin to learn your baby s cues (signals) that he or she is ready to feed.    When do I start to feed my baby?  As soon as possible after your baby s birth, you will be encouraged to begin feeding.  In the first couple of weeks, your baby will eat often.  Breastfeeding babies usually eat at least 8 times in 24 hours.  Babies fed formula usually eat at least 7 times in 24 hours.      Breast-feeding tips:    Get comfortable and use pillows for support.    Have your baby at the level of your breast, facing you,  tummy to tummy .      Touch your nipple to your baby s lips so you baby s mouth opens wide (rooting reflex).  Aim the nipple toward the roof of your baby s mouth. When your baby opens his or her mouth, pull your baby toward your breast to help your baby  latch on  to your nipple and much of the areola area.    Hand expressing your breast milk can assist with latching your baby to your breast, if needed.    Ask for help, breastfeeding may seem awkward or uncomfortable at first, this is normal. There are numerous resources available at [facility] and beyond.     If your goal is to exclusively breastfeed, avoid using any formula or artificial nipples (including bottles and pacifiers) while you are your baby are learning to breastfeed unless there is a medical reason.       Mixing breastfeeding and formula can interfere with how you begin building your milk supply.  It can impact how you and your baby  learn  to breastfeeding together and alter the natural growth of  good  bacteria in your baby s stomach.    Delay a pacifier or a bottle in the first few weeks until breastfeeding is well established. This is often around 3 weeks of age.    Ask your nurse to show  you how to hand express.   Breast milk can be kept in the refrigerator or freezer for later use.        Touring the Maternity Care Center  At this time we are offering a virtual tour of the Maternity Care Centers at both [facility] and [facility]:   [facility]: https://www.[facility].org/Locations/[facility]/Maternity-Care-Center  [place]:   https://Omada/overarching-care/the-birthplace/tours  https://www.[facility].org/Locations/[facility]/MaternityCare-Center/#virtual_tour  When in person tours become available, registrations is required. To schedule a tour at either [place] or [facility], please do so online using the following links:  [facility] - https://www.Image Insight/registerlist.asp?s=[email or web address]&m=303&vs=5&p=2&btlve=586&ps=1&group=37&it=1&pdx=791  [place] - https://www.Image Insight/registerlist.asp?s=6&m=303&vs=5&p=2&buzbw=577&ps=1&group=38&it=1&frf=914    Hospital and Clinic  Resources:  -Schedule an appointment with a [facility]   CNM who is also a Lactation Consultant by calling [phone]     -Schedule a clinic appointment with a [facility] CNM with dedicated clinic hours for breastfeeding assistance by calling [phone]. Breastfeeding clinic visits are at [facility] on Mondays, [facility] on Tuesdays and [facility] on Thursdays.     Baby Café    Pregnant and interested in breastfeeding?  Need answers to breastfeeding questions?  Want to help breastfeeding moms?  Already breastfeeding and want to meet other moms?    Join us at the Baby Café!    Baby Cafe is a free, drop-in service offering breast-feeding support for pregnant women, breast-feeding mothers and their families.  Come share tips and socialize with other mothers.  Babies and siblings are welcome (no childcare available).    Starting April 2018, Baby Café will be at 4 locations.  Please  see below for the Baby Café closest to you!      [facility]  [address] [place] [address]  1st Wednesday: 10am-12pm    [facility]  [address] [place] [address]  3rd Wednesday 4-6pm    [facility]  [address] [place] [address]  4th Wednesday 10am-12:30pm    [facility]  [address] [place] [address]  4th Wednesdays: 4-6pm      Hmong, Sao Tomean, and Tongan which is may be available at some sites.    For more information, please contact: [email or web address] or [phone]    -Attend a baby weigh in at [facility].  Lactation consultants are available to answer questions  [place]: Tuesdays 1:00 - 2:00  [facility]: Mondays 1:00 - 2:00  www.[facility] HospitalBreathe Technologies    -Attend one of the New Mama groups at [facility] in [facility].  [facility] also offers one-on-one in home and in office lactation consults.   www.[facility].Byliner    -Attend a [place] meeting.  Multiple groups in several locations throughout the [facility]. The meetings are no-cost and always informative breastfeeding education session through [employer]atal La Leche League  Www.solis.org/  Medication use while breastfeeding: http://toxnet.nlm.nih.gov/newtoxnet/lactmed.htm     Online Resources:    [facility]altheast.org/baby sign up for free online weekly e-mail    [facility]altheast.org/maternity    Breastfeedingmadesimple.com    li.org (La Leche League)    Normalfed.com    Womenshealth.gov/breastfeeding    Workandpump.com    Breast-feeding Supplies & Pumps:  Talk to your insurance provider or WIC (Women, Infants and Children) to learn more about options available to you. Recent health insurance changes may include additional coverage for supplies and pumps.    Public Health:  Women, Infants and Children Nutrition program (WIC): provides breast-feeding support and education in addition to formal feeding moms.  "942-BGD-1718 or http://www.health.[facility].us/divs/fh/wic    Family Health Home Visiting: Public Health Nurse home visits are available. Talk to your provider to see if you qualify. Most [facility] have a program available.    Additional Resources:  La Leche Roverto is an international, nonprofit, nonsectarian organization offering information, education, and support to mothers who want to breast-feed their babies. Local groups offer phone help and monthly meetings. Visit [employer]eroS[employer].FrugalMechanic or RollCall (roll.to).org and us the  Find local support  drop down menu or click on the  Resources  tab.    [place] Breastfeeding Resources: 2-660-488-BABY (1063) toll free    National Breastfeeding Help Line trained breastfeeding peer counselors can help answer common breast-feeding questions by phone. Monday-Friday: English/Namibian  0-503- 994-9662 toll free, 1-852.720.8262 (TTY)    [facility] Connection: 651-326-CARE (3847)      Virtual Breastfeeding Support:    During this time of isolation, breastfeeding families need even more community!  Here are some area organizations offering virtual support groups for breastfeeding:      Lat Cafe Support Group, Tuesdays at 10:30 am   Run by GARRISON Cordoba of [employer] [facility]act[facility]   Go to [employer] [facility] [employer] Facebook page and click on \"events\" for link   https://www.[email or web address]/    [facility] Milk Hour, Thursdays at 2:30 pm    Run by GARRISON Doty   Go to [facility] FB page and send message to get link   https://www.Paperton.com/healthfoundations/    [facility] holding virtual meetings the first Tuesday of each month, 8-9 pm, and the   Third Saturday, 10 - 11 am.  Go to La Leche [facility] [place] FB page; message to get link [email or web address]://www.Paperton.com/MICHELLELofGLamin/?hc_location=[facility]    [place] offers a " Lactation Lounge every Friday 12pm - 1pm, run by Luis F Lloyd Leader   Sign up via link at [employer]/cbe-lactation   https://www.[employer]/cbe-lactation    [facility] is offering virtual support groups every Monday, 10:30 am - 12 pm, run by nurse GARRISON   Https://www."[employer]".com/events/[email or web address]/    Prenatal Breastfeeding Classes:        [employer] is offering virtual breastfeeding and  care classes:  https://www.[employer]/education-workshops    BirthEd childbirth and breastfeeding education offering virtual prenatal breastfeeding classes  https://www.birthedmn.com/workshops

## 2021-06-14 NOTE — TELEPHONE ENCOUNTER
PHONE NOTE: called pt with US results.  Patient is 36 years old.  Would like referral to maternal-fetal medicine for follow-up ultrasound.  Heart was not visualized adequately on regular ultrasound.  Will place referral to [facility].  All questions answered

## 2021-06-14 NOTE — PROGRESS NOTES
Here alone today. Feeling well, notes daily movement. Level II follow up US scheduled for 1/20/2020 at [facility]. Offers no concerns today. Taking baby ASA daily since 13 weeks. Offered resources for refresher CBE and declined. Advised on warning s/sx and reasons to call. RTC 4 weeks, offered may be virtual if desires.

## 2021-06-15 ENCOUNTER — OFFICE VISIT - HEALTHEAST (OUTPATIENT)
Dept: FAMILY MEDICINE | Facility: CLINIC | Age: 36
End: 2021-06-15

## 2021-06-15 DIAGNOSIS — R06.00 DYSPNEA, UNSPECIFIED TYPE: ICD-10-CM

## 2021-06-15 DIAGNOSIS — D50.9 IRON DEFICIENCY ANEMIA, UNSPECIFIED IRON DEFICIENCY ANEMIA TYPE: ICD-10-CM

## 2021-06-15 DIAGNOSIS — R03.0 ELEVATED BLOOD PRESSURE READING WITHOUT DIAGNOSIS OF HYPERTENSION: ICD-10-CM

## 2021-06-15 LAB
ANION GAP SERPL CALCULATED.3IONS-SCNC: 13 MMOL/L (ref 5–18)
BASOPHILS # BLD AUTO: 0.1 THOU/UL (ref 0–0.2)
BASOPHILS NFR BLD AUTO: 1 % (ref 0–2)
BUN SERPL-MCNC: 13 MG/DL (ref 8–22)
CALCIUM SERPL-MCNC: 8.2 MG/DL (ref 8.5–10.5)
CHLORIDE BLD-SCNC: 107 MMOL/L (ref 98–107)
CO2 SERPL-SCNC: 21 MMOL/L (ref 22–31)
CREAT SERPL-MCNC: 0.76 MG/DL (ref 0.6–1.1)
EOSINOPHIL # BLD AUTO: 0.3 THOU/UL (ref 0–0.4)
EOSINOPHIL NFR BLD AUTO: 2 % (ref 0–6)
ERYTHROCYTE [DISTWIDTH] IN BLOOD BY AUTOMATED COUNT: 16 % (ref 11–14.5)
GFR SERPL CREATININE-BSD FRML MDRD: >60 ML/MIN/1.73M2
GLUCOSE BLD-MCNC: 78 MG/DL (ref 70–125)
HCT VFR BLD AUTO: 28.9 % (ref 35–47)
HGB BLD-MCNC: 9.7 G/DL (ref 12–16)
IMM GRANULOCYTES # BLD: 0 THOU/UL
IMM GRANULOCYTES NFR BLD: 0 %
LYMPHOCYTES # BLD AUTO: 1.9 THOU/UL (ref 0.8–4.4)
LYMPHOCYTES NFR BLD AUTO: 13 % (ref 20–40)
MCH RBC QN AUTO: 28.4 PG (ref 27–34)
MCHC RBC AUTO-ENTMCNC: 33.6 G/DL (ref 32–36)
MCV RBC AUTO: 85 FL (ref 80–100)
MONOCYTES # BLD AUTO: 0.5 THOU/UL (ref 0–0.9)
MONOCYTES NFR BLD AUTO: 4 % (ref 2–10)
NEUTROPHILS # BLD AUTO: 11.5 THOU/UL (ref 2–7.7)
NEUTROPHILS NFR BLD AUTO: 80 % (ref 50–70)
PLATELET # BLD AUTO: 491 THOU/UL (ref 140–440)
PMV BLD AUTO: 8.2 FL (ref 7–10)
POTASSIUM BLD-SCNC: 4.2 MMOL/L (ref 3.5–5)
RBC # BLD AUTO: 3.42 MILL/UL (ref 3.8–5.4)
SODIUM SERPL-SCNC: 141 MMOL/L (ref 136–145)
WBC: 14.4 THOU/UL (ref 4–11)

## 2021-06-15 ASSESSMENT — MIFFLIN-ST. JEOR: SCORE: 1467.37

## 2021-06-15 NOTE — PATIENT INSTRUCTIONS - HE
"[facility] Nurse Midwives [facility]  Contact information:  Appointment line and to get a hold of CNM in clinic Monday-Friday 8 am - 5 pm:  [phone].  There are some clinics with early start times (1st appointment 7:40 am) and others with evening hours (last appointment 6:20 pm).  Most are typically open from 8 am to 5 pm.    CNM on call answering service: [phone].  Specify your hospital of choice and leave a brief message for CNM;  will then page CNM who is on call at your specified hospital and you should receive a call back with 15 minutes.  Be sure that your ringer is audible and that you can accept blocked calls so that we can get back in touch with you! This number should be reserved for urgent needs if during the day, before 8 am, after 5 pm, weekends, holidays.    Contact the on-call CNM with warning signs, such as:    vaginal bleeding     Vaginal discharge and itching or pain and burning during urination    Leg/calf pain or swelling on one side    severe abdominal pain    nausea and vomiting more than 4-5 times a day, or if you are unable to keep anything down    fever more than 100.4 degrees F.        Meet the Midwives from [facility]  You are invited to an informational meet and greet with [facility] Certified Nurse-Midwives. Our free \"Meet the Midwives\" event is a great opportunity to learn about our midwives' philosophy and experience, the hospitals where we can assist with your birth, and answer questions you may have. Partners, friends, and family are welcome to attend. Currently, this is a virtual event.  Date  First Tuesday of every month at 7 pm.    Link to next (live) meeting  [email or web address]://[email or web address].org/classes-and-events/meet-the-midwives-from-[facility]  To Join by Telephone (audio only) Call:   [phone] Phone Conference ID: [phone]#        Touring the [facility]  At this time we are offering a virtual " tour of the Maternity Care Centers at both [facility] and [facility]:   [facility]: https://www.Rover.com.org/Locations/[facility]/Maternity-Care-Center  [place]:   https://[email or web address].com.org/[facility]-care/the-birthplace/tours  https://www.Rover.com.org/Locations/[facility]/Maternity-Care-Center/#virtual_tour  When in person tours become available, registrations is required. To schedule a tour at either [place] or [facility], please do so online using the following links:  [facility] - [email or web address]://www.[employer]/registerlist.asp?s=[email or web address]&[email or web address]&vs=5&p=2&fvewp=185&ps=1&group=37&it=1&chb=229  [facility] - [email or web address]://www.[employer]/registerlist.asp?s=6&m=303&vs=5&p=2&tzouc=483&ps=1&group=38&it=1&kqn=311       DAILY FETAL MOVEMENT COUNTING    One of the best ways to keep track of a healthy baby is to notice its movements. Healthy babies are very active. However, some perfectly normal babies may sleep quietly as long as 60 minutes without moving. Babies who are having problems are sluggish and move less. Counting these movements can provide your nurse-midwife with a warning of developing problems.    You should begin this counting at the around your 7th to 8th month of your pregnancy (28-32 weeks) if you are ever concerned that there is less movement than normal for your baby.     INSTRUCTIONS    1.  If able, drink 2 glasses of fluid and lie down on one side.  Put your hand on your abdomen.  2. Count 10 separate times that the baby moves. A movement may be a kick, turn, or flip of the baby.  3.  Record the time you feel the 10th movement. When you count the 10th movement, stop counting.  4.  If one hour passes with less than 10 movements, drink a large glass of fruit juice. Then count for the another hour. If you do not reach 10 movements, call the midwives    REMEMBER      The baby may move all 10 times in an hour or less.    The baby may take up to five hours to  move 10 times.    The important thing is to know what is normal for your baby so you can tell your nurse-midwife when something different is happening.    CALL THE NURSE-MIDWIFE IF:      You do not feel 10 movements in five hours.    You have not felt the baby move all day.    It is taking longer and longer each day to get the 10th movement.      Pregnancy: Your Third Trimester Changes  How You Are Changing  Your body is preparing for the birth of your baby. Some of the most common changes are listed below. If you have any questions or concerns, ask your midwife.    You ll gain more weight from fluids, extra blood, and fat deposits. Your baby will gain an average of an ounce per day (a half a pound a week)!    Your breasts will grow as your body gets ready to feed the baby. They may be more tender. You may also notice a slight yellow or white discharge from the nipples.    Discharge from your vagina may increase. This is normal.    You might see some skin color changes on your forehead, cheeks, or nose. Most of these will go away after you deliver.  How Your Baby Is Growing    Month 7  Your baby is about 14 inches long. If born prematurely (too early), your baby would likely survive with special care.   Month 8  Your baby is building up body fat. Baby kicks strongly, but is getting too big to move around much.   Month 9  Your baby weighs nearly 7 pounds and is about 18-20 inches long. In other words, any day now...       UNDERSTANDING  LABOR    Going into labor before your 37th week of pregnancy is called  labor.  labor can cause your baby to be born too soon. This can lead to a number of health problems that may affect your baby. From 28-35 weeks, Patients are advised [facility] since they have a  Intensive Care Unit (NICU).  -Before labor, the cervix is thick and closed.  -In  labor, the cervix begins to efface (thin) and dilate (open)  over a short period of time    Are You At Risk?  Any pregnant woman can have  labor. It may start for no reason. But these risk factors can increase your chances:    Past  labor or past early birth    Smoking and drug or alcohol use during pregnancy    Multiple fetuses (twins or more)    Problems with the shape of the uterus    Bleeding during the pregnancy    The Dangers of  Birth  A baby born too soon may have health problems. This is because the baby didn t have enough time to mature. The baby then is at risk of:    Not breastfeeding well    Having immature lungs    Bleeding in the brain    Dying    Reaching Term  Your goal is to get as close to term as you can before giving birth. The closer you get to term, the higher your chance of having a healthy baby. Work with your healthcare provider. Together, you can take steps that may keep you from giving birth too early.    Symptoms of  Labor  If you believe you re having  labor, contact the midwives right away. But contractions alone don t mean you re in  labor. What matters more are changes in your cervix (the lower end of the uterus).   Symptoms of  labor include:    five or more contractions per hour    Strong & frequent contractions    Constant menstrual-like cramping    Low-back pain        Mucous or bloody vaginal discharge    Bleeding or spotting in the second or third trimester    Evaluating  Labor  Your midwife will try to find out whether you re in  labor or whether you re just having contractions.She may watch you for a few hours. The following tests may be done:    Pelvic exam to see if your cervix has effaced (thinned) and dilated (opened)    Uterine activity monitoring to detect contractions    Fetal monitoring to check the health of your baby    Ultrasound to check your baby s size and position    Caring for Yourself At Home  If you have contractions  but your cervix is still thick  and closed, the midwife may ask you to do the following at home:    Drink plenty of water.    Do fewer activities.    Rest in bed on your side.    Avoid intercourse and nipple stimulation.    When to Call Your Midwife    Five or more contractions per hour    Bag of water breaks    Bleeding or spotting     If You Need Hospital Care   labor often requires that you have hospital care and complete bed rest. You may have an IV (intravenous) line to get fluids. And you may be given pills or an injection to help prevent contractions. Finally, you may receive medication (corticosteroids) that helps your baby s lungs mature more quickly.    Syphilis Screening:   The [place] Department of Health ([facility]) provided new recommendations for screening during pregnancy. If you are pregnant, [facility] recommends testing three times during your pregnancy: at your first visit, 28 weeks, and after delivery.   Syphilis is:     Caused by a bacteria spread by sexual contact    Rising among [place] women of child-bearing age  Syphilis can:     Be passed on to infants during pregnancy or during delivery and can be life threatening    Be cured. There are ways to protect yourself and your babies.        HEALTHY PREGNANCY CARE: 26-30 WEEKS PREGNANT    You are now in your last trimester of pregnancy. Your baby is growing rapidly, can open and close her eyelids, sometimes get hiccups, and you'll probably feel her moving around more often. Your baby has breathing movements and is gaining about one ounce each day. You may notice heartburn and leg cramps. Your back may ache as your body gets used to your baby's size and length.    Discuss your work situation with your midwife or physician as needed. If you stand for long periods of time, you may need to make changes and take breaks.    Pre-register after 30 weeks online at the hospital where your baby will be born [email or web address]      Be aware of your baby's  activity level. You may be asked to do daily fetal movement counts. Contact your midwife or physician about any decreased movement.    You may have been tested for gestational diabetes today. If you are RH negative, you may have had an additional test and a Rhogam injection.    Consider receiving a Tdap vaccination to protect your baby from Pertussis/whooping cough.    Baby Feeding in the Hospital: Information, Support and Resources    As you prepare for the birth of your child, you will want to consider options for feeding your baby including breast-feeding and/or baby formula. The American Academy of Pediatrics recommends exclusive breast-feeding for the first six months (although any amount of breast-feeding is beneficial).  However, we also understand that breast-feeding is a personal choice and not for everyone. Whether or not you choose to breast-feed, your decision will be respected by our staff.    There are numerous benefits of breast-feeding; here are a few to consider:    Provides antibodies to protect your baby from infections and diseases    The cost: formula can cost over $1,500 per year    Convenience, no warming up or sterilizing bottles and supplies    The physical contact with breastfeeding can make babies feel secure, warm and comforted    What ever my feeding choice, what can I expect after I deliver my baby?    Your baby will usually be placed skin-to-skin immediately following birth. The skin to skin contact between you and your baby will be a special and memorable time. The bonding and attachment comforts your baby and has a positive effect on baby s brain development.     Having your baby  room in  with you also helps you start to learn your baby s body rhythms and sleep cycle.      You will also begin to learn your baby s cues (signals) that he or she is ready to feed.    When do I start to feed my baby?  As soon as possible after your baby s birth, you will be encouraged to begin feeding.   In the first couple of weeks, your baby will eat often.  Breastfeeding babies usually eat at least 8 times in 24 hours.  Babies fed formula usually eat at least 7 times in 24 hours.      Breast-feeding tips:    Get comfortable and use pillows for support.    Have your baby at the level of your breast, facing you,  tummy to tummy .      Touch your nipple to your baby s lips so you baby s mouth opens wide (rooting reflex).  Aim the nipple toward the roof of your baby s mouth. When your baby opens his or her mouth, pull your baby toward your breast to help your baby  latch on  to your nipple and much of the areola area.    Hand expressing your breast milk can assist with latching your baby to your breast, if needed.    Ask for help, breastfeeding may seem awkward or uncomfortable at first, this is normal. There are numerous resources available at [facility] and beyond.     If your goal is to exclusively breastfeed, avoid using any formula or artificial nipples (including bottles and pacifiers) while you are your baby are learning to breastfeed unless there is a medical reason.       Mixing breastfeeding and formula can interfere with how you begin building your milk supply.  It can impact how you and your baby  learn  to breastfeeding together and alter the natural growth of  good  bacteria in your baby s stomach.    Delay a pacifier or a bottle in the first few weeks until breastfeeding is well established. This is often around 3 weeks of age.    Ask your nurse to show you how to hand express.   Breast milk can be kept in the refrigerator or freezer for later use.    Hospital and Clinic  Resources:  -Schedule an appointment with a [facility] CNM who is also a Lactation Consultant by calling [phone]     -Schedule a clinic appointment with a [facility] CNM with dedicated clinic hours for breastfeeding assistance by calling [phone]. Breastfeeding clinic visits are at [facility] on  Mondays, [facility] on Tuesdays and [facility] on Thursdays.     -Baby Café    Pregnant and interested in breastfeeding?  Need answers to breastfeeding questions?  Want to help breastfeeding moms?  Already breastfeeding and want to meet other moms?    Join us at the Baby Café!    Baby Cafe is a free, drop-in service offering breast-feeding support for pregnant women, breast-feeding mothers and their families.  Come share tips and socialize with other mothers.  Babies and siblings are welcome (no childcare available).    Starting April 2018, Baby Café will be at 4 locations.  Please see below for the Baby Café closest to you!    [facility]  [address] [place] [address]  1st Wednesday: 10am-12pm    [facility]  [address] [place] [address]  3rd Wednesday 4-6pm    [facility]  [address] [place] [address]  4th Wednesday 10am-12:30pm    [facility]  [address] [place] [address]  4th Wednesdays: 4-6pm      Hmong, Mohawk, and Wallisian which is may be available at some sites.    For more information, please contact: [email or web address]. or [phone]    -Attend a baby weigh in at [facility].  Lactation consultants are available to answer questions  [place]: Tuesdays 1:00 - 2:00  [facility]: Mondays 1:00 - 2:00  www.[facility] HospitalInsportant.Zoeticx    -Attend one of the New Mama groups at [facility] in [place].  [facility] also offers one-on-one in home and in office lactation consults.   www.[facility].com    -Attend a LeLeche League meeting.  Multiple groups in several locations throughout the [facility]. The meetings are no-cost and always informative breastfeeding education session through [employer] La Leche League  Www.solis.org/  Medication use while breastfeeding: http://toxnet.nlm.nih.gov/newtoxnet/lactmed.htm     Online Resources:    [facility].org/baby sign  up for free online weekly e-mail    [facility].org/maternity    Breastfeedingmadesimple.com    Singular.INgrooves (La Leche League)    Normalfed.com    Womenshealth.gov/breastfeeding    Workandpump.com    Breast-feeding Supplies & Pumps:  Talk to your insurance provider or WIC (Women, Infants and Children) to learn more about options available to you. Recent health insurance changes may include additional coverage for supplies and pumps.    Public Health:  Women, Infants and Children Nutrition program (WIC): provides breast-feeding support and education in addition to formal feeding moms. 320-HXY-1842 or http://www.health.[facility]./divs/fh/wic    Family Health Home Visiting: [facility] Nurse home visits are available. Talk to your provider to see if you qualify. Most [facility] have a program available.    Additional Resources:  La Leche League is an international, nonprofit, nonsectarian organization offering information, education, and support to mothers who want to breast-feed their babies. Local groups offer phone help and monthly meetings. Visit [employer] or [employer] and us the  Find local support  drop down menu or click on the  Resources  tab.    [place] Breastfeeding Resources: 5-241-582-BABY (6988) toll free    National Breastfeeding Help Line trained breastfeeding peer counselors can help answer common breast-feeding questions by phone. Monday-Friday: English/Kuwaiti  [phone]- [phone] toll free, 1-597.530.1947 (TTY)    [facility]: [phone]-[facility] ([phone])      Resources:    Childbirth and Parenting Education:   [facility]: http://[facility] HospitalAvraham Pharmaceuticals.LessThan3/   (557) 296-BABY  [place]: (education, yoga & wellness) www.[employer]a.LessThan3  Enlightened Mama: www.enlightenedmama.com   Childbirth collective: (Parent topic nights)  www.childbirthcollective.org/  Hypnobabies:  www.hypnobabiestwincities.com/  Hypnobirthing:  Http://hypnobirthing.com/  The Birth Hour:  https://thebirthBright View Technologies.RiffRaff/online-childbirth-class/    APPS and Podcasts:   Ovia  Glow Nurture  The Birth Hour (for birth stories)   Birthful   Expectful   The Longest Shortest Time  PregnancyPodcast Haylee Dodge    Book Recommendations:   Dilia Ju's Birthing From Within--first few chapters include a new-age tone, you may prefer to skip it and keep going, because there is good stuff later.  This book recommendation covers emotional preparation, but does cover coping with pain, and use of both pharmacological and nonpharmacological methods.    Dr. Dowd' The Pregnancy Book and The Birth Book--the pregnancy book goes month-by month    Womanly Art of Breastfeeding by La Leche League International   Bestfeeding by Marivel Steele--great pictures    Mothering Your Nursing Toddler, by Jackie Chun.   Addresses dealing with so many of the challenging behaviors of a nursing toddler.  How Weaning Happens, by La Leche League.  Discusses weaning at all ages, from medically necessary weaning of an infant, all the way up to age 5 (or older), with why/why not, and strategies.  Very empowering book both for deciding to wean and deciding not to.    American College of Nurse-Midwives (ACNM) http://www.midwife.org/; look at the informational handouts at http://www.midwife.org/Share-With-Women     www.mymidwife.org    Mother to Baby (Medication and Herbal guidance in pregnancy): http://www.mothertobaby.org  Toll-Free Hotline: 800.381.5375  LactMed (Medication use while breastfeeding): http://toxnet.nlm.nih.gov/newtoxnet/lactmed.htm    Women's Health.gov:  http://www.womenshealth.gov/a-z-topics/index.html    American pregnancy association - http://americanpregnancy.org    Centering Pregnancy (group prenatal care option): http://centeringhealthcare.org    Information about doulas:  Childbirth collective: http://www.childbirthcollective.org/  Doulas of [place] (SHELLIE):  www.shellie.org  [facility]  project:  "http://Color EightdoEncision.B-Stock Solutions/     Early Childhood and Family Education (ECFE):  ECFE offers parents hands-on learning experiences that will nourish a lifetime of teachable moments.  http://ecfe.info/ecfe-home/    March of Dimes www.Rockmelt     FDA - Nutrition  www.mypyramid.gov  Under \"For Consumers,\" click on \"pregnant and breastfeeding women.\"      Centers for Disease Control and Prevention (CDC) - Vaccines : http://www.cdc.gov/vaccines/       When researching information on the web, question the validity of websites.  The Eferio .gov, .The Float Yard and.org tend to be more reliable information.  If there are a lot of advertisements, be cautious of the information provided. Stay away from blogs and chat rooms please!             Virtual Breastfeeding Support:    During this time of isolation, breastfeeding families need even more community!  Here are some area organizations offering virtual support groups for breastfeeding:      Lat Cafe Support Group, Tuesdays at 10:30 am   Run by GARRISON Cordoba of The Baby Whisperer Lactation Consultants   Go to The Baby Whisperer Lactation Consultants Facebook page and click on \"events\" for link   https://www.[employer].com/events/865855537343310/    [facility] Milk Hour, Thursdays at 2:30 pm    Run by GARRISON Doty   Go to [facility] FB page and send message to get link   https://www.[employer].com/healthfoundations/    LaLeche chaparrita [facility]/[place] holding virtual meetings the first Tuesday of each month, 8-9 pm, and the   Third Saturday, 10 - 11 am.  Go to [facility] [place] FB page; message to get link https://www.[employer].com/GhadafGLamin/?hc_location=[facility]    [facility] offers a Lactation Lounge every Friday 12pm - 1pm, run by Luis F Lloyd Leader   Sign up via link at " [employer]/cbe-lactation   https://www.[employer] Hotel [employer] Network/cbe-lactation    [facility] is offering virtual support groups every Monday, 10:30 am - 12 pm, run by nurse GARRISON   Https://www.facebook.com/events/129222479638112/    Prenatal Breastfeeding Classes:        [facility] is offering virtual breastfeeding and  care classes:  https://www.The Hotel Barter Network/education-workshops    BirthEd childbirth and breastfeeding education offering virtual prenatal breastfeeding classes  https://www.Pinnacle Biologicsmn.com/workshops

## 2021-06-15 NOTE — PROGRESS NOTES
Here alone today for a routine prenatal visit at 24w1d. Feeling well and offers no concerns today. Notes heartburn is similar to last pregnancy, currently using tums a couple times a day with good effect; advised on medications safe in pregnancy. Notes normal FM and no UCs. Feels this pregnancy is going quickly. Reviewed most recent ultrasound with DALY haynes; growth was 90%ile at 23 weeks up from 51%ile at 20w and this was concerning for Arlyn, advised of little clinical significance at this point in pregnancy and will monitor clinical measurements. Advised on upcoming labs and visit schedule. Reviewed warning s/sx and reasons to call. RTC 4 weeks.

## 2021-06-15 NOTE — PROGRESS NOTES
Here alone today for routine prenatal care at 28w1d. Feeling well and offers no concerns today. Notes normal FM and no regular UCs. GCT/hgb/RPR done today. Reviewed tdap recommendation in pregnancy and accepts today. Mood check in today, feeling well and offers no concerns. Reviewed upcoming visit schedule. Reviewed warning s/sx and reasons to call. RTC 4 weeks.

## 2021-06-15 NOTE — PATIENT INSTRUCTIONS - HE
[facility] Contact information:  Appointment line and to get a hold of CNM in clinic Monday-Friday 8 am - 5 pm:  [phone].  There are some clinics with early start times (1st appointment 7:40 am) and others with evening hours (last appointment 6:20 pm).  Most are typically open from 8 am to 5 pm.    CNM on call answering service: [phone].  Specify your hospital of choice and leave a brief message for CNM;  will then page CNM who is on call at your specified hospital and you should receive a call back with 15 minutes.  Be sure that your ringer is audible and that you can accept blocked calls so that we can get back in touch with you! This number should be reserved for urgent needs if during the day, before 8 am, after 5 pm, weekends, holidays.    Contact the on-call CNM with warning signs, such as:    vaginal bleeding     Vaginal discharge and itching or pain and burning during urination    Leg/calf pain or swelling on one side    severe abdominal pain    nausea and vomiting more than 4-5 times a day, or if you are unable to keep anything down    fever more than 100.4 degrees F.          You are invited to  Meet the [facility]    Virtual:   [email or web address]://www.[place].org/classes-and-events/meet-the-midwives-from-[facility]    A way to tour the hospital Labor and Delivery unit and meet the midwives in our group was postponed at the start of hospital restrictions following COVID-19. We will resume these when able.    Please call [phone] for ongoing updates.      UNDERSTANDING  LABOR    Going into labor before your 37th week of pregnancy is called  labor.  labor can cause your baby to be born too soon. This can lead to a number of health problems that may affect your baby. From 28-35 weeks, Patients are advised to be evaluated at [facility] since they have a  Intensive  Care Unit (NICU).  -Before labor, the cervix is thick and closed.  -In  labor, the cervix begins to efface (thin) and dilate (open) over a short period of time    Symptoms of  Labor  If you believe you re having  labor, contact the midwives right away. But contractions alone don t mean you re in  labor. What matters more are changes in your cervix (the lower end of the uterus). Symptoms of  labor include:    five or more contractions per hour    Strong & frequent contractions    Constant menstrual-like cramping    Low-back pain    Mucous or bloody vaginal discharge    Bleeding or spotting in the second or third trimester    Evaluating  Labor  Your midwife will try to find out whether you re in  labor or whether you re just having contractions.She may watch you for a few hours. The following tests may be done:    Pelvic exam to see if your cervix has effaced (thinned) and dilated (opened)    Uterine activity monitoring to detect contractions    Fetal monitoring to check the health of your baby    Ultrasound to check your baby s size and position    Caring for Yourself At Home  If you have contractions  but your cervix is still thick and closed, the midwife may ask you to do the following at home:    Drink plenty of water.    Do fewer activities.    Rest in bed on your side.    Avoid intercourse and nipple stimulation.    When to Call Your Midwife    Five or more contractions per hour    Bag of water breaks    Bleeding or spotting     If You Need Hospital Care   labor often requires that you have hospital care and complete bed rest. You may have an IV (intravenous) line to get fluids. And you may be given pills or an injection to help prevent contractions. Finally, you may receive medication (corticosteroids) that helps your baby s lungs mature more quickly.    Are You At Risk?  Any pregnant woman can have  labor. It may start for no reason. But  these risk factors can increase your chances:    Past  labor or past early birth    Smoking and drug or alcohol use during pregnancy    Multiple fetuses (twins or more)    Problems with the shape of the uterus    Bleeding during the pregnancy    The Dangers of  Birth  A baby born too soon may have health problems. This is because the baby didn t have enough time to mature. The baby then is at risk of:    Not breastfeeding well    Having immature lungs    Bleeding in the brain    Dying    Reaching Term  Your goal is to get as close to term as you can before giving birth. The closer you get to term, the higher your chance of having a healthy baby. Work with your healthcare provider. Together, you can take steps that may keep you from giving birth too early.        Testing for Gestational Diabetes in Pregnancy  [facility] are committed to providing safe care during your pregnancy. We follow the recommendations of the American Diabetes Association and the American College of Obstetricians and Gynecologists to test all pregnant women for gestational diabetes. Testing early in pregnancy (if you have risk factors) and testing all women between 26-28 weeks follows local and national guidelines for care during pregnancy. Clients who feel that they cannot consent to such testing, may choose to transfer their care to our consultant obstetricians.  What is the test?  Eat normally on the day of the test; a diet rich in protein, whole grains, and vegetables would be best. Avoid simple sugars, white flour products and juices prior to testing.  You will be asked to drink a 10 oz glucose beverage (50 gm, about the same as a can of root beer).  The product is not carbonated as it has to be consumed within 5 minutes. During the next hour, you are seen for a prenatal visit, but are asked to limit your activity around the clinic. Feel free to bring a book or your computer.   Any level less than 140 for this   glucose challenge test  is considered normal. If measured at 140 to 185, the diagnostic test, a  3 hour glucose tolerance test  will be conducted. If 2 or more readings are abnormal, the diagnosis of gestational diabetes is confirmed and a referral to a diabetes educator will be made. If the level is 185 or above, this confirms the diagnosis and a referral will be made without administering the 3 hour test.  A fasting blood sugar may be performed sometime in the first trimester if you have risk factors for the development of gestational diabetes such as a previous diagnosis or birth of a large baby or a close family relative with diabetes.  What is gestational diabetes?  Your body converts what you eat into glucose. In response to rising blood sugar levels it secretes insulin in order to be able to utilize that glucose. During pregnancy as the placenta grows and matures, it secretes hormones that are necessary to assure baby s growth and development, however, they also reduce the action of insulin in the mother. In some cases too much insulin is blocked (this is called  insulin resistance ) and blood sugar in the mother rises above a normal level. Pregnant women who have never had diabetes before but who have high blood sugar (glucose) levels during pregnancy are said to have gestational diabetes. Gestational diabetes affects about 4-7% of all pregnancies.  What if I have gestational diabetes?  If either of the tests for gestational diabetes show that you have this condition, a referral will be made to visit with the diabetes educator. The educator will help you to make wise food choices, count carbohydrates, monitor your blood sugar and record your levels in a journal. We ask that you bring this diary with you at each prenatal visit. You may meet with the educator several times during the remainder of your pregnancy.  How gestational diabetes can affect your baby  Some mothers may wonder why testing for GDM is  delayed until the early part of the 3rd trimester for most women. Gestational diabetes has an impact on the baby at the time of rapid body growth. When the mother s blood has elevated sugar levels, extra glucose crosses the placenta causing the baby to have excess weight gain ( macrosomia ). Some babies are too big to be born vaginally so their mother must have  births. Babies of mothers with uncontrolled gestational diabetes may have difficult births that can cause trauma to the mother and in rare circumstances, babies may suffer fractures or oxygen deprivation during birth. They may have difficulty maintaining their own blood sugar after birth and they may also have trouble adapting to life outside. Recent research indicates that babies of mothers with uncontrolled or undiagnosed gestational diabetes are at risk for obesity and type 2 diabetes. Women who develop gestational diabetes are more likely to develop type 2 diabetes within 15 years after their pregnancy.  Additional Information  The American College of Nurse Midwives (ACNM) provides an information sheet describing diabetes screening in pregnancy: http://www.womensdocs.com/jeff/pdf/Second_Trimester/Gestational_Diabetes.pdf    You can visit the American Diabetes Association website http://www.diabetes.org for additional information and to purchase their book,  Gestational Diabetes: What to Expect .    A brochure from the American College of Obstetrics and Gynecology is available at:   http://www.acog.org/~/media/For%20Patients/gwr165.pdf?dmc=1&lq=[id number]  Testing for gestational diabetes is a critical part of your prenatal journey. Thank you for taking good care of yourself and your baby.    HEALTHY PREGNANCY CARE: 22-26 WEEKS PREGNANT    You are finishing your second trimester. Your baby is developing rapidly. At this stage, babies have a sense of balance, can respond to touch, and are recognizing parent voices.  Your baby will be moving  around more now.  You may notice Gurvinder-Mari contractions now, which are painless and prepare the uterus for the delivery.    Nutrition: During this time, you may find that your nausea and fatigue are gone. Overall, you may feel better and have more energy than you did in your first trimester. Be sure you are getting enough calcium and iron in your diet. Your prenatal vitamins cannot supply all of the nutrients you need, so continue to eat 3-4 servings of dairy foods and 2-3 servings of meat/fish/poultry/nuts every day. Foods high in iron include: red meats, eggs, dark green vegetables, dark yellow vegetables, nuts, kidney beans and chickpeas. Some cereals are fortified with iron, so look at the food labels for 100% of the daily requirement for iron.     Discuss your work situation with your midwife or physician as needed. If you stand for long periods of time, you may need to make changes and take breaks.    [place] for childbirth and parenting classes, including an infant CPR class. Breastfeeding classes are recommended too.    Childbirth and Parenting Education:   [facility]: http://SMTDP TechnologyGarfield Medical CenterThinkCERCA/   ([phone] 897-BABY  [place]: (education, yoga & wellness) www.ActionFlow  [facility]light[employer] [place]: www.MusicIP   Childbirth collective: (Parent topic nights)  www.childbirthcollective.org/  Hypnobabies:  www.hypnobabiestwincities.com/  Hypnobirthing:  Http://hypnobirthing.com/  The Birth Hour: https://theReclip.It.[employer]/online-childbirth-class/    APPS and Podcasts:   Ovia  Glow Nurture  The Birth Hour (for birth stories)   Birthful   Expectful   The Longest Shortest Time  PregnancyPodcast Haylee Dodge    Book Recommendations:   Dilia Ju's Birthing From Within--first few chapters include a new-age tone, you may prefer to skip it and keep going, because there is good stuff later.  This book recommendation covers emotional preparation, but does cover coping with pain, and use of both  "pharmacological and nonpharmacological methods.    Dr. Dowd' The Pregnancy Book and The Birth Book--the pregnancy book goes month-by month    Womanly Art of Breastfeeding by La Leche League International   Bestfeeding by Marivel Steele--great pictures    Mothering Your Nursing Toddler, by Jackie Chun.   Addresses dealing with so many of the challenging behaviors of a nursing toddler.  How Weaning Happens, by La Leche League.  Discusses weaning at all ages, from medically necessary weaning of an infant, all the way up to age 5 (or older), with why/why not, and strategies.  Very empowering book both for deciding to wean and deciding not to.    American College of Nurse-Midwives (ACNM) http://www.midwife.org/; look at the informational handouts at http://www.midwife.org/Share-With-Women     www.mymidwife.org    Mother to Baby (Medication and Herbal guidance in pregnancy): http://www.mothertobaby.org  Toll-Free Hotline: 528.859.8269  LactMed (Medication use while breastfeeding): http://toxnet.nlm.nih.gov/newtoxnet/lactmed.htm    Women's Health.gov:  http://www.womenshealth.gov/a-z-topics/index.html    American pregnancy association - http://americanpregnancy.org    Centering Pregnancy (group prenatal care option): http://centeringhealthcare.org    Information about doulas:  Childbirth collective: http://www.childbirthcollective.org/  Doulas of [place] (SHELLIE):  www.shellie.org  [facility]  project: http://twincitiesdoulaproject.com/     Early Childhood and Family Education (ECFE):  ECFE offers parents hands-on learning experiences that will nourish a lifetime of teachable moments.  http://ecfe.info/ecfe-home/    March of Dimes www.Moneero.AntriaBio - Nutrition  www.mypyramid.gov  Under \"For Consumers,\" click on \"pregnant and breastfeeding women.\"      Centers for Disease Control and Prevention (CDC) - Vaccines : http://www.cdc.gov/vaccines/       When researching information on the web, question " the validity of websites.  The [employer] .gov, .edu and.org tend to be more reliable information.  If there are a lot of advertisements, be cautious of the information provided. Stay away from blogs and chat rooms please!    How can you care for yourself at home?   You can refer to the Starting Out Right book or find it online at http://www.[facility]altheast.org/images/stories/maternity/[facility]-Starting-Out-Right.pdf or http://www.healtheast.org/images/stories/flipbooks/[facility]-starting-out-right/[facility]-starting-out-right.html#p=8     You can sign up for a weekly parenting e-mail that gives support, tips and advice from health care professionals that starts with pregnancy and continues through the toddler years. To register, go to www.healthAccess Mobile.org/baby at any time during your pregnancy.      Baby Feeding in the Hospital: Information, Support and Resources    As you prepare for the birth of your child, you will want to consider options for feeding your baby including breast-feeding and/or baby formula. The American Academy of Pediatrics recommends exclusive breast-feeding for the first six months (although any amount of breast-feeding is beneficial).  However, we also understand that breast-feeding is a personal choice and not for everyone. Whether or not you choose to breast-feed, your decision will be respected by our staff.    There are numerous benefits of breast-feeding; here are a few to consider:    Provides antibodies to protect your baby from infections and diseases    The cost: formula can cost over $1,500 per year    Convenience, no warming up or sterilizing bottles and supplies    The physical contact with breastfeeding can make babies feel secure, warm and comforted    What ever my feeding choice, what can I expect after I deliver my baby?    Your baby will usually be placed skin-to-skin immediately following birth. The skin to skin contact between you and your baby will be a special and memorable  time. The bonding and attachment comforts your baby and has a positive effect on baby s brain development.     Having your baby  room in  with you also helps you start to learn your baby s body rhythms and sleep cycle.      You will also begin to learn your baby s cues (signals) that he or she is ready to feed.    When do I start to feed my baby?  As soon as possible after your baby s birth, you will be encouraged to begin feeding.  In the first couple of weeks, your baby will eat often.  Breastfeeding babies usually eat at least 8 times in 24 hours.  Babies fed formula usually eat at least 7 times in 24 hours.      Breast-feeding tips:    Get comfortable and use pillows for support.    Have your baby at the level of your breast, facing you,  tummy to tummy .      Touch your nipple to your baby s lips so you baby s mouth opens wide (rooting reflex).  Aim the nipple toward the roof of your baby s mouth. When your baby opens his or her mouth, pull your baby toward your breast to help your baby  latch on  to your nipple and much of the areola area.    Hand expressing your breast milk can assist with latching your baby to your breast, if needed.    Ask for help, breastfeeding may seem awkward or uncomfortable at first, this is normal. There are numerous resources available at [facility] and beyond.     If your goal is to exclusively breastfeed, avoid using any formula or artificial nipples (including bottles and pacifiers) while you are your baby are learning to breastfeed unless there is a medical reason.       Mixing breastfeeding and formula can interfere with how you begin building your milk supply.  It can impact how you and your baby  learn  to breastfeeding together and alter the natural growth of  good  bacteria in your baby s stomach.    Delay a pacifier or a bottle in the first few weeks until breastfeeding is well established. This is often around 3 weeks of age.    Ask your nurse to show  you how to hand express.   Breast milk can be kept in the refrigerator or freezer for later use.        Touring the Maternity Care Center  At this time we are offering a virtual tour of the Maternity Care Centers at both [facility] and [facility]:   [facility]: https://www.ChinaNetCloud.org/Locations/[facility]/Maternity-Care-Center  [facility]:   https://Bangee/overarching-care/the-birthplace/tours  https://www.ChinaNetCloud.org/Locations/[facility]/MaternityCare-Center/#virtual_tour  When in person tours become available, registrations is required. To schedule a tour at either [facility] or [facility], please do so online using the following links:  [facility] - https://www.You.i/registerlist.asp?s=6&m=303&vs=5&p=2&efgam=906&ps=1&group=37&it=1&sci=407  [facility] - https://www.You.i/registerlist.asp?s=6&m=303&vs=5&p=2&bhlgp=371&ps=1&group=38&it=1&zde=213    Hospital and Clinic  Resources:  -Schedule an appointment with a [facility]   CNM who is also a Lactation Consultant by calling [phone]     -Schedule a clinic appointment with a [facility] CNM with dedicated clinic hours for breastfeeding assistance by calling [phone]. Breastfeeding clinic visits are at [facility] on Mondays, [facility] on Tuesdays and [facility] on Thursdays.     Baby Café    Pregnant and interested in breastfeeding?  Need answers to breastfeeding questions?  Want to help breastfeeding moms?  Already breastfeeding and want to meet other moms?    Join us at the Baby Café!    Baby Cafe is a free, drop-in service offering breast-feeding support for pregnant women, breast-feeding mothers and their families.  Come share tips and socialize with other mothers.  Babies and siblings are welcome (no childcare available).    Starting April 2018, Baby Café will be at 4 locations.  Please  see below for the Baby Café closest to you!      [facility]  [address] [place] [address]  1st Wednesday: 10am-12pm    [facility]  [address] [place] [address]  3rd Wednesday 4-6pm    [facility]  [address] [place] [address]  4th Wednesday 10am-12:30pm    [facility]  [address] [place] [address]  4th Wednesdays: 4-6pm      Hmong, Maldivian, and Greek which is may be available at some sites.    For more information, please contact: [email or web address] or [phone]    -Attend a baby weigh in at [facility].  Lactation consultants are available to answer questions  [place]: Tuesdays 1:00 - 2:00  [facility], [facility]: Mondays 1:00 - 2:00  www.[facility] HospitalSocialbomb    -Attend one of the New Mama groups at [facility] in [facility].  [facility] also offers one-on-one in home and in office lactation consults.   www.[facility].SHADO    -Attend a [place] meeting.  Multiple groups in several locations throughout the [facility]. The meetings are no-cost and always informative breastfeeding education session through Internatal La Leche League  Www.solis.org/  Medication use while breastfeeding: http://toxnet.nlm.nih.gov/newtoxnet/lactmed.htm     Online Resources:    [facility]ast.org/baby sign up for free online weekly e-mail    [facility]ast.org/maternity    Breastfeedingmadesimple.com    li.org (La Leche League)    Normalfed.com    Womenshealth.gov/breastfeeding    Workandpump.com    Breast-feeding Supplies & Pumps:  Talk to your insurance provider or WIC (Women, Infants and Children) to learn more about options available to you. Recent health insurance changes may include additional coverage for supplies and pumps.    Public Health:  Women, Infants and Children Nutrition program (WIC): provides breast-feeding support and education in addition to formal feeding moms.  "368-CFB-5153 or http://www.health.[facility].us/divs/fh/wic    Family Health Home Visiting: Public Health Nurse home visits are available. Talk to your provider to see if you qualify. Most [facility] have a program available.    Additional Resources:  La Leche Roverto is an international, nonprofit, nonsectarian organization offering information, education, and support to mothers who want to breast-feed their babies. Local groups offer phone help and monthly meetings. Visit [facility].My eStore App or [employer].org and us the  Find local support  drop down menu or click on the  Resources  tab.    [place] Breastfeeding Resources: 3-947-187-BABY (1544) toll free    National Breastfeeding Help Line trained breastfeeding peer counselors can help answer common breast-feeding questions by phone. Monday-Friday: English/Icelandic  4-017- 994-9662 toll free, 1-526.102.5410 (TTY)    [facility] Connection: 651-326-CARE (3737)      Virtual Breastfeeding Support:    During this time of isolation, breastfeeding families need even more community!  Here are some area organizations offering virtual support groups for breastfeeding:      Lat Cafe Support Group, Tuesdays at 10:30 am   Run by GARRISON Cordoba of [employer] Baby Whisperer Lactation Consult[employer]   Go to [employer]ispe[employer]act[employer] Facebook page and click on \"events\" for link   https://www.8minutenergy Renewables.com/events/167039700258365/    [facility] Milk Hour, Thursdays at 2:30 pm    Run by GARRISON Doty   Go to [facility] FB page and send message to get link   https://www.8minutenergy Renewables.com/healthfoundations/    [facility] holding virtual meetings the first Tuesday of each month, 8-9 pm, and the   Third Saturday, 10 - 11 am.  Go to La Leche [facility] [place] FB page; message to get link https://www.8minutenergy Renewables.com/MICHELLELofGLamin/?hc_location=[facility]    [place] offers a " Lactation Lounge every Friday 12pm - 1pm, run by Luis F Lloyd Leader   Sign up via link at [email or web address]   [email or web address]    [facility] is offering virtual support groups every Monday, 10:30 am - 12 pm, run by nurse GARRISON   [email or web address]    Prenatal Breastfeeding Classes:        [employer] is offering virtual breastfeeding and  care classes:  [email or web address]    [employer] childbirth [email or web address] breastfeeding education offering virtual prenatal breastfeeding classes  [email or web address]

## 2021-06-16 NOTE — PATIENT INSTRUCTIONS - HE
"[facility] Nurse Midwives - Contact information:  Appointment line and to get a hold of CNM in clinic Monday-Friday 8 am - 5 pm:  [phone].  There are some clinics with early start times (1st appointment 7:40 am) and others with evening hours (last appointment 6:20 pm).  Most are typically open from 8 am to 5 pm.    CNM on call answering service: [phone].  Specify your hospital of choice and leave a brief message for CNM;  will then page CNM who is on call at your specified hospital and you should receive a call back with 15 minutes.  Be sure that your ringer is audible and that you can accept blocked calls so that we can get back in touch with you! This number should be reserved for urgent needs if during the day, before 8 am, after 5 pm, weekends, holidays.    Contact the on-call CNM with warning signs, such as:    vaginal bleeding     Vaginal discharge and itching or pain and burning during urination    Leg/calf pain or swelling on one side    severe abdominal pain    nausea and vomiting more than 4-5 times a day, or if you are unable to keep anything down    fever more than 100.4 degrees F.       Meet the Midwives from [facility]  You are invited to an informational meet and greet with [facility] Certified Nurse-Midwives. Our free \"Meet the Midwives\" event is a great opportunity to learn about our midwives' philosophy and experience, the hospitals where we can assist with your birth, and answer questions you may have. Partners, friends, and family are welcome to attend. Currently, this is a virtual event.  Date  First Tuesday of every month at 7 pm.    Link to next (live) meeting  [email or web address]-clinics  To Join by Telephone (audio only) Call:   [phone] Phone Conference ID: [phone]#        Touring the Maternity Care Center  At this time we are offering a virtual tour of the " Maternity Care Centers at both [facility] and [facility]:   [facility]: [email or web address]/Maternity-[email or web address]-Center  [facility]:   [email or web address]/[email or web address]  [email or web address]://[email or web address]/Locations/[facility]/Maternity-Care-Center/#virtual_tour  When in person tours become available, registrations is required. To schedule a tour at either [facility] or [facility], please do so online using the following links:  [facility] - [email or web address]  [facility] - [email or web address]&[email or web address]      Car Seat Clinics:  https://[place]ps.[place].gov/divisions/ots/child-passenger-safety/Pages/car-seat-checks.aspx  [facility]    Free Car Seat Distribution Facilities     By Appt. Address Contact Information (For appointment)      \Yes [employer]\[address]\[place],\[place] Shruthi White)\[email or web address]      Yes [facility]\ [facility]\[place],\[place] Melida Gaines)[phone]\[email or web address]      Yes [facility]\[address]\[place],\[place] Nicky Warner)[phone]\[email or web address]      Immunizations:  http://www.cdc.gov/vaccines/schedules/easy-to-read/child.html      Postpartum Depression  The first weeks of caring for a  baby are more than a full-time job. Although it is often a happy time, your feelings and moods may not be what you expected. Many women experience  baby blues.  Here are some tips to help you understand when feelings of sadness are normal, and when you should call your health care provider.    What are the baby blues?  As many as 3 of every 4 women will have short periods of mood swings, crying, or feeling cranky or restless during the  first weeks after birth. These feelings can be worse when you are tired or anxious. Women who have the baby blues often say they feel like crying but don t know why. Baby blues usually happen in the first or second week postpartum (after you give birth) and last less than a week. If you are not sleeping, becoming more upset, don t feel like you can take care of your baby, or your sadness lasts 2 weeks or more, call your health care provider.    What is postpartum depression?  About one in every 5 women will develop depression during the first few months postpartum that may be mild, moderate, or severe. Women who have postpartum depression may have some of these symptoms:    Feeling guilty     Not able to enjoy your baby and feeling like you are not bonding with your baby      Not able to sleep, even when the baby is sleeping    Sleeping too much and feeling too tired to get out of bed    Feeling overwhelmed and not able to do what you need to during the day    Not able to concentrate    Don t feel like eating    Feeling like you are not normal or not yourself anymore    Not able to make decisions    Feeling like a failure as a mother    Feeling lonely or all alone    Thinking your baby might be better off without you  If you have any of these symptoms, call your health care provider!    Which symptoms of postpartum depression are dangerous?  Sometimes a woman with postpartum depression will have thoughts of harming herself or her baby. If you find yourself thinking about hurting yourself or your baby, call your health care provider immediately.    MOTHERHOOD: THE EARLY DAYS  You prepare for the birth of your baby for many months during pregnancy, and then the first months at home after your baby is born can be a quiet, gentle time of getting to know this new person who has come to live in your home. But for most women it is not all quiet or sweet. And for some women it is a very hard time.  What Can I Expect in the  First Few Months After the Baby Comes?  New mothers and their families face many challenges in the first few months:    Your body and your hormones have to get back to normal.    You and the baby will be learning to breastfeed.    Babies only sleep a few hours at a time. The entire family will have a hard time getting enough sleep.    You and your family need to learn how to parent this new family member.    If you have a partner, you have to figure out how to stay together as a couple and maybe even start to have sex again.    You may have to figure out how to keep from getting pregnant again right away.    You may need to return to work and find day care.    How Long Will it Take for My Body to Get Back to Normal?  Some changes will occur quickly. Others will not occur as quickly.    Your uterus, cervix, and vagina will all shrink to their nonpregnant size in about 2 weeks. Your vagina may be tender and dry for a few months--especially if you are breastfeeding.    If you had stitches or hemorrhoids, your   bottom   will be sore for 2 weeks or more.    For some women who have problems urinating, it can take several months for you to be able to hold your urine when you cough or sneeze or suddenly  something heavy.    Your breast milk will   come in   2 to 3 days after the birth of your baby. It will take 6 to 8 weeks for you and the baby to get the hang of breastfeeding and find a pattern. During these first weeks, you can have engorged breasts at times and often leak milk.    Your stomach and intestines all have to fall back into place. You may have a lot of gas for a few weeks.    You may be constipated--especially if you are breastfeeding.    Your stretched stomach muscles can recover in a few weeks, but for some women it takes longer--6 months or a year--to recover.    If you had a  delivery, you may have pain or numbness around the incision for 6 months or more.    Losing the weight you gained  during pregnancy will probably take 6 months to a year. Have patience! It took 40 weeks to get here. Give yourself 40 weeks to get back.    What Can I Expect When My Hormones Change?  About 75% of all women will get the   blues.   This usually starts about 3 days after the birth of your baby. You may cry easily and feel very, very tired. A few women become very depressed. If you had a  delivery or your new baby was sick, you are at a higher risk for depression.  Call your health care provider right away if you cannot care for yourself or your baby, if you feel very nervous or worried, if you cannot stop crying, or if you are having thoughts of hurting yourself or your baby.    Taking Care of Yourself  While you are still pregnant:    Talk with your partner and your family about the time ahead. Arrange for someone to help you during the first weeks at home if you can.    Talk with your health care provider about birth control options and make a plan before the baby comes.    If you are worried about how to parent a , take parenting classes. You will learn a lot about how babies act and you will make some friends who are going through the same thing at the same time. Most [facility] have these classes.    Arrange for someone to help with baby care if you can.  After the baby comes:    Ask for help. Let other people do the cooking and cleaning and run the house. Focus on yourself and your baby.    Sleep whenever you can. Try not to be tempted to   get some things done   when the baby sleeps. This is your time to sleep, too.    Drink lots of water. You will need at least 6 big glasses of water everyday to avoid constipation and make enough breast milk. Every time you sit down to breastfeed, have a big glass of water with you to drink while you are nursing.    Eat lots of vegetables and fruit. You will need lots of vitamins and fiber to help your body get back to normal. This will also help you avoid  constipation.    Go outside and walk. Babies can go outside even if it is very cold. Fresh air and sunshine will do you both good.    Take sitz baths. Put about 6 inches of warm water in your bathtub and sit in there for 15 minutes 2 to 3 times a day. This will help your   bottom   heal more quickly. It will also give you 15 minutes of private time!    Talk to other mothers. Join a new parents group. Call [facility] and go to [facility] meetings if you are breastfeeding.     With your partner:    Keep talking. Share the experience.    Spend time alone. Even a 30-minute walk can be a date.    Start a birth control method. You can get pregnant before you even have a period. It is very important to use birth control if you do not want to get pregnant again right away.    When you have sex, use a lubricant. A lot of lubricant! Take it slow.  The first few months after a baby comes can be a lot like floating in a jar of honey--very sweet and baeza, but very sticky, too. Take time to enjoy the good parts. Remind yourself that this time will pass. Bon voyage!    FOR MORE INFORMATION  For questions about depression during and after pregnancy:  http://www.womenshealth.gov/publications/our-publications/fact-sheet/depression-pregnancy.html   After birth: The first 6 weeks:  http://www.[email or web address]/Post-Birth-and-Recovery   Breastfeeding resources:  http://www.[employer].org/health-info/getting-breastfeeding-off-to-a-good-start/    HEALTHY PREGNANCY CARE: 37 to 41 WEEKS PREGNANT    Talk with your midwife or physician about when to call with signs of labor    Regular uterine contractions that are getting closer together and/or stronger    If you think your water has broken or is leaking    Bleeding from the vagina like a period (bloody vaginal discharge is normal)    If you are not feeling your baby move    Make plans for transportation and  as needed for when you are going to the hospital.    Your  midwife or physician may offer to check your cervix for changes.     Ask your health care provider about vaccinations you may need following delivery. By now, you should have received a Tdap immunization to protect against pertussis or whooping cough. Fathers and family members who will be in close contact with the baby should also receive a Tdap shot at least two weeks before the expected birth of the baby if they have not had a Td (tetanus) shot for at least two years.    If you are past your due date, discuss the next steps leading to delivery with your midwife or physician. If you don't start labor on your own by 41 or 42 weeks, your midwife or physician may recommend giving you medicines to ripen your cervix and start labor.    Preparing for your baby: Tell your midwife or physician how you plan to feed your baby (breast or bottle), who you have chosen to do pediatric care for your baby, and if you have a boy, whether you have chosen to have him circumcised. You will need a car seat correctly installed in your vehicle to bring your baby home. As you start to set up the nursery at home for your baby, make sure the crib is safe. The mattress needs to fit snugly against the edges of the crib. If you can fit a soda can between the bars, they are too far apart and can allow the baby's head to caught between them.    Learn about infant care and feeding, including information about infant CPR. We recommend that you put your baby to sleep on his or her back to reduce the chance of Sudden Infant Death Syndrome (SIDS). To maintain a healthy environment in which your child can grow, it's best to keep your home smoke-free. By preparing ahead, your transition into parenthood will go smoothly for you and your baby.    Your midwife or physician will want to see you for a checkup 2 to 6 weeks after delivery.    If you have questions about any symptoms you are experiencing or any other concerns, call your provider or their  clinic staff at [facility] at Phone: [phone]. If it's after clinic hours, physician patients should call the [facility] at [phone] ([phone]); midwife patients should call their answering service at [phone].    How can you care for yourself at home?   You can refer to the Starting Out Right book or find it online at http://www.[facility].org/images/stories/maternity/[facility]-Starting-Out-Right.pdf or http://www.[facility].org/images/stories/flipbooks/[facility]-starting-out-right/[facility]-starting-out-right.html#p=8     You can sign up for a weekly parenting e-mail that gives support, tips and advice from health care professionals that starts with pregnancy and continues through the toddler years. To register, go to [email or web address]/baby at any time during your pregnancy.        Making Plans for Feeding My Baby    By this point, you probably have read a lot about feeding your baby.  Breastfeed or formula? Each mother s decision is her own and [facility] respects you and your choices. We ve gathered information on both breastfeeding and formula feeding to help with your decision. Talking with your physician or nurse-midwife can also help in your decision.  However you plan to feed your baby, [facility] Maternity Care Centers encourage rooming in with your baby, skin-to-skin contact and feeding your baby based on his or her cues.    Skin-to-skin contact  Being close to mom helps your baby adjust to life outside of the womb.  It helps your baby regulate their body temperature, heart rate, and breathing.  Your baby will usually be placed skin-to-skin immediately following birth or as soon as possible, if medical intervention is needed.    Rooming-In  Having your baby stay with you in your room is called  rooming-in .  Keeping your baby in your room helps you to learn how to care for your baby by getting to know your baby s cues, body rhythms and sleep cycle.        Cue-based feeding  Cues (signals) are baby s way of telling you what he or she wants.  When you learn your infant s cues, you know how to care for and feed your baby.   Feeding cues are the licking and smacking of lips, bringing their fist to their mouth, and a reflex called  rooting - where baby turns and opens his or her mouth, searching for the breast or bottle.  Crying is a late feeding cue.  Babies can feed frequently, often at least 8 times in 24 hours.  Breastfeeding facts  Breast milk is the best source of nutrition for your baby and is available at birth. In the first couple of days, your milk volume is already starting to increase, though it may not be noticeable. Breastfeed frequently to increase your milk supply. Within three to five days, you will begin to notice larger milk volumes. An increase in breast size, heaviness and firmness are often described as the milk  coming in.  Frequent breastfeeding can help breasts from getting overly firm and painful. You will know the baby is getting enough milk if your baby is having wet and dirty diapers and gaining weight.     If your goal is to exclusively breastfeed, it is important to not use any formula or artificial nipples (including bottles and pacifiers) while your baby is learning to breastfeed.  While it may seem like an  easy  option to give your baby a bottle, formula should only be given if there is a medical reason for your baby to have it.    Positioning and attachment   Get comfortable.  Use pillows as needed to support your arms and baby.  Hold baby close at the level of your breast, facing you in a tummy to tummy position.  Skin to skin helps with this.  Position the baby with his or her nose by the nipple.  There should be a straight line from baby s ear to shoulder to hips.  Tickle your baby s lips or wait for baby to open mouth wide, bring baby to breast by leading with the chin.  Aim the nipple at the roof of baby s mouth.  A rapid  sucking pattern is followed by longer, drawing pattern with occasional swallows heard.  When baby is correctly latched, your nipple and much of the areola are pulled well into baby s mouth.      Returning to work or school  Focus on a good start to breastfeeding.  Many women continue to provide breastmilk for their baby when they return to work or school.  Making plans about where to pump and store milk can make the transition go well.  Talk with other mothers who have also returned to work or school for tips and support.  Your employer s Human Resource department may be a resource as well.     Returning to work or school: (continued)     babies can mean fewer  sick  days for you.    A quality breast pump will also save time and add comfort.  Check with your insurance prior to giving birth for breast pump coverage.  Many insurance companies include a pump within your benefits.    Wait until your baby is at least three weeks old to introduce a bottle for the first time.  Have someone besides you give the bottle.    Breastfeed when you are with your baby. Reserve your bottles of breast milk for when you are away.     Your breasts will need to be  emptied  either by your baby or a pump.  Plan to pump at least twice in an eight hour day.    If you cannot pump at work, continue breastfeeding at home. Any amount of breast milk is worth giving to your baby.    Formula feeding facts  If you are planning to use formula to feed your baby, you will want to make some preparations ahead of time. Talk to your doctor or nurse-midwife about what type of formula to use. Some are iron-fortified, meaning they have extra iron in them. You will want to purchase formula and bottles before your baby is born to be sure you are ready after you return from the hospital. The [facility] do not provide formula samples to take home.    Be sure to follow formula mixing directions closely. Regular milk in the dairy case at the  grocery store should not be given to babies under 1 year old. Baby formula is sold in several forms including:    Ready-to-use. This is the most expensive, but no mixing is necessary.    Concentrated liquid. This is less expensive than ready-to-use and you mix with water.    Powder. This is the least expensive. You mix one level scoop of powdered formula with two ounces of water and stir well.    Most babies need 2.5 ounces of formula per pound of body weight each day. This means an 8-pound baby may drink about 20 ounces of formula a day; however, this is just an estimate. The most important thing is to pay attention to your baby s cues.  If your baby is always fussy, needs more iron or has certain food allergies, your physician may suggest you change your baby s formula to a different kind.     How do I warm my baby s bottles?  You may feed your baby a bottle without warming it first. It is OK for the breast milk or formula to be cool or room temperature. If your baby seems to prefer it warmed, you can put the filled bottle in a container of warm water and let it stand for a few minutes. Check the temperature of the liquid on your skin before feeding it to your baby; to be sure it isn t too hot. Do not heat bottles in the microwave. Microwaves heat food and liquids unevenly, and this can cause hot spots that can burn your baby.    How do I clean and sterilize bottles?  Sterilize bottles and nipples before you use them for the first time. You can do this by putting them in boiling water for 5 minutes. After that first time, you can wash them in hot and soapy water. Rinse them carefully to be sure there is no soap left on them. You can also wash them in the .    [facility] [phone] ([phone])    Share with Women\Arben I in Labor?  What is labor? Labor is the work that your body does to birth your baby. Your uterus (the womb) contracts(tightens). The contractions(labor pains) push your baby down onto  your cervix(the opening ofyour uterus). Thispressure causesyour cervix to open. When your cervix iscompletely open (10 centimeters dilated), you will push your baby through your vagina and out into the world.  What do contractions feel like? When contractionsfirst start, theyusually feellike cramps duringyour period. Sometimesyoufeelpain in your back. Mostoften,contractions feel like muscles pulling painfully in your lower belly. At first, the contractions will probably be 15 to 20 minutes apart.They maybe irregular and will not feel too painful. As labor goes on, the contractionsget stronger,closer together, more consistent, and more painful.  How do I time the contractions? When the contractionsseem to be coming regularly, youshould start to time them.You time your contractions by counting the number of minutes from the start of one contraction to the start of the next contraction.  What should I do during early labor when the contractions start? If it is night andyoucan sleep, do so. If it happensduring the day, there are some things you can do to take care of yourself at home: Walk. If the painsyou are having are reallabor, walking will makethecontractionscome closer together and they will be stronger,but you will be able to cope with them better if you are standing or moving around. If the contractions are early labor ones that come andgo (sometimes called false labor), walkingcan make them go away. Take a shower or bath. This will help you relax. Eat. Labor is a big event.Your body needs a lot of energy to be effective.Eat whatever you feel like eating. Drink water. Not drinking enough water can cause contractions to not be as effectiveas theyshould be.You need to be well hydrated (drinking enoughwater) to help your body work well during labor. Take a na p. If youfeel tired, lay down on your side and get all the rest you can. It helps to be rested when you go intoactive labor. Do something you enjoy. Spend  time with family. Watch a movie. Distraction will help you relax. Get a massage. If your labor is in your back, a strong massage on your lower back may feel very good. Getting a foot massage or having a partner rub your feet can also be very relaxing. Don t panic. You can do this. Your body was made for this. You are strong!  When should I call my health care provider if I think I am in labor? Your contractions have been 5 minutes or less apart for at least an hour. Your contractions are becoming so painful youcannot walk or talk during one. You think your amniotic sac (bag of irwin) breaks. You may have a big gush of amniotic fluid (water) or just fluid that runs down your legs when you walk or move or change position.  Are there other reasons to call my health care provider? If you are concerned about anything, don t hesitate to call your health care provider.You should definitely call your health care provider or go to the hospital if:  It is 3 weeks or more before your due date, and you are having contractions.  You have vaginal bleeding that is more than your period, soaks your underwear, or runs down your legs.  You have sudden severe pain that does not go away with rest.  Your baby has not movedfor several hours.  You are leaking greenish fluid.    For More Information: http://onlinelibrary.banks.com/doi/10.1111/jmwh.78052/epdf     [place] Department of Health and Human Services: Signs oflabor,labor stages, and types of birth  http://womenshealth.gov/pregnancy/childbirth-beyond/labor-birth.html#a      Childbirth and Parenting Education:     Free Video Series from [facility]: https://www.nursing.[facility].edu/laborandbirthvideos  PAPO parenting center: http://amPromise Hospital of East Los AngelesareRoom Choice.SecureWaters/   (258) 117-BABY  [place]: (education, yoga & wellness) www.Openbucks.SecureWaters  Enlightened Mama: www.enlightenedmama.com   Childbirth collective: (Parent topic nights)  www.childbirthcollective.org/  Hypnobabies:   www.Evolve Vacation Rental NetworkbiestClearCount Medical Solutionsties.com/  Hypnobirthing:  Http://hypnobirthing.com/  The Birth Hour: https://theWebymaster/online-childbirth-class/    APPS and Podcasts:   Ovia  Glow Tommy  The Birth Hour (for birth stories)   Birthful   Expectful   The Longest Shortest Time  PregnancyPodcast Haylee Marianamorelia    Book Recommendations:   Dilia Ju's Birthing From Within--first few chapters include a new-age tone, you may prefer to skip it and keep going, because there is good stuff later.  This book recommendation covers emotional preparation, but does cover coping with pain, and use of both pharmacological and nonpharmacological methods.    Dr. Dowd' The Pregnancy Book and The Birth Book--the pregnancy book goes month-by month    Womanly Art of Breastfeeding by La Leche [employer] International   Bestfeeding by Marivel Steele--great pictures    Mothering Your Nursing Toddler, by Jackie Chun.   Addresses dealing with so many of the challenging behaviors of a nursing toddler.  How Weaning Happens, by La Leche League.  Discusses weaning at all ages, from medically necessary weaning of an infant, all the way up to age 5 (or older), with why/why not, and strategies.  Very empowering book both for deciding to wean and deciding not to.    American College of Nurse-Midwives (ACNM) http://www.midwife.org/; look at the informational handouts at http://www.midwife.org/Share-With-Women     www.mymidwife.org    Mother to Baby (Medication and Herbal guidance in pregnancy): http://www.mothertobaby.org  Toll-Free Hotline: 655.783.5819  LactMed (Medication use while breastfeeding): http://toxnet.nlm.nih.gov/newtoxnet/lactmed.htm    Women's Health.gov:  http://www.womenshealth.gov/a-z-topics/index.html    American pregnancy association - http://americanpregnancy.org    Centering Pregnancy (group prenatal care option): http://centeringhealthcare.org    Information about doulas:  Childbirth collective:  "http://www.childbirthcollective.org/  Doulas of [place] (SHELLIE):  www.shellie.org  [facility]  project: http://Umthunzicitiesdoulaproject.com/     Early Childhood and Family Education (ECFE):  ECFE offers parents hands-on learning experiences that will nourish a lifetime of teachable moments.  http://ecfe.info/ecfe-home/    March of Dimes www.Quick2LAUNCH     FDA - Nutrition  www.mypyramid.gov  Under \"For Consumers,\" click on \"pregnant and breastfeeding women.\"      Centers for Disease Control and Prevention (CDC) - Vaccines : http://www.cdc.gov/vaccines/       When researching information on the web, question the validity of websites.  The [employer] .gov, [place] [employer] and.org tend to be more reliable information.  If there are a lot of advertisements, be cautious of the information provided. Stay away from blogs and chat rooms please!       Virtual Breastfeeding Support:    During this time of isolation, breastfeeding families need even more community!  Here are some area organizations offering virtual support groups for breastfeeding:      [facility] [employer] Support Group, Tuesdays at 10:30 am   Run by GARRISON Cordoba of [employer] [facility]   Go to [facility] [facility] Facebook page and click on \"events\" for link   https://www.Sypherlink.com/events/935259112891076/    [facility] Milk Hour, Thursdays at 2:30 pm    Run by GARRISON Doty   Go to [facility] FB page and send message to get link   https://www.Sypherlink.com/healthfoundations/    [facility] holding virtual meetings the first Tuesday of each month, 8-9 pm, and the   Third Saturday, 10 - 11 am.  Go to [facility] FB page; message to get link [email or web address]_[email or web address]=[facility]    [facility] offers a Lactation Lounge every Friday 12pm - 1pm, run by Sommer Mason, " Luis F Layne Leader   Sign up via link at Forrst/cbe-lactation   https://www.Forrst/cbe-lactation    [facility] is offering virtual support groups every Monday, 10:30 am - 12 pm, run by nurse GARRISON   Https://www.[email or web address]/    Prenatal Breastfeeding Classes:        [facility] is offering virtual breastfeeding and  care classes:  https://www.Forrst/education-workshops    BirthEd childbirth and breastfeeding education offering virtual prenatal breastfeeding classes  https://www.Accelerate Diagnosticsmn.com/workshops

## 2021-06-16 NOTE — PROGRESS NOTES
"Arlyn is a 36 y.o.  at 32w1d who presents to the clinic for her routine prenatal visit. Arlyn is feeling well overall. Denies any contractions, LOF or bleeding. Endorses active fetal movement. Arlyn mentions that she is \"feeling a little slow\" in regards to her movement. Feeling like she has been able to get some sleep on and off. Discussed different contraceptive options. Arlyn has used the mini-pill in the past but is interested in something longer lasting. Discussed the Paragaurd and Mirena IUD's and the Nexplannon insert. Discussed placement options at the 6 week visit. Will send additional information to Arlyn through [employer].  Arlyn is planning on formula feeding. Reviewed warning signs and symptoms. RTC in 2 weeks or PRN.  Patient was seen with student, BRETT Barker who was present for learning. I personally assessed, examined and made clinical decisions reflected in the documentation. IVA Plata,RONANM    "

## 2021-06-16 NOTE — PROGRESS NOTES
Here alone today for routine prenatal visit 34w1d. Feeling well and offers no concerns today. Reports no HA, vision changes, RUQ pain or swelling. Notes normal FM and no UCs. EPDS = 0; negative #10, no concerns. We discussed strategies to support her postpartum, reviewed her leave plans, and given written materials to review. Advised on upcoming visit and labs. Reviewed warning s/sx and reasons to call. RTC 2 weeks. Watch BP, slight rise to baseline BP and history GHTN.

## 2021-06-16 NOTE — PATIENT INSTRUCTIONS - HE
"[facility]  Contact information:  Appointment line and to get a hold of CNM in clinic Monday-Friday 8 am - 5 pm:  [phone].  There are some clinics with early start times (1st appointment 7:40 am) and others with evening hours (last appointment 6:20 pm).  Most are typically open from 8 am to 5 pm.    CNM on call answering service: [phone].  Specify your hospital of choice and leave a brief message for CNM;  will then page CNM who is on call at your specified hospital and you should receive a call back with 15 minutes.  Be sure that your ringer is audible and that you can accept blocked calls so that we can get back in touch with you! This number should be reserved for urgent needs if during the day, before 8 am, after 5 pm, weekends, holidays.    Contact the on-call CNM with warning signs, such as:    vaginal bleeding     Vaginal discharge and itching or pain and burning during urination    Leg/calf pain or swelling on one side    severe abdominal pain    nausea and vomiting more than 4-5 times a day, or if you are unable to keep anything down    fever more than 100.4 degrees F.          Meet the Midwives from [facility]  You are invited to an informational meet and greet with [facility] Certified Nurse-Midwives. Our free \"Meet the Midwives\" event is a great opportunity to learn about our midwives' philosophy and experience, the hospitals where we can assist with your birth, and answer questions you may have. Partners, friends, and family are welcome to attend. Currently, this is a virtual event.  Date  First Tuesday of every month at 7 pm.    Link to next (live) meeting  [email or web address]/classes-and-[email or web address]/meet-the-midwives-from-[facility]  To Join by Telephone (audio only) Call:   [phone] Phone Conference ID: [id number]#          Touring the Maternity Care Center  At this time we are offering a " "virtual tour of the Maternity Care Centers at both [facility] and [facility]:   [facility]: https://www.The Highway Girl.org/Locations/[facility]/Maternity-Care-Center  [facility]:   https://TopBliporg/[facility]/the-birthplace/tours  https://www.The [place] Girl.org/Locations/[facility]/Maternity-Care-Center/#virtual_tour  When in person tours become available, registrations is required. To schedule a tour at either [facility] or [facility], please do so online using the following links:  [facility] - [email or web address].com/[email or web address]&[email or web address]&[email or web address]&[email or web address]&[email or web address]&[email or web address]&it=1&mke=448  [facility] - [email or web address]://www.[email or web address].com/registerlist.asp?[email or web address]&[email or web address]&vs=5&p=[email or web address]&[email or web address]&ps=1&group=38&it=1&kfp=876      Pre-registration for Hospital Stay:  Sometime betweeen 30-37 weeks, it is recommended that you \"pre-register\" for your upcoming hospital stay on our website:    [email or web address].The Highway Girl.org/[email or web address]/he.asp    Breastfeeding and Birth Control  How do I decide what birth control method is best for me while I am breastfeeding?  Choosing a method of birth control is very personal. First, answer the following questions:      Do you want to have more children?    How much spacing between births do you want for your children?    Do you smoke or have you had any health problems, such as liver disease or a blood clot?  Talk about the answers to each of these questions with your health care provider to help you choose the best method for you.    Can I use breastfeeding as my birth control?  Using breastfeeding as your birth control (the lactational amenorrhea method) can be a good way to keep from getting pregnant in the first months after the baby is born. Each time your baby nurses, your body releases a hormone called prolactin, which stops your body from making the hormones that cause you to ovulate (release an egg). If you are not ovulating, you " cannot get pregnant.    The lactational amenorrhea method works only if:    you have not started your period yet.    you are breastfeeding only and not giving your baby any other food or drink.    you are breastfeeding at least every 4 hours during the day and every 6 hours at night.    your baby is less than 6 months old.  When any 1 of these 4 things is not happening, you no longer have good protection from getting pregnant, and you should use another form of birth control.    What birth control methods are safe for me to use while I breastfeed?    Methods without hormones  Methods without hormones do not affect you, your baby, or your breastfeeding.  Methods without hormones that are the most effective    The copper intrauterine contraceptive device (IUD) (ParaGard) is a small, T-shaped device that is in- serted into your uterus (womb) through the vagina and cervix. The copper IUD lasts for 10 years.    Sterilization (getting your tubes tied or your partner having a vasectomy) is very effective, but it is per- manent. You should choose sterilization only if you do not want to have more children.  A method without hormones that is effective    The lactational amenorrhea method described above is effective for the first 6 months.  Methods without hormones that are less effective    Natural family planning is monitoring your body for signs of ovulation and not having sex when you think you are ovulating. This method is reliable only if you are having regular periods every month.    Barrier methods (condoms, diaphragms, sponges, and spermicides) are used at the time you have sex. These methods are effective only if you use them correctly every time.    Methods with hormones  Birth control methods that use hormones can be used while you are breastfeeding. They may have a small effect on lowering the amount of milk you make. All hormones will get into your breast milk in very small amounts, but there is no known harm  to your baby from this small amount of hormone in breast milk.only methodsmethods use only 1 hormone, called progestin. You can start them right after your baby is born or wait 4 to 6 weeks to make sure your milk supply is good.     Progestin-only pills ( minipills ): If you like to take pills every day, you can use the minipill. In order forpill to work well, you have to take 1 at the same time each day. When you stop breastfeeding, you should start pills that have both estrogen and progestin because they are better at keeping you from get- ting pregnant.     Progestin IUD (Mirena): The progestin IUD is shaped and inserted into the uterus like the copper IUD. It works for up to 5 years. Both IUDs are usually inserted 4 to 6 weeks after the baby is born.    Progestin implant (Nexplanon): The progestin implant is a small matchstick-sized flexible deng. It is placed into the fatty tissue in the back of your arm. It works for up to 3 years.    Progestin shot (Depo-Provera): The progestin shot is given every 3 months.    estrogen and progestin methods  These methods use 2 hormones, called estrogen and progestin.     These methods increase your risk of a blood clot, which is already higher than normal after you have a baby. You should not use them until your baby is at least 6 weeks old. The combined methods are not recommended as the first choice for women who are breastfeeding. If a combined method is the one that you feel will be best for you to prevent getting pregnant, these methods are okay to use while breastfeeding.     Combined birth control pills: You take a pill each day.    Vaginal ring (NuvaRing): The ring is worn in the vagina for 3 weeks then left out for 1 week before youin a new ring.    Patch (Ortho Evra): The patch is placed on your skin and changed every week for 3 weeks then left off forweek before putting a new patch on a different area of your skin.    Pediatric Care Providers at [facility]  [facility]urse [facility]:    Choosing the right provider is one of the most important decisions you ll make about your health care. We can help you find the right one. Remember, you re looking for a provider you can trust and work with to improve your health and well-being, so take time to think about what you need. Depending on how complicated your health care needs are, you may need to see more than one type of provider.    Primary Care Providers: You ll see a primary care provider first for most health issues. They ll work with you to get your recommended screenings, help you manage chronic conditions, and refer you to other types of providers if you need them. Your primary care provider may be called a family physician or doctor, internist, general practitioner, nurse practitioner, or physician s assistant. Your child or teenager s provider may be called a pediatrician.  Specialists: You ll see a specialist for certain services or to treat specific conditions. Specialists include cardiologists, oncologists, psychologists, allergists, podiatrists, and orthopedists. You may need a referral from your primary care provider before you go to a specialist in order for your health plan to pay for your visit.\pardHere are some tips for finding a provider where you live:  If you already have a provider you like and want to keep working with, call their office and ask if they accept your coverage.  Call your insurance company or state Medicaid and CHIP program. Look at their website or check your member handbook to find providers in your network who take your health coverage.  Ask your friends or family if they have providers they like and use these tools to compare health care providers in your area.    Family Medicine at  [facility]:     [email or web address]    Many of our families enjoy all seeing the same doctor, who comes to know the whole family very  well. We base our practice on the knowledge of the patient in the context of family and community.  WHY CHOOSE A FAMILY MEDICINE PHYSICIAN?  Ability of the whole family to see the same doctor  Focus on the whole person, including physical and emotional health  A personal relationship with their doctor that is nurtured over time  Respect for individual and family beliefs and values  No need to change primary care providers when a certain age is reached  Coordination of care when other health care services are needed    Pediatrics at  [facility]:   [email or web address]://[email or web address]/[email or web address]/[email or web address]-care    Through a teaching affiliation with the [facility], [facility] staff keeps current on new developments in the field of pediatrics.     Everything we do centers around caring for children. We place special emphasis on wellness and prevention.pediatric care team includes a team of pediatricians and certified nurse practitioners who provide care to pediatric and adolescent patients ages 0 to 18, and some up to the age of 26. We offer preventive health maintenance for healthy children as well the diagnosis and treatment of common and chronic illnesses and injuries. In addition, we also offer several pediatric specialists who focus on adolescent health issues and developmental and behavioral issues.    Circumcision    Educational video:     [email or web address]    What is circumcision?  At birth, baby boys have loose skin that covers the head of the penis. This skin is called the foreskin. When all or part of the foreskin of the penis is cut off, this is called circumcision.  Why is circumcision done?  Circumcision is done for many reasons including Christian, cultural, looks, and health. Some Christian groups circumcise all boys as a margarita-based practice. Many people in the [place] choose to circumcise their baby  boys because they believe it is culturally normal. It is not a common practice in [place], [place], or [place].  Some parents choose circumcision so that their son will have a penis that looks like his father s if the father was also circumcised. Other people choose circumcision because they believe it is  or will protect the boy or man from infection or cancer later in life.  Does circumcision protect against infection or cancer?  Circumcision does seem to protect against some types of infection or cancer. Cancer of the penis is one type of cancer that circumcision may prevent. However, cancer of the penis is very rare. One hundred thousand circumcisions would need to be done to prevent one case of cancer of the penis. Circumcision may also decrease the chance of some sexually transmitted infections, such as HIV and human papilloma virus (HPV). See the next page for more information on the risks and benefits of circumcision.  What happens during a circumcision?  Babies born in the hospital are usually circumcised before they go home. Health care providers also perform circumcisions in their offices and clinics within a few weeks after birth.  Yazidism circumcisions are most often done at home or in a Temple.  Before the circumcision is performed, some providers give an injection (shot) of a small amount of anesthetic (numbing medicine) at the base of the penis to block the pain or put an anesthetic cream on the penis to numb the area that will be cut.  There are 2 different ways to do a circumcision. In one type, a clamp placed around the head of the penis cuts off the blood supply to the foreskin, and the foreskin above the clamp is cut off. The clamp is left on the penis until the area heals and it falls off a few days later. In another type of circumcision, the foreskin is cut off with scissors or a scalpel.  After the circumcision, petroleum jelly and sometimes gauze may be put over the area of the  penis where the skin was removed. This protects the end of the penis while it heals.  Can I keep my son s penis  if it is circumcised?  Regular washing with soap and water will keep any penis clean. Circumcision does not make the penis . Uncircumcised boys do need to be taught to clean beneath their foreskin, just like they need to be taught to wash their hands or brush their teeth.  How do I decide if I should have my son circumcised?  The American Academy of Pediatrics (AAP) says that  circumcision may have health benefits. They do not recommend circumcision for all boys as a routine procedure. The AAP recommends that you talk to your health care provider to decide if circumcision is the right choice for your family. You may also wish to discuss the question with your family or .  What are the risks and benefits of circumcision?  We do not have a lot of good scientific information about the health risks or benefits of circumcision.  Possible Risks:  Very few baby boys (less than 1 in 100) will have a problem after circumcision, such as bleeding or mild infection of the penis. These problems are usually not serious and are easy to treat.  Less common problems are:    Removal of too much or too little foreskin  Some rare problems are:    Narrowing of the opening of the penis, which can cause problems with urination    Removal of part of the penis or death of some of the other skin on the penis   Infection that spreads to other parts of the body  People used to think babies did not really feel pain. Now we know that they do. Many baby boys appear to feel a lot of pain during circumcision if anesthesia is not used.  We do not know if circumcision affects sexual function or sensation.  Possible Benefits:    Less risk for some kinds of cancers, like cancer of the penis    Fewer urinary tract (bladder or kidney) infections for babies    Less risk for some sexually transmitted  infections, such as HIV, herpes, and HPV     May protect female sexual partners from some sexually transmitted infections    For More Information  American Academy of Family Physicians: Circumcision  http://familydoctor.org/familydoctor/en/pregnancy-newborns/caring-for-newborns/infant- care/circumcision.html  MedlinePlus: Circumcision (includes a slide show on the procedure)  www.nlm.nih.gov/medlineplus/circumcision.html  American Academy of Pediatrics: Policy statement on circumcision  Http://pediatrics.aappublications.org/content/130/3/e756.abstract      Childbirth and Parenting Education:   [employer] [facility]: http://Sustainable [employer] Solutions/   (466) 475-INNL  [place]: (education, yoga & wellness) www.Cybronics  Enlightened Mama: www.enlightenedGamify.UsherBuddy   Childbirth collective: (Parent topic nights)  www.childbirthcollective.org/  Hypnobabies:  www."nSolutions[employer] Inc[employer].UsherBuddy/  Hypnobirthing:  Http://hypnobirthing.UsherBuddy/  The Birth Hour: https://Errplane/online-childbirth-class/    APPS and Podcasts:   Ovia  Glow Nurture  The Birth Hour (for birth stories)   Birthful   Expectful   The Longest Shortest Time  PregnancyPodcast Haylee Dodge    Book Recommendations:   Dilia Ju's Birthing From Within--first few chapters include a new-age tone, you may prefer to skip it and keep going, because there is good stuff later.  This book recommendation covers emotional preparation, but does cover coping with pain, and use of both pharmacological and nonpharmacological methods.    Dr. Dowd' The Pregnancy Book and The Birth Book--the pregnancy book goes month-by month    Womanly Art of Breastfeeding by La Leche League International   Bestfeeding by Marivel Steele--great pictures    Mothering Your Nursing Toddler, by Jackie Chun.   Addresses dealing with so many of the challenging behaviors of a nursing toddler.  How Weaning Happens, by La Leche League.  Discusses weaning at all ages, from medically necessary  "weaning of an infant, all the way up to age 5 (or older), with why/why not, and strategies.  Very empowering book both for deciding to wean and deciding not to.    American College of Nurse-Midwives (ACNM) http://www.midwife.org/; look at the informational handouts at http://www.midwife.org/Share-With-Women     www.mymidwife.org    Mother to Baby (Medication and Herbal guidance in pregnancy): http://www.mothertobaby.org  Toll-Free Hotline: 340.542.5482  LactMed (Medication use while breastfeeding): http://toxnet.nlm.nih.gov/newtoxnet/lactmed.htm    Women's Health.gov:  http://www.womenshealth.gov/a-z-topics/index.html    American pregnancy association - http://americanpregnancy.org    Centering Pregnancy (group prenatal care option): http://centeringhealthcare.org    Information about doulas:  Childbirth collective: http://www.childbirthcollective.org/  Doulas of [place] (SHELLIE):  www.shellie.org  [facility]  project: http://twincitiesdoulaproject.com/     Early Childhood and Family Education (ECFE):  ECFE offers parents hands-on learning experiences that will nourish a lifetime of teachable moments.  http://ecfe.info/ecfe-home/    March of Dimes www.Transmode Systems.ThermalTherapeuticSystems     FDA - Nutrition  www.mypyramid.gov  Under \"For Consumers,\" click on \"pregnant and breastfeeding women.\"      Centers for Disease Control and Prevention (CDC) - Vaccines : http://www.cdc.gov/vaccines/       When researching information on the web, question the validity of websites.  The domains .gov, .edu and.org tend to be more reliable information.  If there are a lot of advertisements, be cautious of the information provided. Stay away from blogs and chat rooms please!             Virtual Breastfeeding Support:    During this time of isolation, breastfeeding families need even more community!  Here are some area organizations offering virtual support groups for breastfeeding:      [facility] Cafe Support Group, Tuesdays at 10:30 am   Run by " "GARRISON [place] of [employer]   Go to [employer] Facebook page and click on \"events\" for link   https://www.Advanced Biomedical Technologies.com/events/070859581245467/    [facility] Milk Hour,  at 2:30 pm    Run by GARRISON Doty   Go to [facility] FB page and send message to get link   https://www.[employer] Biomedical Technologies.Impulsonic/Gondolaundations/    [facility]/[place] holding virtual meetings the first Tuesday of each month, 8-9 pm, and the   Third Saturday, 10 - 11 am.  Go to [facility] and [place] FB page; message to get link https://www.[employer].Impulsonic/LLLofGoldenValrocío/?hc_location=[facility] offers a Lactation Lounge every Friday 12pm - 1pm, run by Di Lloyd Roverto Leader   Sign up via link at Ethos Networks/cbe-lactation   https://www.Ethos Networks/cbe-lactation    [facility] is offering virtual support groups every Monday, 10:30 am - 12 pm, run by nurse IBCLC   Https://www.[employer].com/events/[email or web address]/    Prenatal Breastfeeding Classes:        [facility] is offering virtual breastfeeding and  care classes:  https://www.Ethos Networks/education-workshops    BirthEd childbirth and breastfeeding education offering virtual prenatal breastfeeding classes  https://www.birthedmn.com/workshops    "

## 2021-06-16 NOTE — PATIENT INSTRUCTIONS - HE
"[facility]  Contact information:  Appointment line and to get a hold of CNM in clinic Monday-Friday 8 am - 5 pm:  [phone].  There are some clinics with early start times (1st appointment 7:40 am) and others with evening hours (last appointment 6:20 pm).  Most are typically open from 8 am to 5 pm.    CNM on call answering service: [phone].  Specify your hospital of choice and leave a brief message for CNM;  will then page CNM who is on call at your specified hospital and you should receive a call back with 15 minutes.  Be sure that your ringer is audible and that you can accept blocked calls so that we can get back in touch with you! This number should be reserved for urgent needs if during the day, before 8 am, after 5 pm, weekends, holidays.    Contact the on-call CNM with warning signs, such as:    vaginal bleeding     Vaginal discharge and itching or pain and burning during urination    Leg/calf pain or swelling on one side    severe abdominal pain    nausea and vomiting more than 4-5 times a day, or if you are unable to keep anything down    fever more than 100.4 degrees F.          Meet the Midwives from [facility]  You are invited to an informational meet and greet with [facility]ified Nurse-Midwives. Our free \"Meet the Midwives\" event is a great opportunity to learn about our midwives' philosophy and experience, the hospitals where we can assist with your birth, and answer questions you may have. Partners, friends, and family are welcome to attend. Currently, this is a virtual event.  Date  First Tuesday of every month at 7 pm.    Link to next (live) meeting  [email or web address]-[email or web address]-the-[email or web address]-from-[facility]  To Join by Telephone (audio only) Call:   [phone] Phone Conference ID: [id number]          Touring the Maternity Care Center  At this time we are offering a " "virtual tour of the Maternity Care Centers at both [facility] and [facility]:   [facility]: https://www.[place]scape [place].org/Locations/[facility]/Maternity-Care-Center  [place]:   https://InMyShoworg/[facility]/the-birthplace/tours  https://www.[place]scape [place].org/Locations/[facility]/Maternity-Care-Center/#virtual_tour  When in person tours become available, registrations is required. To schedule a tour at either [facility] or [facility], please do so online using the following links:  [facility] - [email or web address]  [facility] - [email or web address]&[email or web address]&[email or web address]      Pre-registration for Hospital Stay:  Sometime betweeen 30-37 weeks, it is recommended that you \"pre-register\" for your upcoming hospital stay on our website:    [email or web address]    Breastfeeding and Birth Control  How do I decide what birth control method is best for me while I am breastfeeding?  Choosing a method of birth control is very personal. First, answer the following questions:      Do you want to have more children?    How much spacing between births do you want for your children?    Do you smoke or have you had any health problems, such as liver disease or a blood clot?  Talk about the answers to each of these questions with your health care provider to help you choose the best method for you.    Can I use breastfeeding as my birth control?  Using breastfeeding as your birth control (the lactational amenorrhea method) can be a good way to keep from getting pregnant in the first months after the baby is born. Each time your baby nurses, your body releases a hormone called prolactin, which stops your body from making the hormones that cause you to ovulate (release an egg). If you are not ovulating, you " cannot get pregnant.    The lactational amenorrhea method works only if:    you have not started your period yet.    you are breastfeeding only and not giving your baby any other food or drink.    you are breastfeeding at least every 4 hours during the day and every 6 hours at night.    your baby is less than 6 months old.  When any 1 of these 4 things is not happening, you no longer have good protection from getting pregnant, and you should use another form of birth control.    What birth control methods are safe for me to use while I breastfeed?    Methods without hormones  Methods without hormones do not affect you, your baby, or your breastfeeding.  Methods without hormones that are the most effective    The copper intrauterine contraceptive device (IUD) (ParaGard) is a small, T-shaped device that is in- serted into your uterus (womb) through the vagina and cervix. The copper IUD lasts for 10 years.    Sterilization (getting your tubes tied or your partner having a vasectomy) is very effective, but it is per- manent. You should choose sterilization only if you do not want to have more children.  A method without hormones that is effective    The lactational amenorrhea method described above is effective for the first 6 months.  Methods without hormones that are less effective    Natural family planning is monitoring your body for signs of ovulation and not having sex when you think you are ovulating. This method is reliable only if you are having regular periods every month.    Barrier methods (condoms, diaphragms, sponges, and spermicides) are used at the time you have sex. These methods are effective only if you use them correctly every time.    Methods with hormones  Birth control methods that use hormones can be used while you are breastfeeding. They may have a small effect on lowering the amount of milk you make. All hormones will get into your breast milk in very small amounts, but there is no known harm  to your baby from this small amount of hormone in breast milk.only methodsmethods use only 1 hormone, called progestin. You can start them right after your baby is born or wait 4 to 6 weeks to make sure your milk supply is good.     Progestin-only pills ( minipills ): If you like to take pills every day, you can use the minipill. In order forpill to work well, you have to take 1 at the same time each day. When you stop breastfeeding, you should start pills that have both estrogen and progestin because they are better at keeping you from get- ting pregnant.     Progestin IUD (Mirena): The progestin IUD is shaped and inserted into the uterus like the copper IUD. It works for up to 5 years. Both IUDs are usually inserted 4 to 6 weeks after the baby is born.    Progestin implant (Nexplanon): The progestin implant is a small matchstick-sized flexible deng. It is placed into the fatty tissue in the back of your arm. It works for up to 3 years.    Progestin shot (Depo-Provera): The progestin shot is given every 3 months.    estrogen and progestin methods  These methods use 2 hormones, called estrogen and progestin.     These methods increase your risk of a blood clot, which is already higher than normal after you have a baby. You should not use them until your baby is at least 6 weeks old. The combined methods are not recommended as the first choice for women who are breastfeeding. If a combined method is the one that you feel will be best for you to prevent getting pregnant, these methods are okay to use while breastfeeding.     Combined birth control pills: You take a pill each day.    Vaginal ring (NuvaRing): The ring is worn in the vagina for 3 weeks then left out for 1 week before youin a new ring.    Patch (Ortho Evra): The patch is placed on your skin and changed every week for 3 weeks then left off forweek before putting a new patch on a different area of your skin.    Pediatric Care Providers at [facility]  [facility]:    Choosing the right provider is one of the most important decisions you ll make about your health care. We can help you find the right one. Remember, you re looking for a provider you can trust and work with to improve your health and well-being, so take time to think about what you need. Depending on how complicated your health care needs are, you may need to see more than one type of provider.    Primary Care Providers: You ll see a primary care provider first for most health issues. They ll work with you to get your recommended screenings, help you manage chronic conditions, and refer you to other types of providers if you need them. Your primary care provider may be called a family physician or doctor, internist, general practitioner, nurse practitioner, or physician s assistant. Your child or teenager s provider may be called a pediatrician.  Specialists: You ll see a specialist for certain services or to treat specific conditions. Specialists include cardiologists, oncologists, psychologists, allergists, podiatrists, and orthopedists. You may need a referral from your primary care provider before you go to a specialist in order for your health plan to pay for your visit.\pardHere are some tips for finding a provider where you live:  If you already have a provider you like and want to keep working with, call their office and ask if they accept your coverage.  Call your insurance company or state Medicaid and CHIP program. Look at their website or check your member handbook to find providers in your network who take your health coverage.  Ask your friends or family if they have providers they like and use these tools to compare health care providers in your area.    Family Medicine at  [facility]:     [email or web address]    Many of our families enjoy all seeing the same doctor, who comes to know the whole family very  well. We base our practice on the knowledge of the patient in the context of family and community.  WHY CHOOSE A FAMILY MEDICINE PHYSICIAN?  Ability of the whole family to see the same doctor  Focus on the whole person, including physical and emotional health  A personal relationship with their doctor that is nurtured over time  Respect for individual and family beliefs and values  No need to change primary care providers when a certain age is reached  Coordination of care when other health care services are needed    Pediatrics at  [facility]:   [email or web address]://[email or web address]/specialties/pediatric-care    Through a teaching affiliation with the [facility], [facility] staff keeps current on new developments in the field of pediatrics.     Everything we do centers around caring for children. We place special emphasis on wellness and prevention.pediatric care team includes a team of pediatricians and certified nurse practitioners who provide care to pediatric and adolescent patients ages 0 to 18, and some up to the age of 26. We offer preventive health maintenance for healthy children as well the diagnosis and treatment of common and chronic illnesses and injuries. In addition, we also offer several pediatric specialists who focus on adolescent health issues and developmental and behavioral issues.    Circumcision    Educational video:     [email or web address]    What is circumcision?  At birth, baby boys have loose skin that covers the head of the penis. This skin is called the foreskin. When all or part of the foreskin of the penis is cut off, this is called circumcision.  Why is circumcision done?  Circumcision is done for many reasons including Quaker, cultural, looks, and health. Some Quaker groups circumcise all boys as a margarita-based practice. Many people in the [place] choose to circumcise their baby  boys because they believe it is culturally normal. It is not a common practice in [place], [place], or [place].  Some parents choose circumcision so that their son will have a penis that looks like his father s if the father was also circumcised. Other people choose circumcision because they believe it is  or will protect the boy or man from infection or cancer later in life.  Does circumcision protect against infection or cancer?  Circumcision does seem to protect against some types of infection or cancer. Cancer of the penis is one type of cancer that circumcision may prevent. However, cancer of the penis is very rare. One hundred thousand circumcisions would need to be done to prevent one case of cancer of the penis. Circumcision may also decrease the chance of some sexually transmitted infections, such as HIV and human papilloma virus (HPV). See the next page for more information on the risks and benefits of circumcision.  What happens during a circumcision?  Babies born in the hospital are usually circumcised before they go home. Health care providers also perform circumcisions in their offices and clinics within a few weeks after birth.  Jehovah's witness circumcisions are most often done at home or in a Evangelical.  Before the circumcision is performed, some providers give an injection (shot) of a small amount of anesthetic (numbing medicine) at the base of the penis to block the pain or put an anesthetic cream on the penis to numb the area that will be cut.  There are 2 different ways to do a circumcision. In one type, a clamp placed around the head of the penis cuts off the blood supply to the foreskin, and the foreskin above the clamp is cut off. The clamp is left on the penis until the area heals and it falls off a few days later. In another type of circumcision, the foreskin is cut off with scissors or a scalpel.  After the circumcision, petroleum jelly and sometimes gauze may be put over the area of the  penis where the skin was removed. This protects the end of the penis while it heals.  Can I keep my son s penis  if it is circumcised?  Regular washing with soap and water will keep any penis clean. Circumcision does not make the penis . Uncircumcised boys do need to be taught to clean beneath their foreskin, just like they need to be taught to wash their hands or brush their teeth.  How do I decide if I should have my son circumcised?  The American Academy of Pediatrics (AAP) says that  circumcision may have health benefits. They do not recommend circumcision for all boys as a routine procedure. The AAP recommends that you talk to your health care provider to decide if circumcision is the right choice for your family. You may also wish to discuss the question with your family or .  What are the risks and benefits of circumcision?  We do not have a lot of good scientific information about the health risks or benefits of circumcision.  Possible Risks:  Very few baby boys (less than 1 in 100) will have a problem after circumcision, such as bleeding or mild infection of the penis. These problems are usually not serious and are easy to treat.  Less common problems are:    Removal of too much or too little foreskin  Some rare problems are:    Narrowing of the opening of the penis, which can cause problems with urination    Removal of part of the penis or death of some of the other skin on the penis   Infection that spreads to other parts of the body  People used to think babies did not really feel pain. Now we know that they do. Many baby boys appear to feel a lot of pain during circumcision if anesthesia is not used.  We do not know if circumcision affects sexual function or sensation.  Possible Benefits:    Less risk for some kinds of cancers, like cancer of the penis    Fewer urinary tract (bladder or kidney) infections for babies    Less risk for some sexually transmitted  infections, such as HIV, herpes, and HPV     May protect female sexual partners from some sexually transmitted infections    For More Information  American Academy of Family Physicians: Circumcision  http://familydoctor.org/familydoctor/en/pregnancy-newborns/caring-for-newborns/infant- care/circumcision.html  MedlinePlus: Circumcision (includes a slide show on the procedure)  www.nlm.nih.gov/medlineplus/circumcision.html  American Academy of Pediatrics: Policy statement on circumcision  Http://pediatrics.aappublications.org/content/130/3/e756.abstract      Childbirth and Parenting Education:   [employer] [facility] center: http://InSilico Medicine/   [phone] [phone]  [place]: (education, yoga & wellness) www.[place]  Enlightened Mama: www.enlightenedSetMeUp.[employer]   Childbirth collective: (Parent topic nights)  www.childbirthcollective.org/  Hypnobabies:  www.Advanced Accelerator Applications.[employer]  Hypnobirthing:  Http://hypnobirthing.[employer]  The Birth Hour: https://IND Lifetech/online-childbirth-class/    APPS and Podcasts:   Ovia  Glow Nurture  The Birth Hour (for birth stories)   Birthful   Expectful   The Longest Shortest Time  PregnancyPodcast Haylee Dodge    Book Recommendations:   Dilia Ju's Birthing From Within--first few chapters include a new-age tone, you may prefer to skip it and keep going, because there is good stuff later.  This book recommendation covers emotional preparation, but does cover coping with pain, and use of both pharmacological and nonpharmacological methods.    Dr. Dowd' The Pregnancy Book and The Birth Book--the pregnancy book goes month-by month    Womanly Art of Breastfeeding by La Leche League International   Bestfeeding by Marivel Steele--great pictures    Mothering Your Nursing Toddler, by Jackie Chun.   Addresses dealing with so many of the challenging behaviors of a nursing toddler.  How Weaning Happens, by La Leche League.  Discusses weaning at all ages, from medically necessary  "weaning of an infant, all the way up to age 5 (or older), with why/why not, and strategies.  Very empowering book both for deciding to wean and deciding not to.    American College of Nurse-Midwives (ACNM) http://www.midwife.org/; look at the informational handouts at http://www.midwife.org/Share-With-Women     www.mymidwife.org    Mother to Baby (Medication and Herbal guidance in pregnancy): http://www.mothertobaby.org  Toll-Free Hotline: 805.246.4182  LactMed (Medication use while breastfeeding): http://toxnet.nlm.nih.gov/newtoxnet/lactmed.htm    Women's Health.gov:  http://www.womenshealth.gov/a-z-topics/index.html    American pregnancy association - http://americanpregnancy.org    Centering Pregnancy (group prenatal care option): http://centeringhealthcare.org    Information about doulas:  Childbirth collective: http://www.childbirthcollective.org/  Doulas of [place] (SHELLIE):  www.shellie.org  [facility]  project: http://twincitiesdoulaproject.com/     Early Childhood and Family Education (ECFE):  ECFE offers parents hands-on learning experiences that will nourish a lifetime of teachable moments.  http://ecfe.info/ecfe-home/    March of Dimes www.WeedWall.Automated Insights     FDA - Nutrition  www.mypyramid.gov  Under \"For Consumers,\" click on \"pregnant and breastfeeding women.\"      Centers for Disease Control and Prevention (CDC) - Vaccines : http://www.cdc.gov/vaccines/       When researching information on the web, question the validity of websites.  The domains .gov, .edu and.org tend to be more reliable information.  If there are a lot of advertisements, be cautious of the information provided. Stay away from blogs and chat rooms please!             Virtual Breastfeeding Support:    During this time of isolation, breastfeeding families need even more community!  Here are some area organizations offering virtual support groups for breastfeeding:      [facility] [employer] Support Group, Tuesdays at 10:30 am   Run by " "GARRISON [place] of [employer] [facility]   Go to [employer] [facility] [facility] Facebook page and click on \"events\" for link   https://www.MessageMe.[email or web address]/    [facility] Milk Hour,  at 2:30 pm    Run by GARRISON Doty   Go to [facility] FB page and send message to get link   https://www.MessageMe.Divesquare/Sharethroughundations/    [facility]/[place] holding virtual meetings the first Tuesday of each month, 8-9 pm, and the   Third Saturday, 10 - 11 am.  Go to [facility] FB page; message to get link [email or web address]/[email or web address]/?hc_location=[facility] offers a Lactation Lounge every Friday 12pm - 1pm, run by Di Llyod Roverto Leader   Sign up via link at Ganeselo.com/cbe-lactation   [email or web address]://[email or web address].[email or web address]/cbe-lactation    [facility] is offering virtual support groups every Monday, 10:30 am - 12 pm, run by nurse IBCLC   Https://[email or web address]/    Prenatal Breastfeeding Classes:        [facility] is offering virtual breastfeeding and  care classes:  https://www.Ganeselo.com/education-workshops    BirthEd childbirth and breastfeeding education offering virtual prenatal breastfeeding classes  https://www.birthedmn.com/workshops    "

## 2021-06-17 ENCOUNTER — TRANSFERRED RECORDS (OUTPATIENT)
Dept: HEALTH INFORMATION MANAGEMENT | Facility: CLINIC | Age: 36
End: 2021-06-17

## 2021-06-17 ENCOUNTER — OFFICE VISIT - HEALTHEAST (OUTPATIENT)
Dept: MIDWIFE SERVICES | Facility: CLINIC | Age: 36
End: 2021-06-17

## 2021-06-17 DIAGNOSIS — F41.9 ANXIETY: ICD-10-CM

## 2021-06-17 ASSESSMENT — ANXIETY QUESTIONNAIRES
GAD7 TOTAL SCORE: 0
3. WORRYING TOO MUCH ABOUT DIFFERENT THINGS: NOT AT ALL
2. NOT BEING ABLE TO STOP OR CONTROL WORRYING: NOT AT ALL
7. FEELING AFRAID AS IF SOMETHING AWFUL MIGHT HAPPEN: NOT AT ALL
4. TROUBLE RELAXING: NOT AT ALL
6. BECOMING EASILY ANNOYED OR IRRITABLE: NOT AT ALL
5. BEING SO RESTLESS THAT IT IS HARD TO SIT STILL: NOT AT ALL
1. FEELING NERVOUS, ANXIOUS, OR ON EDGE: NOT AT ALL

## 2021-06-17 ASSESSMENT — EDINBURGH POSTNATAL DEPRESSION SCALE (EPDS): TOTAL SCORE: 1

## 2021-06-17 ASSESSMENT — MIFFLIN-ST. JEOR: SCORE: 1451.11

## 2021-06-17 NOTE — PROGRESS NOTES
Here alone today for routine prenatal visit at 36w1d. Feeling well and reports no concerns today. Notes active FM and no regular UCs. GBS/hgb screen done today, elects for self swab. VE declined today and vertex by leopolds and confirmed by bedside US; see flow sheet for exam details. Advised she may elect for exam of cervical dilation at any of upcoming visits and feels today that she did not find it helpful in last pregnancy. Advised on upcoming visit schedule. Reviewed weight gain is slightly down from last visit, feels her appetite is normal; TWG is wnl and S=D. Blood pressure slightly elevated above her baseline again today. Continue to observe for elevated blood pressure. Reviewed warning s/sx of preeclampsia, leaking fluid, decreased FM and early vs active labor s/sx. RTC 1 week.

## 2021-06-17 NOTE — PROGRESS NOTES
"  40 week OB    S: Feels well.  Baby active.  Denies uterine cramping, vaginal bleeding or leaking of fluid. No headache, increase in edema, no epigastric pain.  Reviewed expectant management vs IOL at 41 weeks and she would like IOL.  Also discussed membrane sweep with GBS.  Does not increase risk of  GBS infection, but may increase risk of PROM.  Unable to sweep well with fetal station today, so did not attempt.    O: Vitals: /76 (Patient Site: Right Arm, Patient Position: Sitting, Cuff Size: Adult Regular)   Pulse 88   Ht 5' 3\" (1.6 m)   Wt 189 lb (85.7 kg)   LMP 2020 (Exact Date)   Breastfeeding No   BMI 33.48 kg/m    BMI= Body mass index is 33.48 kg/m .  Exam:  Constitutional: healthy, alert and no distress  Respiratory: respirations even and unlabored  Gastrointestinal: Abdomen soft, non-tender. Fundus measures appropriate for gestational age. Fetal heart tones hear without difficulty and within normal limits  : 1.5/50/-3 soft posterior  Psychiatric: mentation appears normal and affect normal/bright  A:     ICD-10-CM    1. Encounter for induction of labor  Z34.90 Asymptomatic COVID-19 Virus (CORONAVIRUS) PCR   2. Supervision of high-risk pregnancy of elderly multigravida  O09.529    3. Positive GBS test  B95.1    4. History of gestational hypertension  Z87.59      P: Labor signs and symptoms discussed, aware of numbers to call.  Discussed warning signs of PIH/preeclampsia and patient will monitor.  GBS positive. Discussed plans for labor.  Discussed postdates options and need for bi-weekly visits to start at 41 weeks if declines IOL.  IOL scheduled for 21 at 0730 at   COVID test ordered  Encouraged patient to call with any questions or concerns.  Return to clinic 1 weeks    Early ultrasounds (especially 11-14 weeks) are more accurate than using a last menstrual period to date a pregnancy.  More women go overdue if they use their LMP to date the pregnancy.  Pregnancy normally " lasts 37-42 weeks.  About half of women will go into labor by 40 5/7 weeks.  90% will go into labor by 42 weeks.  We offer induction to women at 41 weeks of pregnancy as studies have found a slightly lower  rate for those who are induced at 41-42 weeks, compared to those that waited longer.  Those who go into spontaneous labor tend to have the lowest rates of , however.   Induction between 41-42 weeks, as opposed to waiting, can reduce the chances of stillbirth.  The risk of stillbirth rises gradually after 39 weeks but increases more rapidly at 42 weeks (about 1000).  Babies who go past their due date tend to have passed meconium into the fluid more often than those who do not go past their due dates.  Other factors like: those giving birth for the first time, are over age 35, overweight, have a small baby who is not growing well, or have other health problems like high blood pressure or diabetes are more likely to have a stillbirth.  An alternated option to induction at 41 weeks, is  testing, including an ultrasound called a biophysical profile and a non stress test.  If these are normal, we assume it is safe to continue the pregnancy at that time.   You will be asked to come in more frequently after 41 weeks to perform these tests to ensure fetal well being.      Ina Kevin CNM

## 2021-06-17 NOTE — PATIENT INSTRUCTIONS - HE
"[facility] Nurse Midwives - Contact information:  Appointment line and to get a hold of CNM in clinic Monday-Friday 8 am - 5 pm:  [phone].  There are some clinics with early start times (1st appointment 7:40 am) and others with evening hours (last appointment 6:20 pm).  Most are typically open from 8 am to 5 pm.    CNM on call answering service: [phone].  Specify your hospital of choice and leave a brief message for CNM;  will then page CNM who is on call at your specified hospital and you should receive a call back with 15 minutes.  Be sure that your ringer is audible and that you can accept blocked calls so that we can get back in touch with you! This number should be reserved for urgent needs if during the day, before 8 am, after 5 pm, weekends, holidays.    Contact the on-call CNM with warning signs, such as:    vaginal bleeding     Vaginal discharge and itching or pain and burning during urination    Leg/calf pain or swelling on one side    severe abdominal pain    nausea and vomiting more than 4-5 times a day, or if you are unable to keep anything down    fever more than 100.4 degrees F.       Meet the Midwives from [facility]  You are invited to an informational meet and greet with [facility] Certified Nurse-Midwives. Our free \"Meet the Midwives\" event is a great opportunity to learn about our midwives' philosophy and experience, the hospitals where we can assist with your birth, and answer questions you may have. Partners, friends, and family are welcome to attend. Currently, this is a virtual event.  Date  First Tuesday of every month at 7 pm.    Link to next (live) meeting  https://www.[place].org/classes-and-events/meet-the-midwives-from-[facility]-clinics  To Join by Telephone (audio only) Call:   [phone] Phone Conference ID: [id number]          Touring the Maternity Care Center  At this time we are offering a virtual tour of the " Maternity Care Centers at both [facility] and [facility]:   [facility]: https://www.[place].org/Locations/[facility]/Maternity-Care-Center  [place]:   https://[facility] Baptist[facility]Tread.org/overarchBoston Medical Center-care/the-birthplace/tours  https://www.[place].org/Locations/[facility]/Maternity-Care-Center/#virtual_tour  When in person tours become available, registrations is required. To schedule a tour at either [place] [facility] or [facility], please do so online using the following links:  [facility] - [email or web address]://www.Globel Direct/registerlist.asp?s=[email or web address]&m=303&vs=5&p=2&wolyw=036&ps=1&group=37&it=1&oeb=242  [facility] - https://www.[employer]/registerlist.asp?s=6&m=303&vs=5&p=2&hnpmh=288&ps=1&group=38&it=1&jqa=917    Childbirth and Parenting Education:     Free Video Series from [facility]: https://www.nursing.[facility]/laborandbirthvideos  [facility]: http://[facility] HospitalTugende.Thrasos/   [phone]  [place]: (education, yoga & wellness) www.[employer]  Enlightened Mama: www.AutoShagenedmama.Thrasos   Childbirth collective: (Parent topic nights)  www.childbirthcollective.org/  Hypnobabies:  www.hypnobabiestwincities.com/  Hypnobirthing:  Http://hypnobirthing.com/  The Birth Hour: https://theKuonahouKILTR/online-childbirth-class/    APPS and Podcasts:   Bertha Sanders  The Birth Hour (for birth stories)   Birthful   Expectful   The Longest Shortest Time  PregnancyPodcast Haylee Dodge    Breastfeeding Information:  An excellent 15-minute video on preparing for a good breastfeeding experience, including how to ensure you have a good milk supply and a comfortable latch, can be found here:  https://firstdroplets.com/      Book Recommendations:   Dilia Oceanport's Birthing From Within--first few chapters include a new-age tone, you may prefer to skip it and keep going, because there is good stuff later.  This book recommendation covers emotional preparation, but does cover  "coping with pain, and use of both pharmacological and nonpharmacological methods.    Dr. Dowd' The Pregnancy Book and The Birth Book--the pregnancy book goes month-by month    Womanly Art of Breastfeeding by La Leche League International   Breastfeeding by Marivel Steele--great pictures    Mothering Your Nursing Toddler, by Jackie Chun.   Addresses dealing with so many of the challenging behaviors of a nursing toddler.  How Weaning Happens, by La Leche League.  Discusses weaning at all ages, from medically necessary weaning of an infant, all the way up to age 5 (or older), with why/why not, and strategies.  Very empowering book both for deciding to wean and deciding not to.    American College of Nurse-Midwives (ACNM) http://www.midwife.org/; look at the informational handouts at http://www.midwife.org/Share-With-Women     www.mymidwife.org    Mother to Baby (Medication and Herbal guidance in pregnancy): http://www.mothertobaby.org  Toll-Free Hotline: 147.768.9550  LactMed (Medication use while breastfeeding): http://toxnet.nlm.nih.gov/newtoxnet/lactmed.htm    Women's Health.gov:  http://www.womenshealth.gov/a-z-topics/index.html    American pregnancy association - http://americanpregnancy.org    Centering Pregnancy (group prenatal care option): http://centeringhealthcare.org    Information about doulas:  Childbirth collective: http://www.childbirthcollective.org/  Doulas of [place] (SHELLIE):  www.shellie.org  [facility]  project: http://twincitiesdoulaproject.com/     Early Childhood and Family Education (ECFE):  ECFE offers parents hands-on learning experiences that will nourish a lifetime of teachable moments.  http://ecfe.info/ecfe-home/    March of Dimes www.Digital Accademia - Nutrition  www.mypyramid.gov  Under \"For Consumers,\" click on \"pregnant and breastfeeding women.\"      Centers for Disease Control and Prevention (CDC) - Vaccines : http://www.cdc.gov/vaccines/       When " researching information on the web, question the validity of websites.  The RSI ([employer]) .gov, .edu and.org tend to be more reliable information.  If there are a lot of advertisements, be cautious of the information provided. Stay away from blogs and chat rooms please!     [place] Screening Program  Http://www.health.[facility].mn.us/newbornscreening/  [place] newborns are tested soon after birth for more than 50 hidden, rare disorders, including hearing loss and critical congenital heart disease (CCHD). This site provides resources and information for families and providers.    HEALTHY PREGNANCY CARE: 37 to 41 WEEKS PREGNANT    Talk with your midwife or physician about when to call with signs of labor    Regular uterine contractions that are getting closer together and/or stronger    If you think your water has broken or is leaking    Bleeding from the vagina like a period (bloody vaginal discharge is normal)    If you are not feeling your baby move    Make plans for transportation and  as needed for when you are going to the hospital.    Your midwife or physician may offer to check your cervix for changes.     Ask your health care provider about vaccinations you may need following delivery. By now, you should have received a Tdap immunization to protect against pertussis or whooping cough. Fathers and family members who will be in close contact with the baby should also receive a Tdap shot at least two weeks before the expected birth of the baby if they have not had a Td (tetanus) shot for at least two years.    If you are past your due date, discuss the next steps leading to delivery with your midwife or physician. If you don't start labor on your own by 41 or 42 weeks, your midwife or physician may recommend giving you medicines to ripen your cervix and start labor.  Induction of labor:  http://onlinelibrary.banks.com/store/10.1016/j.jmwh.2008.04.018/asset/j.jmwh.2008.04.018.pdf?v=1&t=gpds8usf&l=86ol612p8kq46w85q5w1tc4s595939o1xk2dj598    Preparing for your baby: Tell your midwife or physician how you plan to feed your baby (breast or bottle), who you have chosen to do pediatric care for your baby, and if you have a boy, whether you have chosen to have him circumcised. You will need a car seat correctly installed in your vehicle to bring your baby home. As you start to set up the nursery at home for your baby, make sure the crib is safe. The mattress needs to fit snugly against the edges of the crib. If you can fit a soda can between the bars, they are too far apart and can allow the baby's head to caught between them.    Learn about infant care and feeding, including information about infant CPR. We recommend that you put your baby to sleep on his or her back to reduce the chance of Sudden Infant Death Syndrome (SIDS). To maintain a healthy environment in which your child can grow, it's best to keep your home smoke-free. By preparing ahead, your transition into parenthood will go smoothly for you and your baby.    Your midwife or physician will want to see you for a checkup 2 to 6 weeks after delivery.    If you have questions about any symptoms you are experiencing or any other concerns, call your provider or their clinic staff at [facility] at Phone: [phone]. If it's after clinic hours, physician patients should call the Care Connection at [phone] ([phone]); midwife patients should call their answering service at [phone].    How can you care for yourself at home?   You can refer to the Starting Out Right book or find it online at http://www.healtheast.org/images/stories/maternity/[facility]-Starting-Out-Right.pdf or http://www.healtheast.org/images/stories/flipbooks/[facility]-starting-out-right/[facility]-starting-out-right.html#p=8     You can sign  up for a weekly parenting e-mail that gives support, tips and advice from health care professionals that starts with pregnancy and continues through the toddler years. To register, go to www.[facility].org/baby at any time during your pregnancy.        Making Plans for Feeding My Baby    By this point, you probably have read a lot about feeding your baby.  Breastfeed or formula? Each mother s decision is her own and [facility] respects you and your choices. We ve gathered information on both breastfeeding and formula feeding to help with your decision. Talking with your physician or nurse-midwife can also help in your decision.  However you plan to feed your baby, [facility] Maternity Care Centers encourage rooming in with your baby, skin-to-skin contact and feeding your baby based on his or her cues.    Skin-to-skin contact  Being close to mom helps your baby adjust to life outside of the womb.  It helps your baby regulate their body temperature, heart rate, and breathing.  Your baby will usually be placed skin-to-skin immediately following birth or as soon as possible, if medical intervention is needed.    Rooming-In  Having your baby stay with you in your room is called  rooming-in .  Keeping your baby in your room helps you to learn how to care for your baby by getting to know your baby s cues, body rhythms and sleep cycle.       Cue-based feeding  Cues (signals) are baby s way of telling you what he or she wants.  When you learn your infant s cues, you know how to care for and feed your baby.   Feeding cues are the licking and smacking of lips, bringing their fist to their mouth, and a reflex called  rooting - where baby turns and opens his or her mouth, searching for the breast or bottle.  Crying is a late feeding cue.  Babies can feed frequently, often at least 8 times in 24 hours.    Breastfeeding facts  Breast milk is the best source of nutrition for your baby and is available at birth. In the first couple  of days, your milk volume is already starting to increase, though it may not be noticeable. Breastfeed frequently to increase your milk supply. Within three to five days, you will begin to notice larger milk volumes. An increase in breast size, heaviness and firmness are often described as the milk  coming in.  Frequent breastfeeding can help breasts from getting overly firm and painful. You will know the baby is getting enough milk if your baby is having wet and dirty diapers and gaining weight.     If your goal is to exclusively breastfeed, it is important to not use any formula or artificial nipples (including bottles and pacifiers) while your baby is learning to breastfeed.  While it may seem like an  easy  option to give your baby a bottle, formula should only be given if there is a medical reason for your baby to have it.      Positioning and attachment   Get comfortable.  Use pillows as needed to support your arms and baby.  Hold baby close at the level of your breast, facing you in a tummy to tummy position.  Skin to skin helps with this.  Position the baby with his or her nose by the nipple.  There should be a straight line from baby s ear to shoulder to hips.  Tickle your baby s lips or wait for baby to open mouth wide, bring baby to breast by leading with the chin.  Aim the nipple at the roof of baby s mouth.  A rapid sucking pattern is followed by longer, drawing pattern with occasional swallows heard.  When baby is correctly latched, your nipple and much of the areola are pulled well into baby s mouth.      Returning to work or school  Focus on a good start to breastfeeding.  Many women continue to provide breastmilk for their baby when they return to work or school.  Making plans about where to pump and store milk can make the transition go well.  Talk with other mothers who have also returned to work or school for tips and support.  Your employer s Human Resource department may be a resource as  well.     Returning to work or school: (continued)     babies can mean fewer  sick  days for you.    A quality breast pump will also save time and add comfort.  Check with your insurance prior to giving birth for breast pump coverage.  Many insurance companies include a pump within your benefits.    Wait until your baby is at least three weeks old to introduce a bottle for the first time.  Have someone besides you give the bottle.    Breastfeed when you are with your baby. Reserve your bottles of breast milk for when you are away.     Your breasts will need to be  emptied  either by your baby or a pump.  Plan to pump at least twice in an eight hour day.    If you cannot pump at work, continue breastfeeding at home. Any amount of breast milk is worth giving to your baby.    Formula feeding facts  If you are planning to use formula to feed your baby, you will want to make some preparations ahead of time. Talk to your doctor or nurse-midwife about what type of formula to use. Some are iron-fortified, meaning they have extra iron in them. You will want to purchase formula and bottles before your baby is born to be sure you are ready after you return from the hospital. The [facility] do not provide formula samples to take home.    Be sure to follow formula mixing directions closely. Regular milk in the dairy case at the grocery store should not be given to babies under 1 year old. Baby formula is sold in several forms including:    Ready-to-use. This is the most expensive, but no mixing is necessary.    Concentrated liquid. This is less expensive than ready-to-use and you mix with water.    Powder. This is the least expensive. You mix one level scoop of powdered formula with two ounces of water and stir well.    Most babies need 2.5 ounces of formula per pound of body weight each day. This means an 8-pound baby may drink about 20 ounces of formula a day; however, this is just an estimate. The most  important thing is to pay attention to your baby s cues.  If your baby is always fussy, needs more iron or has certain food allergies, your physician may suggest you change your baby s formula to a different kind.     How do I warm my baby s bottles?  You may feed your baby a bottle without warming it first. It is OK for the breast milk or formula to be cool or room temperature. If your baby seems to prefer it warmed, you can put the filled bottle in a container of warm water and let it stand for a few minutes. Check the temperature of the liquid on your skin before feeding it to your baby; to be sure it isn t too hot. Do not heat bottles in the microwave. Microwaves heat food and liquids unevenly, and this can cause hot spots that can burn your baby.    How do I clean and sterilize bottles?  Sterilize bottles and nipples before you use them for the first time. You can do this by putting them in boiling water for 5 minutes. After that first time, you can wash them in hot and soapy water. Rinse them carefully to be sure there is no soap left on them. You can also wash them in the .    [facility] Connection [phone] ([phone])    Baby Café    Pregnant and interested in breastfeeding?  Need answers to breastfeeding questions?  Want to help breastfeeding moms?  Already breastfeeding and want to meet other moms?    Join us at the Baby Café!    Baby Cafe is a free, drop-in service offering breast-feeding support for pregnant women, breast-feeding mothers and their families.  Come share tips and socialize with other mothers.  Babies and siblings are welcome (no childcare available).    Starting April 2018, Baby Café will be at 4 locations.  Please see below for the Baby Café closest to you!      [facility]  [address] [place] [address]  1st Wednesday: 10am-12pm    [address] [address]  3rd Wednesday 4-6pm    [facility]  [address]  "[place] [address]   10am-12:30pm    [facility]  [address] [place] [address]  : 4-6pm      Hmong, Czech, and Andorran which is may be available at some sites.    For more information, please contact: [email or web address] [email or web address]. or [phone]      Virtual Breastfeeding Support:    During this time of isolation, breastfeeding families need even more community!  Here are some area organizations offering virtual support groups for breastfeeding:      [facility] Support Group,  at 10:30 am   Run by GARRISON Cordoba of [employer] [facility]   Go to [facility] Facebook page and click on \"events\" for link   [email or web address]    [facility] Milk Hour,  at 2:30 pm    Run by GARRISON Doty   Go to [facility] FB page and send message to get link   [email or web address]://www.[email or web address].Innovashop.tv/healthfoundations/    [facility] holding virtual meetings the first Tuesday of each month, 8-9 pm, and the   Third Saturday, 10 - 11 am.  Go to [facility] [place] FB page; message to get link [email or web address].[email or web address].Innovashop.tv/LLLofGoldenValley/?hc_location=[facility]    [facility] offers a Lactation Lounge every Friday 12pm - 1pm, run by [facility] Leader   Sign up via link at IMayGou/cbe-lactation   [email or web address]://[email or web address].IMayGou/[email or web address]-lactation    [facility] is offering virtual support groups every Monday, 10:30 am - 12 pm, run by nurse IBCLC   Https://[email or web address]/    Prenatal Breastfeeding Classes:        [facility] is offering virtual breastfeeding and  care classes:  https://www.IMayGou/education-workshops    [facility] childbirth and breastfeeding education offering virtual prenatal breastfeeding " classes  [email or web address]://www.Verosee.com/workshops

## 2021-06-17 NOTE — PATIENT INSTRUCTIONS - HE
"[facility] - Contact information:  Appointment line and to get a hold of CNM in clinic Monday-Friday 8 am - 5 pm:  [phone].  There are some clinics with early start times (1st appointment 7:40 am) and others with evening hours (last appointment 6:20 pm).  Most are typically open from 8 am to 5 pm.    CNM on call answering service: [phone].  Specify your hospital of choice and leave a brief message for CNM;  will then page CNM who is on call at your specified hospital and you should receive a call back with 15 minutes.  Be sure that your ringer is audible and that you can accept blocked calls so that we can get back in touch with you! This number should be reserved for urgent needs if during the day, before 8 am, after 5 pm, weekends, holidays.    Contact the on-call CNM with warning signs, such as:    vaginal bleeding     Vaginal discharge and itching or pain and burning during urination    Leg/calf pain or swelling on one side    severe abdominal pain    nausea and vomiting more than 4-5 times a day, or if you are unable to keep anything down    fever more than 100.4 degrees F.       Meet the Midwives from [facility]  You are invited to an informational meet and greet with [facility] Certified Nurse-Midwives. Our free \"Meet the Midwives\" event is a great opportunity to learn about our midwives' philosophy and experience, the hospitals where we can assist with your birth, and answer questions you may have. Partners, friends, and family are welcome to attend. Currently, this is a virtual event.  Date  First Tuesday of every month at 7 pm.    Link to next (live) meeting  https://[email or web address].[email or web address]/classes-and-events/meet-the-midwives-from-[facility]-clinics  To Join by Telephone (audio only) Call:   [phone] Phone Conference ID: [id number]          Touring the Maternity Care Center  At this time we are offering a virtual tour of the " Maternity Care Centers at both [facility] and [facility]:   [facility]: https://www.[place].org/Locations/[facility]/Maternity-Care-Center  [facility]:   https://[facility].org/[facility]-care/the-birthplace/tours  https://www.[facility].org/Locations/[facility]/Maternity-Care-Center/#virtual_tour  When in person tours become available, registrations is required. To schedule a tour at either [facility] or [facility], please do so online using the following links:  [facility] - https://www.Alyotech/registerlist.asp?s=6&m=303&vs=5&p=2&pysfs=383&ps=1&group=37&it=1&rfk=581  [facility] - https://www.Aly[employer]/registerlist.asp?s=6&m=303&vs=5&p=2&yylcl=896&ps=1&group=38&it=1&wpe=248    Childbirth and Parenting Education:     Free Video Series from [facility]: https://www.nursing.[facility]/laborandbirthvideos  [facility]: http://[facility].Spondo/   [phone]  [place]: (education, yoga & wellness) www.[employer]  Enlightened Mama: www.TopChalksenedmama.Spondo   Childbirth collective: (Parent topic nights)  www.childbirthcollective.org/  Hypnobabies:  www.hypnobabiestwincities.com/  Hypnobirthing:  Http://hypnobirthing.com/  The Birth Hour: https://theEasySizehouG-In[employer]ator [employer]/online-childbirth-class/    APPS and Podcasts:   Bertha Sanders  The Birth Hour (for birth stories)   Birthful   Expectful   The Longest Shortest Time  PregnancyPodcast Haylee Dodge    Breastfeeding Information:  An excellent 15-minute video on preparing for a good breastfeeding experience, including how to ensure you have a good milk supply and a comfortable latch, can be found here:  https://firstdroplets.com/      Book Recommendations:   Dilia Unionville's Birthing From Within--first few chapters include a new-age tone, you may prefer to skip it and keep going, because there is good stuff later.  This book recommendation covers emotional preparation, but does cover  "coping with pain, and use of both pharmacological and nonpharmacological methods.    Dr. Dowd' The Pregnancy Book and The Birth Book--the pregnancy book goes month-by month    Womanly Art of Breastfeeding by La Leche League International   Breastfeeding by Marivel Steele--great pictures    Mothering Your Nursing Toddler, by Jackie Chun.   Addresses dealing with so many of the challenging behaviors of a nursing toddler.  How Weaning Happens, by La Leche League.  Discusses weaning at all ages, from medically necessary weaning of an infant, all the way up to age 5 (or older), with why/why not, and strategies.  Very empowering book both for deciding to wean and deciding not to.    American College of Nurse-Midwives (ACNM) http://www.midwife.org/; look at the informational handouts at http://www.midwife.org/Share-With-Women     www.mymidwife.org    Mother to Baby (Medication and Herbal guidance in pregnancy): http://www.mothertobaby.org  Toll-Free Hotline: 149.154.5534  LactMed (Medication use while breastfeeding): http://toxnet.nlm.nih.gov/newtoxnet/lactmed.htm    Women's Health.gov:  http://www.womenshealth.gov/a-z-topics/index.html    American pregnancy association - http://americanpregnancy.org    Centering Pregnancy (group prenatal care option): http://centeringhealthcare.org    Information about doulas:  Childbirth collective: http://www.childbirthcollective.org/  Doulas of [place] (SHELLIE):  www.shellie.org  [facility]  project: http://twincitiesdoulaproject.com/     Early Childhood and Family Education (ECFE):  ECFE offers parents hands-on learning experiences that will nourish a lifetime of teachable moments.  http://ecfe.info/ecfe-home/    March of Dimes www.CircuitLab - Nutrition  www.mypyramid.gov  Under \"For Consumers,\" click on \"pregnant and breastfeeding women.\"      Centers for Disease Control and Prevention (CDC) - Vaccines : http://www.cdc.gov/vaccines/       When " researching information on the web, question the validity of websites.  The [place]unt .gov, .edu and.org tend to be more reliable information.  If there are a lot of advertisements, be cautious of the information provided. Stay away from blogs and chat rooms please!     [place] Screening Program  Http://www.health.[facility].[place].us/newbornscreening/  [place] newborns are tested soon after birth for more than 50 hidden, rare disorders, including hearing loss and critical congenital heart disease (CCHD). This site provides resources and information for families and providers.    HEALTHY PREGNANCY CARE: 37 to 41 WEEKS PREGNANT    Talk with your midwife or physician about when to call with signs of labor    Regular uterine contractions that are getting closer together and/or stronger    If you think your water has broken or is leaking    Bleeding from the vagina like a period (bloody vaginal discharge is normal)    If you are not feeling your baby move    Make plans for transportation and  as needed for when you are going to the hospital.    Your midwife or physician may offer to check your cervix for changes.     Ask your health care provider about vaccinations you may need following delivery. By now, you should have received a Tdap immunization to protect against pertussis or whooping cough. Fathers and family members who will be in close contact with the baby should also receive a Tdap shot at least two weeks before the expected birth of the baby if they have not had a Td (tetanus) shot for at least two years.    If you are past your due date, discuss the next steps leading to delivery with your midwife or physician. If you don't start labor on your own by 41 or 42 weeks, your midwife or physician may recommend giving you medicines to ripen your cervix and start labor.  Induction of labor:  http://onlinelibrary.banks.com/store/10.1016/j.jmwh.2008.04.018/asset/j.jmwh.2008.04.018.pdf?v=1&t=xqlv5upu&r=[email or web address]    Preparing for your baby: Tell your midwife or physician how you plan to feed your baby (breast or bottle), who you have chosen to do pediatric care for your baby, and if you have a boy, whether you have chosen to have him circumcised. You will need a car seat correctly installed in your vehicle to bring your baby home. As you start to set up the nursery at home for your baby, make sure the crib is safe. The mattress needs to fit snugly against the edges of the crib. If you can fit a soda can between the bars, they are too far apart and can allow the baby's head to caught between them.    Learn about infant care and feeding, including information about infant CPR. We recommend that you put your baby to sleep on his or her back to reduce the chance of Sudden Infant Death Syndrome (SIDS). To maintain a healthy environment in which your child can grow, it's best to keep your home smoke-free. By preparing ahead, your transition into parenthood will go smoothly for you and your baby.    Your midwife or physician will want to see you for a checkup 2 to 6 weeks after delivery.    If you have questions about any symptoms you are experiencing or any other concerns, call your provider or their clinic staff at [facility] at Phone: [phone]. If it's after clinic hours, physician patients should call the Care Connection at [phone] ([phone]); midwife patients should call their answering service at [phone].    How can you care for yourself at home?   You can refer to the Starting Out Right book or find it online at http://www.healtheast.org/images/stories/maternity/[facility]-Starting-Out-Right.pdf or http://www.healtheast.org/images/stories/flipbooks/[facility]-starting-out-right/[facility]-starting-out-right.html#p=8     You can sign  up for a weekly parenting e-mail that gives support, tips and advice from health care professionals that starts with pregnancy and continues through the toddler years. To register, go to www.[facility].org/baby at any time during your pregnancy.        Making Plans for Feeding My Baby    By this point, you probably have read a lot about feeding your baby.  Breastfeed or formula? Each mother s decision is her own and [facility] respects you and your choices. We ve gathered information on both breastfeeding and formula feeding to help with your decision. Talking with your physician or nurse-midwife can also help in your decision.  However you plan to feed your baby, [facility] Maternity Care Centers encourage rooming in with your baby, skin-to-skin contact and feeding your baby based on his or her cues.    Skin-to-skin contact  Being close to mom helps your baby adjust to life outside of the womb.  It helps your baby regulate their body temperature, heart rate, and breathing.  Your baby will usually be placed skin-to-skin immediately following birth or as soon as possible, if medical intervention is needed.    Rooming-In  Having your baby stay with you in your room is called  rooming-in .  Keeping your baby in your room helps you to learn how to care for your baby by getting to know your baby s cues, body rhythms and sleep cycle.       Cue-based feeding  Cues (signals) are baby s way of telling you what he or she wants.  When you learn your infant s cues, you know how to care for and feed your baby.   Feeding cues are the licking and smacking of lips, bringing their fist to their mouth, and a reflex called  rooting - where baby turns and opens his or her mouth, searching for the breast or bottle.  Crying is a late feeding cue.  Babies can feed frequently, often at least 8 times in 24 hours.    Breastfeeding facts  Breast milk is the best source of nutrition for your baby and is available at birth. In the first couple  of days, your milk volume is already starting to increase, though it may not be noticeable. Breastfeed frequently to increase your milk supply. Within three to five days, you will begin to notice larger milk volumes. An increase in breast size, heaviness and firmness are often described as the milk  coming in.  Frequent breastfeeding can help breasts from getting overly firm and painful. You will know the baby is getting enough milk if your baby is having wet and dirty diapers and gaining weight.     If your goal is to exclusively breastfeed, it is important to not use any formula or artificial nipples (including bottles and pacifiers) while your baby is learning to breastfeed.  While it may seem like an  easy  option to give your baby a bottle, formula should only be given if there is a medical reason for your baby to have it.      Positioning and attachment   Get comfortable.  Use pillows as needed to support your arms and baby.  Hold baby close at the level of your breast, facing you in a tummy to tummy position.  Skin to skin helps with this.  Position the baby with his or her nose by the nipple.  There should be a straight line from baby s ear to shoulder to hips.  Tickle your baby s lips or wait for baby to open mouth wide, bring baby to breast by leading with the chin.  Aim the nipple at the roof of baby s mouth.  A rapid sucking pattern is followed by longer, drawing pattern with occasional swallows heard.  When baby is correctly latched, your nipple and much of the areola are pulled well into baby s mouth.      Returning to work or school  Focus on a good start to breastfeeding.  Many women continue to provide breastmilk for their baby when they return to work or school.  Making plans about where to pump and store milk can make the transition go well.  Talk with other mothers who have also returned to work or school for tips and support.  Your employer s Human Resource department may be a resource as  well.     Returning to work or school: (continued)     babies can mean fewer  sick  days for you.    A quality breast pump will also save time and add comfort.  Check with your insurance prior to giving birth for breast pump coverage.  Many insurance companies include a pump within your benefits.    Wait until your baby is at least three weeks old to introduce a bottle for the first time.  Have someone besides you give the bottle.    Breastfeed when you are with your baby. Reserve your bottles of breast milk for when you are away.     Your breasts will need to be  emptied  either by your baby or a pump.  Plan to pump at least twice in an eight hour day.    If you cannot pump at work, continue breastfeeding at home. Any amount of breast milk is worth giving to your baby.    Formula feeding facts  If you are planning to use formula to feed your baby, you will want to make some preparations ahead of time. Talk to your doctor or nurse-midwife about what type of formula to use. Some are iron-fortified, meaning they have extra iron in them. You will want to purchase formula and bottles before your baby is born to be sure you are ready after you return from the hospital. The [facility] do not provide formula samples to take home.    Be sure to follow formula mixing directions closely. Regular milk in the dairy case at the grocery store should not be given to babies under 1 year old. Baby formula is sold in several forms including:    Ready-to-use. This is the most expensive, but no mixing is necessary.    Concentrated liquid. This is less expensive than ready-to-use and you mix with water.    Powder. This is the least expensive. You mix one level scoop of powdered formula with two ounces of water and stir well.    Most babies need 2.5 ounces of formula per pound of body weight each day. This means an 8-pound baby may drink about 20 ounces of formula a day; however, this is just an estimate. The most  important thing is to pay attention to your baby s cues.  If your baby is always fussy, needs more iron or has certain food allergies, your physician may suggest you change your baby s formula to a different kind.     How do I warm my baby s bottles?  You may feed your baby a bottle without warming it first. It is OK for the breast milk or formula to be cool or room temperature. If your baby seems to prefer it warmed, you can put the filled bottle in a container of warm water and let it stand for a few minutes. Check the temperature of the liquid on your skin before feeding it to your baby; to be sure it isn t too hot. Do not heat bottles in the microwave. Microwaves heat food and liquids unevenly, and this can cause hot spots that can burn your baby.    How do I clean and sterilize bottles?  Sterilize bottles and nipples before you use them for the first time. You can do this by putting them in boiling water for 5 minutes. After that first time, you can wash them in hot and soapy water. Rinse them carefully to be sure there is no soap left on them. You can also wash them in the .    [facility] Connection [phone] ([phone])    Baby Café    Pregnant and interested in breastfeeding?  Need answers to breastfeeding questions?  Want to help breastfeeding moms?  Already breastfeeding and want to meet other moms?    Join us at the Baby Café!    Baby Cafe is a free, drop-in service offering breast-feeding support for pregnant women, breast-feeding mothers and their families.  Come share tips and socialize with other mothers.  Babies and siblings are welcome (no childcare available).    Starting April 2018, Baby Café will be at 4 locations.  Please see below for the Baby Café closest to you!      [facility]  [address] [place] [address]  1st Wednesday: 10am-12pm    [address] [address]  3rd Wednesday 4-6pm    [facility]  [address]  "[place] [address]   10am-12:30pm    [facility]  [address] [place] [address]  : 4-6pm      Hmong, Belarusian, and Latvian which is may be available at some sites.    For more information, please contact: [email or web address] [email or web address]. or [phone]      Virtual Breastfeeding Support:    During this time of isolation, breastfeeding families need even more community!  Here are some area organizations offering virtual support groups for breastfeeding:      [facility] Support Group,  at 10:30 am   Run by GARRISON Cordoba of [employer]   Go to [employer] Facebook page and click on \"events\" for link   https://www.Pressi.[email or web address]    [facility] Milk Hour,  at 2:30 pm    Run by GARRISON Doty   Go to [facility] FB page and send message to get link   https://www.Pressi.Kognitio/healthfoundations/    [facility] holding virtual meetings the first Tuesday of each month, 8-9 pm, and the   Third Saturday, 10 - 11 am.  Go to [facility] and [facility] FB page; message to get link https://www.Pressi.Kognitio/LLLofGoldenValley/?hc_location=[facility]    [facility] offers a Lactation Lounge every Friday 12pm - 1pm, run by [facility] Leader   Sign up via link at Vioozer/cbe-lactation   https://www.Vioozer/cbe-lactation    [facility] [www.[facility].com] is offering virtual support groups every Monday, 10:30 am - 12 pm, run by nurse IBCLC   Https://www.facebook.com/events/[email or web address]/    Prenatal Breastfeeding Classes:        [facility] is offering virtual breastfeeding and  care classes:  https://www.Vioozer/education-workshops    BirthEd childbirth and breastfeeding education offering virtual prenatal breastfeeding " classes  https://www.Sompharmaceuticals.com/workshops

## 2021-06-17 NOTE — PROGRESS NOTES
Arlyn is here alone today for routine prenatal care at 38w1d. Reports no concerns today. Notes active FM. Had a round of cramping/tightenings last night but was able to fall asleep and they resolved on their own. Discussed early vs active labor and when to call with positive GBS status. Reviewed warning s/sx and reasons to call. RTC 1 week.

## 2021-06-17 NOTE — PROGRESS NOTES
Arlyn is here for LUISA. Feeling well, report normal FM and denies UC's.  Reports normal vaginal discharge, denies LOF.  Reviewed induction parameters, Arlyn asking about IOL prior to 41 weeks. Discussed hospital parameters for elective IOL.  Desires cervical exam today, 1cm/50%/-2/post/medium.  Asked about membrane sweeping, discussed potential risks associated with GBS status. Membrane sweeping not performed.  Reviewed s/sx PEC denies HA, visual changes or epigastric pain.  Reviewed danger s/sx appropriate for this gestational age. Reviewed how to contact CNM on call.  Return to clinic in one week, will discuss IOL for 41 weeks at next visit if needed.

## 2021-06-17 NOTE — PROGRESS NOTES
Arlyn is here alone today for routine prenatal visit at 37w1d. Reports feeling well, felt this week went by quickly. Reports active FM and no regular UCs. We reviewed her labs from last visit; discussed GBS positive status and recommendation for prophylaxis, advised on timing to present to hospital in labor or with SROM. Discussed PP care, length of stay and answered questions. Planning a low key Mother's day. Encouraged activity, exercise and stretching to support her body at end of pregnancy and facilitate optimal fetal positioning. Reviewed her blood pressure continues as stable but did review warning s/sx of preeclampsia and when to call as well as with decreased FM, leaking fluid or labor. RTC 1 week.

## 2021-06-18 ENCOUNTER — ANESTHESIA - HEALTHEAST (OUTPATIENT)
Dept: SURGERY | Facility: CLINIC | Age: 36
End: 2021-06-18

## 2021-06-18 ENCOUNTER — RECORDS - HEALTHEAST (OUTPATIENT)
Dept: ADMINISTRATIVE | Facility: OTHER | Age: 36
End: 2021-06-18

## 2021-06-18 ENCOUNTER — SURGERY - HEALTHEAST (OUTPATIENT)
Dept: SURGERY | Facility: CLINIC | Age: 36
End: 2021-06-18
Payer: COMMERCIAL

## 2021-06-18 ENCOUNTER — TRANSFERRED RECORDS (OUTPATIENT)
Dept: HEALTH INFORMATION MANAGEMENT | Facility: CLINIC | Age: 36
End: 2021-06-18

## 2021-06-18 ENCOUNTER — COMMUNICATION - HEALTHEAST (OUTPATIENT)
Dept: SCHEDULING | Facility: CLINIC | Age: 36
End: 2021-06-18

## 2021-06-18 LAB
CREAT SERPL-MCNC: 0.78 MG/DL (ref 0.6–1.1)
GFR SERPL CREATININE-BSD FRML MDRD: >60 ML/MIN/1.73M2
GLUCOSE SERPL-MCNC: 129 MG/DL (ref 70–125)
INR PPP: 1.04 (ref 0.9–1.1)
POTASSIUM SERPL-SCNC: 3.9 MMOL/L (ref 3.5–5)

## 2021-06-18 ASSESSMENT — MIFFLIN-ST. JEOR
SCORE: 1430.24
SCORE: 1430.24

## 2021-06-24 NOTE — TELEPHONE ENCOUNTER
Previous clinic: [facility] appointment date & location: 3/26 @ [facility]  Weeks gestation at appointment: 24 wks  How will records be sent to the clinic?: Faxed to [facility]  Phone number where you may be reached: [phone]

## 2021-06-24 NOTE — PROGRESS NOTES
"Please see \"Imaging\" tab under Chart Review for full report.  This ultrasound was performed in the [facility], and may be located under Care Everywhere.    Ayanna Mota MD  Maternal Fetal Medicine    "

## 2021-06-25 NOTE — TELEPHONE ENCOUNTER
PHONE CALL    Arlyn is a 35YO  who paged this evening reporting UCs. She reports that while walking around running errands today she notices contractions that got pretty regular and very noticeable. She took a shower and laid down and they dissipated. She denies LOF and endorses regular fetal movement.   Discussed options for sleep and rest tonight, and she desires to stay home until in labor. She confirms she will call back with regular contractions (5 mins apart for 1 hour), and will try to sleep tonight.  Pt instructed to CB with LOF, HA or other needs and questions.     Patient was called by student, BRETT Angel, who was present for learning.  I personally assessed and made clinical decisions reflected in the documentation.     Ina Kevin CNM

## 2021-06-25 NOTE — TELEPHONE ENCOUNTER
JENNIFER received here with no records  JENNIFER faxed to [facility] at [phone]

## 2021-06-25 NOTE — TELEPHONE ENCOUNTER
TELEPHONE CALL:  Arlyn is a  who is one week PP from PCS birth for arrest of descent. Calls with report of shortness of breath for the last couple of hours. Also noting that it feels like there is a brick on her chest. She was wondering if it was anxiety but it hasn't gone away. Denies chest pain, cough, rapid heart rate, or skipped beats. She has had a history of mild anxiety but has not used medication for it in the past. She states it does seem like she needs to use extra chest muscles to help her breathe.  It does not appear that she received Lovenox during her hospital admission.  She did have significant blood loss and PPH requiring blood transfusion two days in a row prior to discharge.  Her breaths are audible as she is speaking to me with slight pausing during our conversation.  Advised to go to ER at this time for evaluation given higher risk for PE. Other differentials include anxiety, anemia effect, or other.  She agrees to this and is able to do this now.    Ina Kevin CNM

## 2021-06-25 NOTE — TELEPHONE ENCOUNTER
Records received without pregnancy episode on 3-15-19  Pregnancy episode requested but not here yet    Received records given to CNM to review

## 2021-06-25 NOTE — PROGRESS NOTES
Transfer of care Visit    Transfer of care from [facility]  Number of visits IOB + 3 STEPHANIE Johnson name Tan  Years of education 18  Initial visit date 12/17/2018 at 10 weeks 4 days  LMP 10/4/2018, EDC 7/11/2019  Dating ultrasound done on 12/7/2018 at 9 weeks 1 day gestation size equals dates  Anatomic screen ultrasound done 2/20/2019 results cardiac arch is not well visualized sent to [facility].  2/27/2019 level 2 ultrasound at [facility] within normal limits.  Pre-preg wt 164 at IOP  Initial OB hemoglobin 13.5  Initial platelets  303  UC mixed racheal  Negative GC chlamydia  Pap on 2 2318- with negative HPV  HIV nonreactive  Rubella equivocal  RPR nonreactive  HBsAg on 11/27/2018 nonreactive  Risks, progress thus far: Abnormal ultrasound  (cardiac structures not well visualized) so was sent to [facility] for level 2.  Looking for cardiac issues, normal result.    Reason for transfer: wants to deliver at [facility]  Plan: Oriented to [facility] midwives, given IOB folder, reviewed phone numbers and practice philosophies.  Education done for 24 weeks and NV will include 1 hour glucose, hemoglobin, RPR, urine    Today pt presents to [facility] by herself. She is filling out all of the new pt paperwork which I will review in detail.  Heartburn disc with relief measures disc including Tums, mylanta, zantac, sleeping with be on a slant.  Baby active.  Pt exercises regularly    Patient occupation: Geriatric care coordinator for [employer]  Medications: PNV, DHA, probiotic and zantac  (Recommended calcium and vitamin D as well.)  See history section for past medical history, surgical history, and family medical history.  General health/lifestyle:   Patient states she  always wears a seatbelt.  There are firearms in the home and they are stored safely  There are working fire detectors in the home.  No smoking or tobacco use.   In a typical week, exercise includes: 3-4 days a week with cycling, walking, and lifting weights.  Recommended  only 25 pound weight limit.  Diet: Fairly well-balanced.(Discussed high protein lower carb and healthy fat diet with lots of good fruits and veggies)  In a typical week she drinks no alcoholic drinks.  No recreational drug use.   No physical, sexual or emotional abuse.   She is in a safe, monogamous relationship with her .  Sexual history/family planning: Desired pregnancy and or able to conceive right away.  For birth control she uses: In the past patient has used OCPs and condoms. is currently pregnant now.  Patient is  sexually active. In the last 3 months she has had one partner, in the last 12 months she has had one partner.  Types of sexual activity practiced: Vaginal  Age at first menses: Patient is unsure but thinks age 14.  Last menstrual period: 10/4/2018  Her periods come every 28 days lasting 5 days.  They are usually moderate  flow.  PMS includes breast tenderness, mood swings, bloating, acne.  Patient does not want STI testing today, but discussed that for prenatal screening it is usually done in pregnancy at the first visit.  Patient already had these tests run at [facility].  Review of systems: Noncontributory other than normal pregnancy complaints as listed above.    IVA Gao, NAT  45 min TT with pt. Greater than 50% of time spent with patient was counseling, education and coordination of care.

## 2021-06-26 NOTE — PROGRESS NOTES
Hospital Follow-up Visit:    Assessment/Plan:     1. Elevated blood pressure reading without diagnosis of hypertension    This seems to be fully resolved now, and she is not needed to use the lisinopril that they gave her in the hospital.  Her blood pressures have all been quite in a good range over the last day or so, so I think were pretty reassured that this issue has resolved that she can continue to keep an eye on it once in a while.    2. Iron deficiency anemia, unspecified iron deficiency anemia type    Since she had the  section delivery and it was a little low in the hospital, we went ahead and checked the CBC again today along with a basic profile to follow-up with her and the results when they become available.      - HM1(CBC and Differential)  - Basic Metabolic Panel    3. Dyspnea, unspecified type    This is also completely resolved and we can just keep an eye on her symptoms again going forward.       Subjective:     Arlyn Escalera is a 36 y.o. female who presents for a hospital discharge follow up.      Hospital/Nursing Home/IP Rehab Facility: [facility]  Date of Admission: 6/10/2021  Date of Discharge:2021  Reason(s) for Admission:SOB            Do you have any problems taking your medication regularly?  None       Have you had any changes in your medication since discharge? None       Have you had any difficulty following your discharge or treatment plan?  No    Summary of hospitalization:  Hospital discharge summary reviewed  Diagnostic Tests/Treatments reviewed.  Follow up needed: None  Other Healthcare Providers Involved in Patient's Care: Patient Care Team:  Nilam Conde CNP as PCP - Nadira Edwards APRN, CNM as Assigned OBGYN Provider      Update since discharge: {improved   Information from family, SNF, care coordination:      Post Discharge Medication Reconciliation: Post Discharge Medication Reconciliation Status: discharge medications reconciled,  "continue medications without change  Plan of care communicated with: patient     Objective:     Vitals:    06/15/21 1322   BP: 122/80   Pulse: 95   SpO2: 97%   Weight: 177 lb 5 oz (80.4 kg)   Height: 5' 3.25\" (1.607 m)   LMP: 08/19/2020         Physical Exam:    General: Awake, Alert and Cooperative   Head: Normocephalic and Atraumatic   Eyes: PERRL, EOMI.   ENT: Normal pearly TMs bilaterally and Oropharynx clear   Neck: Supple and Thyroid without enlargement or nodules   Chest: Chest wall normal   Lungs: Clear to auscultation bilaterally   Heart:: Regular rate and rhythm and no murmurs.  No significant LE edema.   Abdomen: Soft, nontender, nondistended and no hepatosplenomegaly   Musculoskeletal: Moving all extremities and No pain in the extremities   Neuro: Alert and oriented times 3 and Grossly normal   Skin: No rashes or lesions noted           Coding guidelines for this visit:  Type of Medical   Decision Making Face-to-Face Visit       within 7 Days of discharge Face-to-Face Visit        within 14 days of discharge   Moderate Complexity [id number]   High Complexity [id number]       Electronically signed by Anibal Cruz MD 06/18/21 6:42 AM     "

## 2021-06-26 NOTE — ANESTHESIA PREPROCEDURE EVALUATION
Anesthesia Evaluation      Patient summary reviewed   No history of anesthetic complications     Airway   Mallampati: I  Neck ROM: full   Pulmonary - negative ROS and normal exam                          Cardiovascular - negative ROS and normal exam   Neuro/Psych - negative ROS     Endo/Other - negative ROS   (+) pregnant     GI/Hepatic/Renal - negative ROS           Dental - normal exam                        Anesthesia Plan  Planned anesthetic: epidural    ASA 2     Anesthetic plan and risks discussed with: patient and spouse

## 2021-06-26 NOTE — PATIENT INSTRUCTIONS - HE
"[facility] Nurse Midwives - Contact information:  Appointment line and to get a hold of CNM in clinic Monday-Friday 8 am - 5 pm:  [phone].  There are some clinics with early start times (1st appointment 7:40 am) and others with evening hours (last appointment 6:20 pm).  Most are typically open from 8 am to 5 pm.    CNM on call answering service: [phone].  Specify your hospital of choice and leave a brief message for CNM;  will then page CNM who is on call at your specified hospital and you should receive a call back with 15 minutes.  Be sure that your ringer is audible and that you can accept blocked calls so that we can get back in touch with you! This number should be reserved for urgent needs if during the day, before 8 am, after 5 pm, weekends, holidays.    Contact the on-call CNM with warning signs, such as:    vaginal bleeding     Vaginal discharge and itching or pain and burning during urination    Leg/calf pain or swelling on one side    severe abdominal pain    nausea and vomiting more than 4-5 times a day, or if you are unable to keep anything down    fever more than 100.4 degrees F.       Meet the Midwives from [facility]  You are invited to an informational meet and greet with [facility] Certified Nurse-Midwives. Our free \"Meet the Midwives\" event is a great opportunity to learn about our midwives' philosophy and experience, the hospitals where we can assist with your birth, and answer questions you may have. Partners, friends, and family are welcome to attend. Currently, this is a virtual event.  Date  First Tuesday of every month at 7 pm.    Link to next (live) meeting  https://www.[place].org/classes-and-events/meet-the-midwives-from-[facility]-clinics  To Join by Telephone (audio only) Call:   [phone] Phone Conference ID: [phone]#          Touring the Maternity Care Center  At this time we are offering a virtual tour of the " Maternity Care Centers at both [facility] and [facility]:   [facility]: https://www.Skippers.org/Locations/[facility]/Maternity-Care-Center  [facility]:   https://[facility] Point[facility].org/[facility]-care/the-birthplace/tours  https://www.[place].org/Locations/[facility]/Maternity-Care-Center/#virtual_tour  When in person tours become available, registrations is required. To schedule a tour at either [facility] or [facility], please do so online using the following links:  [facility] - https://www.[employer]./registerlist.asp?s=6&m=303&vs=5&p=2&jlqcs=142&ps=1&group=37&it=1&oyi=107  [facility] - https://www.[employer]./registerlist.asp?s=6&m=303&vs=5&p=2&rgrgn=445&ps=1&group=38&it=1&fzn=962    Childbirth and Parenting Education:     Free Video Series from [facility]: https://www.nursing.[facility]/laborandbirthvideos  [facility]: http://[facility] HospitalEyeQuant.Placeling/   [phone]  [place]: (education, yoga & wellness) www.[employer]eg[employer]  Enlightened Mama: www.TouchIN2 Technologiesenedmama.Placeling   Childbirth collective: (Parent topic nights)  www.childbirthcollective.org/  Hypnobabies:  www.hypnobabiestwincities.com/  Hypnobirthing:  Http://hypnobirthing.com/  The Birth Hour: https://theACLEDA BankhouOxford [employer]/online-childbirth-class/    APPS and Podcasts:   Bertha Sanders  The Birth Hour (for birth stories)   Birthful   Expectful   The Longest Shortest Time  PregnancyPodcast Haylee Dodge    Breastfeeding Information:  An excellent 15-minute video on preparing for a good breastfeeding experience, including how to ensure you have a good milk supply and a comfortable latch, can be found here:  https://firstdroplets.com/      Book Recommendations:   Dilia Sterling's Birthing From Within--first few chapters include a new-age tone, you may prefer to skip it and keep going, because there is good stuff later.  This book recommendation covers emotional preparation, but does cover  "coping with pain, and use of both pharmacological and nonpharmacological methods.    Dr. Dowd' The Pregnancy Book and The Birth Book--the pregnancy book goes month-by month    Womanly Art of Breastfeeding by La Leche League International   Breastfeeding by Marivel Steele--great pictures    Mothering Your Nursing Toddler, by Jackie Chun.   Addresses dealing with so many of the challenging behaviors of a nursing toddler.  How Weaning Happens, by La Leche League.  Discusses weaning at all ages, from medically necessary weaning of an infant, all the way up to age 5 (or older), with why/why not, and strategies.  Very empowering book both for deciding to wean and deciding not to.    American College of Nurse-Midwives (ACNM) http://www.midwife.org/; look at the informational handouts at http://www.midwife.org/Share-With-Women     www.mymidwife.org    Mother to Baby (Medication and Herbal guidance in pregnancy): http://www.mothertobaby.org  Toll-Free Hotline: 585.811.9462  LactMed (Medication use while breastfeeding): http://toxnet.nlm.nih.gov/newtoxnet/lactmed.htm    Women's Health.gov:  http://www.womenshealth.gov/a-z-topics/index.html    American pregnancy association - http://americanpregnancy.org    Centering Pregnancy (group prenatal care option): http://centeringhealthcare.org    Information about doulas:  Childbirth collective: http://www.childbirthcollective.org/  Doulas of [place] (SHELLIE):  www.shellie.org  [facility]  project: http://twincitiesdoulaproject.com/     Early Childhood and Family Education (ECFE):  ECFE offers parents hands-on learning experiences that will nourish a lifetime of teachable moments.  http://ecfe.info/ecfe-home/    March of Dimes www."[employer]." - Nutrition  www.mypyramid.gov  Under \"For Consumers,\" click on \"pregnant and breastfeeding women.\"      Centers for Disease Control and Prevention (CDC) - Vaccines : http://www.cdc.gov/vaccines/       When " researching information on the web, question the validity of websites.  [employer] .gov, .edu and.org tend to be more reliable information.  If there are a lot of advertisements, be cautious of the information provided. Stay away from blogs and chat rooms please!     [place] Screening Program  Http://www.health.[facility].mn.us/newbornscreening/  [place] newborns are tested soon after birth for more than 50 hidden, rare disorders, including hearing loss and critical congenital heart disease (CCHD). This site provides resources and information for families and providers.    HEALTHY PREGNANCY CARE: 37 to 41 WEEKS PREGNANT    Talk with your midwife or physician about when to call with signs of labor    Regular uterine contractions that are getting closer together and/or stronger    If you think your water has broken or is leaking    Bleeding from the vagina like a period (bloody vaginal discharge is normal)    If you are not feeling your baby move    Make plans for transportation and  as needed for when you are going to the hospital.    Your midwife or physician may offer to check your cervix for changes.     Ask your health care provider about vaccinations you may need following delivery. By now, you should have received a Tdap immunization to protect against pertussis or whooping cough. Fathers and family members who will be in close contact with the baby should also receive a Tdap shot at least two weeks before the expected birth of the baby if they have not had a Td (tetanus) shot for at least two years.    If you are past your due date, discuss the next steps leading to delivery with your midwife or physician. If you don't start labor on your own by 41 or 42 weeks, your midwife or physician may recommend giving you medicines to ripen your cervix and start labor.  Induction of labor:  http://onlinelibrary.banks.com/store/10.1016/j.jmwh.2008.04.018/asset/j.jmwh.2008.04.018.pdf?v=1&t=cznq0nda&h=72dk933o2an79m68d3n6zi1d366017a1uk0pf229    Preparing for your baby: Tell your midwife or physician how you plan to feed your baby (breast or bottle), who you have chosen to do pediatric care for your baby, and if you have a boy, whether you have chosen to have him circumcised. You will need a car seat correctly installed in your vehicle to bring your baby home. As you start to set up the nursery at home for your baby, make sure the crib is safe. The mattress needs to fit snugly against the edges of the crib. If you can fit a soda can between the bars, they are too far apart and can allow the baby's head to caught between them.    Learn about infant care and feeding, including information about infant CPR. We recommend that you put your baby to sleep on his or her back to reduce the chance of Sudden Infant Death Syndrome (SIDS). To maintain a healthy environment in which your child can grow, it's best to keep your home smoke-free. By preparing ahead, your transition into parenthood will go smoothly for you and your baby.    Your midwife or physician will want to see you for a checkup 2 to 6 weeks after delivery.    If you have questions about any symptoms you are experiencing or any other concerns, call your provider or their clinic staff at [facility] at Phone: [phone]. If it's after clinic hours, physician patients should call the Care Connection at [phone] ([phone]); midwife patients should call their answering service at [phone].    How can you care for yourself at home?   You can refer to the Starting Out Right book or find it online at http://www.healtheast.org/images/stories/maternity/[facility]-Starting-Out-Right.pdf or http://www.healtheast.org/images/stories/flipbooks/[facility]-starting-out-right/[facility]-starting-out-right.html#p=8     You can sign  up for a weekly parenting e-mail that gives support, tips and advice from health care professionals that starts with pregnancy and continues through the toddler years. To register, go to www.[facility].org/baby at any time during your pregnancy.        Making Plans for Feeding My Baby    By this point, you probably have read a lot about feeding your baby.  Breastfeed or formula? Each mother s decision is her own and [facility] respects you and your choices. We ve gathered information on both breastfeeding and formula feeding to help with your decision. Talking with your physician or nurse-midwife can also help in your decision.  However you plan to feed your baby, [facility] Maternity Care Centers encourage rooming in with your baby, skin-to-skin contact and feeding your baby based on his or her cues.    Skin-to-skin contact  Being close to mom helps your baby adjust to life outside of the womb.  It helps your baby regulate their body temperature, heart rate, and breathing.  Your baby will usually be placed skin-to-skin immediately following birth or as soon as possible, if medical intervention is needed.    Rooming-In  Having your baby stay with you in your room is called  rooming-in .  Keeping your baby in your room helps you to learn how to care for your baby by getting to know your baby s cues, body rhythms and sleep cycle.       Cue-based feeding  Cues (signals) are baby s way of telling you what he or she wants.  When you learn your infant s cues, you know how to care for and feed your baby.   Feeding cues are the licking and smacking of lips, bringing their fist to their mouth, and a reflex called  rooting - where baby turns and opens his or her mouth, searching for the breast or bottle.  Crying is a late feeding cue.  Babies can feed frequently, often at least 8 times in 24 hours.    Breastfeeding facts  Breast milk is the best source of nutrition for your baby and is available at birth. In the first couple  of days, your milk volume is already starting to increase, though it may not be noticeable. Breastfeed frequently to increase your milk supply. Within three to five days, you will begin to notice larger milk volumes. An increase in breast size, heaviness and firmness are often described as the milk  coming in.  Frequent breastfeeding can help breasts from getting overly firm and painful. You will know the baby is getting enough milk if your baby is having wet and dirty diapers and gaining weight.     If your goal is to exclusively breastfeed, it is important to not use any formula or artificial nipples (including bottles and pacifiers) while your baby is learning to breastfeed.  While it may seem like an  easy  option to give your baby a bottle, formula should only be given if there is a medical reason for your baby to have it.      Positioning and attachment   Get comfortable.  Use pillows as needed to support your arms and baby.  Hold baby close at the level of your breast, facing you in a tummy to tummy position.  Skin to skin helps with this.  Position the baby with his or her nose by the nipple.  There should be a straight line from baby s ear to shoulder to hips.  Tickle your baby s lips or wait for baby to open mouth wide, bring baby to breast by leading with the chin.  Aim the nipple at the roof of baby s mouth.  A rapid sucking pattern is followed by longer, drawing pattern with occasional swallows heard.  When baby is correctly latched, your nipple and much of the areola are pulled well into baby s mouth.      Returning to work or school  Focus on a good start to breastfeeding.  Many women continue to provide breastmilk for their baby when they return to work or school.  Making plans about where to pump and store milk can make the transition go well.  Talk with other mothers who have also returned to work or school for tips and support.  Your employer s Human Resource department may be a resource as  well.     Returning to work or school: (continued)     babies can mean fewer  sick  days for you.    A quality breast pump will also save time and add comfort.  Check with your insurance prior to giving birth for breast pump coverage.  Many insurance companies include a pump within your benefits.    Wait until your baby is at least three weeks old to introduce a bottle for the first time.  Have someone besides you give the bottle.    Breastfeed when you are with your baby. Reserve your bottles of breast milk for when you are away.     Your breasts will need to be  emptied  either by your baby or a pump.  Plan to pump at least twice in an eight hour day.    If you cannot pump at work, continue breastfeeding at home. Any amount of breast milk is worth giving to your baby.    Formula feeding facts  If you are planning to use formula to feed your baby, you will want to make some preparations ahead of time. Talk to your doctor or nurse-midwife about what type of formula to use. Some are iron-fortified, meaning they have extra iron in them. You will want to purchase formula and bottles before your baby is born to be sure you are ready after you return from the hospital. The [facility] do not provide formula samples to take home.    Be sure to follow formula mixing directions closely. Regular milk in the dairy case at the grocery store should not be given to babies under 1 year old. Baby formula is sold in several forms including:    Ready-to-use. This is the most expensive, but no mixing is necessary.    Concentrated liquid. This is less expensive than ready-to-use and you mix with water.    Powder. This is the least expensive. You mix one level scoop of powdered formula with two ounces of water and stir well.    Most babies need 2.5 ounces of formula per pound of body weight each day. This means an 8-pound baby may drink about 20 ounces of formula a day; however, this is just an estimate. The most  important thing is to pay attention to your baby s cues.  If your baby is always fussy, needs more iron or has certain food allergies, your physician may suggest you change your baby s formula to a different kind.     How do I warm my baby s bottles?  You may feed your baby a bottle without warming it first. It is OK for the breast milk or formula to be cool or room temperature. If your baby seems to prefer it warmed, you can put the filled bottle in a container of warm water and let it stand for a few minutes. Check the temperature of the liquid on your skin before feeding it to your baby; to be sure it isn t too hot. Do not heat bottles in the microwave. Microwaves heat food and liquids unevenly, and this can cause hot spots that can burn your baby.    How do I clean and sterilize bottles?  Sterilize bottles and nipples before you use them for the first time. You can do this by putting them in boiling water for 5 minutes. After that first time, you can wash them in hot and soapy water. Rinse them carefully to be sure there is no soap left on them. You can also wash them in the .    [facility] Connection [phone] ([phone])    Baby Café    Pregnant and interested in breastfeeding?  Need answers to breastfeeding questions?  Want to help breastfeeding moms?  Already breastfeeding and want to meet other moms?    Join us at the Baby Café!    Baby Cafe is a free, drop-in service offering breast-feeding support for pregnant women, breast-feeding mothers and their families.  Come share tips and socialize with other mothers.  Babies and siblings are welcome (no childcare available).    Starting April 2018, Baby Café will be at 4 locations.  Please see below for the Baby Café closest to you!      [facility]  [address] [place] [address]  1st Wednesday: 10am-12pm    [address] [address]  3rd Wednesday 4-6pm    [facility]  [address] [facility]  "[place] [address]   10am-12:30pm    [facility]  [address] [place] [address]  : 4-6pm      Hmong, Portuguese, and [place]an which is may be available at some sites.    For more information, please contact: [email or web address]. or [phone]      Virtual Breastfeeding Support:    During this time of isolation, breastfeeding families need even more community!  Here are some area organizations offering virtual support groups for breastfeeding:      [facility] Support Group,  at 10:30 am   Run by GARRISON Cordoba of [employer] [facility]   Go to [employer] Facebook page and click on \"events\" for link   [email or web address]://[email or web address]    [facility] Milk Hour,  at 2:30 pm    Run by GARRISON Doty   Go to [facility] FB page and send message to get link   https://www.[employer].Vinogusto.com/healthfoundations/    [facility] holding virtual meetings the first Tuesday of each month, 8-9 pm, and the   Third Saturday, 10 - 11 am.  Go to [facility] FB page; message to get link https://www.[employer].Vinogusto.com/LLLofGoldenValley/?hc_location=[facility]    [facility] offers a Lactation Lounge every Friday 12pm - 1pm, run by [facility] Leader   Sign up via link at [employer]/cbe-lactation   https://www.[employer]/cbe-lactation    [facility] is offering virtual support groups every Monday, 10:30 am - 12 pm, run by nurse IBCLC   Https://www.[email or web address]/events/[email or web address]/    Prenatal Breastfeeding Classes:        [facility] is offering virtual breastfeeding and  care classes:  https://www.[employer]/education-workshops    [employer]irth[employer] childbirth and breastfeeding education offering virtual prenatal breastfeeding " classes  https://www.[place] [employer].com/workshops

## 2021-06-26 NOTE — ANESTHESIA CARE TRANSFER NOTE
Last vitals:   Vitals:    06/18/21 0913   BP: 107/51   Pulse: 87   Resp: 17   Temp: 37.1  C (98.7  F)   SpO2: 100%     Patient's level of consciousness is drowsy  Spontaneous respirations: yes  Maintains airway independently: yes  Dentition unchanged: yes  Oropharynx: oropharynx clear of all foreign objects    QCDR Measures:  ASA# 20 - Surgical Safety Checklist: WHO surgical safety checklist completed prior to induction    PQRS# 430 - Adult PONV Prevention: 4558F - Pt received => 2 anti-emetic agents (different classes) preop & intraop  ASA# 8 - Peds PONV Prevention: NA - Not pediatric patient, not GA or 2 or more risk factors NOT present  PQRS# 424 - Veronica-op Temp Management: 4559F - At least one body temp DOCUMENTED => 35.5C or 95.9F within required timeframe  PQRS# 426 - PACU Transfer Protocol: - Transfer of care checklist used  ASA# 14 - Acute Post-op Pain: ASA14B - Patient did NOT experience pain >= 7 out of 10

## 2021-06-26 NOTE — ANESTHESIA POSTPROCEDURE EVALUATION
Discussion/Summary   Diabetes test is still excellent.  Cholesterol is good.  Kidney function is normal.   LAD        Verified Results  COMP METABOLIC PNL 26Sep2018 07:34AGILLES BUTT     Test Name Result Flag Reference   SODIUM 141 mmol/L  135-145   POTASSIUM 3.5 mmol/L  3.4-5.1   CHLORIDE 104 mmol/L     CARBON DIOXIDE 29 mmol/L  21-32   ANION GAP 12 mmol/L  10-20   GLUCOSE 116 mg/dl H 65-99   BUN 14 mg/dl  6-20   CREATININE 0.95 mg/dl  0.67-1.17   GFR EST.AFRICAN AMER >90     eGFR results = or >90 mL/min/1.73m2 = Normal kidney function.   GFR EST.NONAFRI AMER >90     eGFR results = or >90 mL/min/1.73m2 = Normal kidney function.   BUN/CREATININE RATIO 15  7-25   BILIRUBIN TOTAL 0.5 mg/dl  0.2-1.0   GOT/AST 17 Units/L  <38   ALKALINE PHOSPHATASE 75 Units/L     ALBUMIN 3.7 g/dl  3.6-5.1   TOTAL PROTEIN 7.0 g/dl  6.4-8.2   GLOBULIN (CALCULATED) 3.3 g/dl  2.0-4.0   A/G RATIO 1.1  1.0-2.4   CALCIUM 8.7 mg/dl  8.4-10.2   GPT/ALT 46 Units/L  <79   FASTING STATUS NA hrs       HEMOGLOBIN A1C GLYCOSYLATED 26Sep2018 07:34AGILLES BUTT     Test Name Result Flag Reference   HEMOGLOBIN A1C GLYH 6.3 % H 4.5-5.6   ----DIABETIC SCREENING---  NON DIABETIC                 <5.7%  INCREASED RISK                5.7-6.4%  DIAGNOSTIC FOR DIABETES      >6.4%     ----DIABETIC CONTROL---     A1C%           eAG mg/dL  6.0            126  6.5            140  7.0            154  7.5            169  8.0            183  8.5            197  9.0            212  9.5            226  10.0           240     LIPID PNL [id number] 07:34AGILLES BUTT     Test Name Result Flag Reference   FASTING STATUS NA hrs     CHOLESTEROL 153 mg/dl  <200   Desirable            <200  Borderline High      200 to 239  High                 >=240   HDL CHOLESTEROL 43 mg/dl  >39   Low            <40  Borderline Low 40 to 49  Near Optimal   50 to 59  Optimal        >=60   TRIGLYCERIDES 102 mg/dl  <150   Normal                   <150  Borderline  Patient: Arlyn Escalera  Procedure(s):   SECTION  Anesthesia type: epidural    Patient location: Labor and Delivery  Last vitals:   Vitals Value Taken Time   /73 21 0453   Temp 37  C (98.6  F) 21 0231   Pulse 103 21 0604   Resp 19 21 0231   SpO2 98 % 21 0604   Vitals shown include unvalidated device data.  Post vital signs: stable  Level of consciousness: awake and responds to simple questions  Post-anesthesia pain: pain controlled  Post-anesthesia nausea and vomiting: no  Pulmonary: unassisted, return to baseline  Cardiovascular: stable and blood pressure at baseline  Hydration: adequate  Anesthetic events: no    QCDR Measures:  ASA# 11 - Veronica-op Cardiac Arrest: ASA11B - Patient did NOT experience unanticipated cardiac arrest  ASA# 12 - Veronica-op Mortality Rate: ASA12B - Patient did NOT die  ASA# 13 - PACU Re-Intubation Rate: ASA13B - Patient did NOT require a new airway mgmt  ASA# 10 - Composite Anes Safety: ASA10A - No serious adverse event    Additional Notes:Pt s/p . Pain well controlled. Denies headache, nausea/vomiting. Pt ambulating and voiding with no issues. Motor function returned to baseline. No apparent anesthesia related complications. All questions and concerns answered.               High          150 to 199  High                     200 to 499  Very High                >=500   LDL CHOLESTEROL (CALCULATED) 90 mg/dl  <130   OPTIMAL               <100  NEAR OPTIMAL          100-129  BORDERLINE HIGH       130-159  HIGH                  160-189  VERY HIGH             >=190   NON-HDL CHOLESTEROL 110 mg/dl     Therapeutic Target:  CHD and risk equivalents <130  Multiple risk factors    <160  0 to 1 risk factors      <190   CHOLESTEROL/HDL RATIO 3.6  <4.5

## 2021-06-26 NOTE — ANESTHESIA CARE TRANSFER NOTE
Last vitals:   Vitals:    06/03/21 0007   BP: 94/55   Pulse: 84   Resp: 16   Temp: 37  C (98.6  F)   SpO2: 96%     Patient's level of consciousness is awake  Spontaneous respirations: yes  Maintains airway independently: yes  Dentition unchanged: yes  Oropharynx: oropharynx clear of all foreign objects    QCDR Measures:  ASA# 20 - Surgical Safety Checklist: WHO surgical safety checklist completed prior to induction    PQRS# 430 - Adult PONV Prevention: 4558F-8P - Pt did NOT receive => 2 anti-emetic agents  ASA# 8 - Peds PONV Prevention: NA - Not pediatric patient, not GA or 2 or more risk factors NOT present  PQRS# 424 - Veronica-op Temp Management: 4559F - At least one body temp DOCUMENTED => 35.5C or 95.9F within required timeframe  PQRS# 426 - PACU Transfer Protocol: - Transfer of care checklist used  ASA# 14 - Acute Post-op Pain: ASA14B - Patient did NOT experience pain >= 7 out of 10

## 2021-06-26 NOTE — ANESTHESIA POSTPROCEDURE EVALUATION
Patient: Arlyn Escalera  Procedure(s):  DILATION AND CURETTAGE, UTERUS, USING SUCTION, WITH ULTRASOUND GUIDANCE  Anesthesia type: MAC    Patient location: PACU  Last vitals:   Vitals Value Taken Time   /60 06/18/21 1000   Temp 36.5  C (97.7  F) 06/18/21 1000   Pulse 81 06/18/21 1000   Resp 16 06/18/21 1000   SpO2 96 % 06/18/21 1000     Post vital signs: stable  Level of consciousness: awake and responds to simple questions  Post-anesthesia pain: pain controlled  Post-anesthesia nausea and vomiting: no  Pulmonary: unassisted, return to baseline  Cardiovascular: stable and blood pressure at baseline  Hydration: adequate  Anesthetic events: no    QCDR Measures:  ASA# 11 - Veronica-op Cardiac Arrest: ASA11B - Patient did NOT experience unanticipated cardiac arrest  ASA# 12 - Veronica-op Mortality Rate: ASA12B - Patient did NOT die  ASA# 13 - PACU Re-Intubation Rate: NA - No ETT / LMA used for case  ASA# 10 - Composite Anes Safety: ASA10A - No serious adverse event    Additional Notes:

## 2021-06-26 NOTE — ANESTHESIA PROCEDURE NOTES
Epidural Block    Patient location during procedure: OB  Time Called: 6/2/2021 4:21 PM  Reason for Block:labor epidural  Staffing:  Performing  Anesthesiologist: Natali Naraaynan MD  Preanesthetic Checklist  Completed: patient identified, risks, benefits, and alternatives discussed, timeout performed, consent obtained, airway assessed, oxygen available, suction available, emergency drugs available and hand hygiene performed  Procedure  Patient position: sitting  Prep: ChloraPrep  Patient monitoring: heart rate, blood pressure and continuous pulse oximetry  Approach: midline  Location: L3-L4  Injection technique: RADHA saline  Number of Attempts:1  Needle  Needle type: Sarah   Needle gauge: 18 G     Catheter in Space: 5  Assessment  Sensory level:  No complications      Additional Notes:  R/B/A discussed with pt who wished to proceed. Single pass for crisp RADHA. Negative for heme. No parasthesias. Catheter threaded easily. Negative test dose.  Pt tolerated procedure well. No complications.

## 2021-06-26 NOTE — ANESTHESIA PREPROCEDURE EVALUATION
Anesthesia Evaluation      Patient summary reviewed   No history of anesthetic complications     Airway   Mallampati: I  Neck ROM: full   Pulmonary - negative ROS and normal exam                          Cardiovascular - negative ROS and normal exam   Neuro/Psych - negative ROS   (+) anxiety/panic attacks,     Endo/Other - negative ROS   (+) obesity (BMI 30),      GI/Hepatic/Renal - negative ROS           Dental - normal exam                        Anesthesia Plan  Planned anesthetic: MAC    Patient has post partum hemorrhage.  Hgb 9.3  NPO since midnight.    ASA 2 - emergent   Induction: intravenous   Anesthetic plan and risks discussed with: patient    Post-op plan: routine recovery

## 2021-06-26 NOTE — PROGRESS NOTES
"Arlyn presents to [facility] for her 2 wk PP visit.  Her C-sec birth occurred 6/3/21 for arrest of descent. PPH with transfusion of 3 units PRBC. (lowest hgb 6.1, at discharge was 8.7)    She was seen in the ER on 6/10/21 for slightly elevated BP and shortness of breath.  Given lasix and BP meds, kept overnight.  Home with instructions for watching BP--has been WNL  F/U with OB--tried calling, will do today, make an appt    Baby: \"Anali\"  Baby feeding: Just bottle feeding  Feelings about her birth: happy with her care, but \"not ideal\" because of blood loss and C-sec  Sleep: well, baby up only once to eat  Eating/appetite: yes/yes  Mood: good  Bleeding: normal, normal for PP course  Healing: well, no issues at wound site  Help at home adequate: yes,  is helpful, he still off work, and other child goes to day care   Plans Mirena IUD    A: Normal 2 wk PP exam, incision site CDI    P: RTC for 6 PP exam   Referrals  Other instructions    Total time spent on the date of this encounter doing: chart review, review of test results, patient visit, performing the physical exam, education, counseling, developing this plan of care, and documenting = 20 min  "

## 2021-07-03 NOTE — ADDENDUM NOTE
Addendum Note by Mónica Jones APRN, CNM at 11/4/2020  3:00 PM     Author: Mónica Jones APRN, CNM Service: -- Author Type: Midwife    Filed: 11/6/2020 12:35 PM Encounter Date: 11/4/2020 Status: Signed    : Mónica Jones APRN, CNM (Midwife)    Addended by: MÓNICA JONES on: 11/6/2020 12:35 PM        Modules accepted: Level of Service

## 2021-07-06 VITALS — HEIGHT: 63 IN | BODY MASS INDEX: 33.48 KG/M2 | BODY MASS INDEX: 33.48 KG/M2 | WEIGHT: 189 LBS

## 2021-07-06 VITALS
WEIGHT: 174.6 LBS | SYSTOLIC BLOOD PRESSURE: 116 MMHG | HEART RATE: 100 BPM | BODY MASS INDEX: 30.94 KG/M2 | DIASTOLIC BLOOD PRESSURE: 70 MMHG | HEIGHT: 63 IN

## 2021-07-06 VITALS
HEIGHT: 63 IN | WEIGHT: 170 LBS | WEIGHT: 170 LBS | BODY MASS INDEX: 30.11 KG/M2 | BODY MASS INDEX: 30.11 KG/M2 | BODY MASS INDEX: 30.11 KG/M2

## 2021-07-08 ASSESSMENT — ANXIETY QUESTIONNAIRES: GAD7 TOTAL SCORE: 0

## 2021-07-13 RX ORDER — IBUPROFEN 800 MG/1
800 TABLET, FILM COATED ORAL
COMMUNITY
Start: 2021-06-05 | End: 2021-07-15

## 2021-07-13 RX ORDER — ACETAMINOPHEN 325 MG/1
975 TABLET ORAL
COMMUNITY
Start: 2021-06-05 | End: 2021-07-15

## 2021-07-14 PROBLEM — O48.0 POST-TERM PREGNANCY, 40-42 WEEKS OF GESTATION: Status: RESOLVED | Noted: 2021-06-02 | Resolved: 2021-06-05

## 2021-07-14 PROBLEM — O13.9 GESTATIONAL HYPERTENSION AFFECTING EIGHTH PREGNANCY: Status: RESOLVED | Noted: 2019-06-26 | Resolved: 2019-06-28

## 2021-07-14 PROBLEM — O13.3 GESTATIONAL HYPERTENSION W/O SIGNIFICANT PROTEINURIA IN 3RD TRIMESTER: Status: RESOLVED | Noted: 2019-06-26 | Resolved: 2019-06-28

## 2021-07-14 PROBLEM — O09.40 GESTATIONAL HYPERTENSION AFFECTING EIGHTH PREGNANCY: Status: RESOLVED | Noted: 2019-06-26 | Resolved: 2019-06-28

## 2021-07-14 PROBLEM — Z34.90 PREGNANT: Status: RESOLVED | Noted: 2021-06-02 | Resolved: 2021-06-02

## 2021-07-14 PROBLEM — O13.3 GESTATIONAL HYPERTENSION, THIRD TRIMESTER: Status: RESOLVED | Noted: 2019-06-18 | Resolved: 2019-08-06

## 2021-07-14 PROBLEM — Z34.01 ENCOUNTER FOR SUPERVISION OF NORMAL FIRST PREGNANCY IN FIRST TRIMESTER: Status: RESOLVED | Noted: 2018-11-27 | Resolved: 2019-03-19

## 2021-07-14 NOTE — PATIENT INSTRUCTIONS
POSTPARTUM INFORMATION AND RESOURCES:    Congratulations on the birth of your baby. We have gathered together some information on staying healthy in the postpartum time including information on exercise, nutrition and mental health. We have also listed some local and national resources for lactation support and mental health support.    If you have questions or concerns and need to speak with a midwife immediately, please call the on-call midwife at [phone].    If you have a non-emergent question or would like to schedule a follow up appointment, please call the clinic midwife at [phone].    Thank you for sharing your birth experience with the [facility].  Congratulations on the birth of your baby!    ---------------------------------------------------------------------------------------------------------  EXERCISE & NUTRITION:     Getting and Staying Active: A Healthy You!   -Why should I exercise? Exercise, being physically active, is very important for all women. Being active can help you:   Lose or maintain weight   Have more energy   Sleep better   Enjoy sex more   Relieve stress and think better   Lower the chance you will have heart disease, high blood pressure, and diabetes   Strengthen your bones and muscles   Have fewer hot flashes if you are older   -How active do I have to be to get the bene?ts of exercise?  Studies show that as little as 15 minutes of moderate exercise--like fast walking or dancing--3 times a week can improve the health of your heart. Ifyou want to get the best benefits from exercise, try to increase your physical activity to at least 30 minutes, 5 times a week. If you have a serious health problem,be sure to talk with your health care provider before starting an exercise program.   Https://[email or web address]/  Http://www.OTOY/    @PelvicGuru1  @MyPelvicFloorMuscles  @The.Vagina.Whlesley    Taking Care of your Health: Health Care Maintenance Screening  recommendations   In all the things women do, taking care of everything and everybody else, they sometimes forget to take care of themselves. This handout reviews the health screenings and vaccines that are recommended for women of all ages. Talk with yourhealth care provider about which tests and vaccines you need. The chart below lists the screening tests and vaccinations recommended for healthy women who do not have a high risk for most diseases.   The recommendations in thischart are guidelines only. For some tests and vaccines, the chart says you should talk to your health care provider.This is because the best recommendations for you depend on your personal health history. Your health care provider may suggest more frequent testing or additional tests ifyou havea higher chance of getting some diseases     Planning Your Family: Developing a Reproductive Life Plan   Planning ahead can help you avoid getting pregnant when you don t want to be pregnant and also be in good health if and when you do decide to become pregnant. Many women have at least one pregnancy in their lives, even if it was not planned. In fact, about half of all pregnancies are not planned. Getting pregnant when you did not plan it can be a problem, or it can turn into a happy event. Planning pregnancy leads to healthier pregnancies, healthier mothers, and healthier families.   Although many women want to have a family, not everyone wants to have children. More and more women are childless by choice (also known as childfree). Whether to have children is a personal choice that only you can make. It s okay not to want children! If you never want to get pregnant, it is important to make sure you always use very effective birth control, such as an intrauterine device, the birth control implant, female sterilization (having your tubes tied), or your partner having a vasectomy.     Reliable resources:   ?   American College of Nurse-Midwives (ACNM)  "http://www.midwife.org/; look at the informational handouts at http://www.midwife.org/Share-With-Women   ??   Women's Health.gov:   http://www.womenshealth.gov/a-z-topics/index.html   FDA - Nutrition ?www.mypyramid.gov? Under \"For Consumers,\" click on \"pregnant and breastfeeding women.\"   ?   Vaccines : Centers for Disease Control and Prevention (CDC) http://www.cdc.gov/vaccines/   ?   [facility] www.Rockford[employer].Zapoint   ?   When researching information on the web, question the validity of websites.? The [employer] .gov, IPXI and.org tend to be more reliable information.? If there are a lot of advertisements, be cautious of the information provided. Stay away from blogs and chat rooms please!   ?   ?   Nutrition and supplements:   Daily multivitamin vitamin (with 400 - 1000 mcg folic acid).? Take with food as needed.   ?   4-5 servings of dairy or other calcium rich foods (fish, leafy greens, soy)?per day - if not, take 500-1000 mg additional calcium (Tums, pills, chews). Take with dairy.   ?   Vitamin D3 4000 IU geltab daily. Vitamin D rich foods: Cod Liver Oil (1Tbsp), Stanton, Mackerel, Tuna, fortified milk and yogurt, Beef and liver, sardines, egg, fortified cereals, swiss cheese.? Take supplements with fattiest meal.?   ?   2-3 (4) oz servings of fish, seafood, nuts (walnuts & almonds), oils, avocado per week - if not, take Omega 3 Fatty acids: DHA & CHASITY 1985-6936 mg per day. ?Other names: cod liver oil, fish oil. Take with fattiest meal.   ?   ?---------------------------------------------------------------------------------------------------------  POSTPARTUM & LACTATION RESOURCES :    Virtual Breastfeeding Support:    During this time of isolation, breastfeeding families need even more community!  Here are some area organizations offering virtual support groups for breastfeeding:      [facility] Cafe Support Group, Tuesdays at 10:30 am   Run by Thalia Montgomery IBCROW of The Baby [facility] Lactation Consultants   Go to The " "Baby Whisperer Lactation Consultants Facebook page and click on \"events\" for link   [email or web address]    [facility] Milk Hour,  at 2:30 pm    Run by Jarocho Last IBCLC   Go to [facility] FB page and send message to get link   https://www.Giiv.[employer]/healthfoundations/    [facility] holding virtual meetings the first Tuesday of each month, 8-9 pm, and the   Third Saturday, 10 - 11 am.  Go to [facility] and [facility] FB page; message to get link [email or web address]://www.Giiv.[employer]/LLLofGoldElle/?hc_location=[facility] offers a Lactation Lounge every Friday 12pm - 1pm, run by Di Lloyd Roverto Leader   Sign up via link at The Sandpit/cbe-lactation   https://www.The Sandpit/cbe-lactation    [facility] is offering virtual support groups every Monday, 10:30 am - 12 pm, run by nurse IBCLC   Https://[email or web address]    Prenatal Breastfeeding Classes:        [employer] is offering virtual breastfeeding and  care classes:  https://www.The Sandpit/education-workshops    BirthEd childbirth and breastfeeding education offering virtual prenatal breastfeeding classes  https://www.birthedmn.com/workshops    Breastfeeding:   OUTPATIENT LACTATION RESOURCES   -Schedule an appointment with a [facility] NAT who is also a Lactation Consultant by calling [phone]. Ina Sanchez IBCROW, CNM at [facility] on Monday, Ping Engle CNM at [facility] on  and [facility] on .   [facility] Lactation Clinics located at [facility] and [facility]   Call: [phone].     Inpatient support     Outpatient appointments     Telephone consultation     Breast-feeding classes available through RetailerSaver.com     [facility]/WIC/[facility] health improvement partnership: "       Baby Café    Pregnant and interested in breastfeeding?  Need answers to breastfeeding questions?  Want to help breastfeeding moms?  Already breastfeeding and want to meet other moms?    Join us at the Baby Café!    Baby Cafe is a free, drop-in service offering breast-feeding support for pregnant women, breast-feeding mothers and their families.  Come share tips and socialize with other mothers.  Babies and siblings are welcome (no childcare available).    Starting April 2018, Baby Café will be at 4 locations.  Please see below for the Baby Café closest to you!      [facility]  [address] [place] [address]  1st Wednesday: 10am-12pm    [facility]  [address] [place] [address]  3rd Wednesday 4-6pm    [facility]  [address] [place] [address]  4th Wednesday 10am-12:30pm    [facility]  [address] [place] [address]  4th Wednesdays: 4-6pm      Hmong, Tuvaluan, and Ethiopian which is may be available at some sites.    For more information, please contact: [email or web address] [email or web address]. or [phone]    -[place] [facility]:  www.[facility] HospitalarentingC[facility] Clinic Euclid Hospitaler.Nuubo   -Attend a baby weigh in at [place]. Lactation consultants are available to answer questions   [place]: Tuesdays 1:00 - 2:00   [facility]: Mondays 1:00 - 2:00   -Attend a New Mamma group or a Second Time Mama group at [place].     -Enlightened Mama: www.enlightenedmama.com   -Attend one of the New Mama groups at [facility] in [facility]. [facility] also offers one-on-one in home and in office lactation consults.     -[place]: www.solis.org/   -Attend a Elizabeth League meeting. Multiple groups in several locations throughout the [place] campus. The meetings are no-cost and always informative breastfeeding education session through [employer] La Leche League    Held at [facility] the second Thursday of each month at 7pm    - Information on  "medication use while breastfeeding: http://toxnet.nlm.nih.gov/newtoxnet/lactmed.htm     Other Online Breastfeeding Resources:     [facility].org/baby sign up for free online weekly e-mail     [facility].org/maternity     Breastfeedingmadesimple.com     Johnli.org (La Leche League)     Normalfed.com     Womenshealth.gov/breastfeeding     Workandpump.com     [employer]    https://Eureka King/abcs/   Click on \"Learn More About Attachment\"    -New Parent Connection Drop-In:  In collaboration with Shira, Early Childhood Family Education (ECFE), a program of [address], offers ongoing classes for new parents in their infants.  The connection classes offer support and resources to parents during the exciting and challenging period of transition following the birth of her baby.  Parents and babies (birth to 6 months of age) may join the group at any time.  Baby's birth to 3 months meet , from 1230 to 1:30 PM  Babies 3-6 months meet , from 4 to 5 PM    -[place] New Mama Group: www.ApolloMed/free-new-mama-group  -Attend [employer] New Mama. Groups located at three locations:  [place], [place] and [place]. Sign up online.       Additional Resources:?   -American College of Nurse-Midwives (ACNM) http://www.midwife.org/; look at the informational handouts at http://www.midwife.org/Share-With-Women  www.mymidwife.org   ?   -Women's Health.gov:   http://www.womenshealth.gov/a-z-topics/index.html   ?   -Early Childhood and Family Education (ECFE):   ECFE offers parents hands-on learning experiences that will nourish a lifetime of teachable moments.   http://ecfe.info/ecfe-home/       -----------------------------------------------------------------------------------------------------------   (Before, during and after pregnancy)   MOOD DISORDER RESOURCES :    Are you feeling sad or depressed?     Do you feel more irritable or angry with those around you? "     Are you having difficulty bonding with your baby?     Do you feel anxious or panicky?     Are you having problems with eating or sleeping?     Are you having upsetting thoughts that you can t get out of your mind?     Do you feel as if you are  out of control  or  going crazy ?     Do you feel like you never should have become a mother?     Are you worried that you might hurt your baby or yourself?    Any of these symptoms, and many more, could indicate that you have a form of  mood or anxiety disorder, such as postpartum depression. While many women experience some mild mood changes during or after the birth of a child, 15 to 20% of women experience more significant symptoms of depression or anxiety. Please know that with informed care you can prevent a worsening of these symptoms and can fully recover. There is no reason to continue to suffer.  Women of every culture, age, income level and race can develop  mood and anxiety disorders. Symptoms can appear any time during pregnancy and the first 12 months after childbirth. There are effective and well-researched treatment options to help you recover. Although the term  postpartum depression  is most often used, there are actually several forms of illness that women may experience.    Resources:    -Pregnancy and Postpartum Support [place]: www.[facility] Hospitalupportmn.org  Who We Are: We are a group of mental health &  practitioners, service organizations, and mother volunteers who provides services to those struggling with a pregnancy, loss, or postpartum mood disorder through the Helpline, professional training, our resource list and website.  What We Do: We provide support, advocacy, awareness, and training about  mental health in [place].     Community Resource List:   This is a list of  resources within our community.   http://[facility] Hospitalupportmn.org/communityresourcelist    PSYCHOTHERAPISTS/CONSULTANTS   This is a list  "of licensed mental health professionals who have advanced clinical skills in the treatment of postpartum mood and anxiety disorders (PMADs).   Http://[facility].org/mentalhealthproviderresourcelist   INTEGRATIVE MEDICINE PRACTITIONERS:   This is a list of licensed providers who practice Integrative Medicine: a form of treatment that combines alternative medicine with evidence based medicine in an effort to treat the  whole person  (the person, not just the disease).   http://[facility].org/integrativemedicineproviders    PSYCHIATRIC CARE   This is a list of licensed Psychiatrists who have advanced clinical skills in the treatment and medication management for PMADs and lactating mothers.   Http://[facility].org/psychiatryproviderresroucelist   Click on the links below to find detailed profiles and contact information of providers who have been screened and approved as having advanced training in the area of PMADs. Please note: [facility] does not endorse a specific provider.   The list is in alphabetical order by city. You can also search for providers by city or zip code using the search box. Please click on the \"Show\" box to the upper right to advance to the next page. You may also call our Helpline at [phone] if you would like for our Helpline volunteer to find a provider for you.    -Postpartum Depression Support Group:   Weekly groups at no cost through [facility], , 1:30-3:00 p.m., at [facility], [address], [address]. [place], , 1:30-3:00 p.m., at [facility], [address]. To register for the group or get more information, call [phone].   -Postpartum Depression Support Group:   Outpatient Intensive Treatment program for women with  mood disorders [facility]     -[facility]  Resource Guide     Women s Mental Health at [place] " [facility]  This website provides a range of current information including discussion of new research findings in women s mental health and how such investigations inform day-to-day clinical practice. Despite the growing number of studies being conducted in women s health, the clinical implications of such work are frequently controversial, leaving patients with questions regarding the most appropriate path to follow. Providing these resources to patients and their doctors so that individual clinical decisions can be made in a thoughtful and collaborative fashion dovetails with the mission of our Center.    https://[facility] Expresshealth.org/      If you re having thoughts of harming yourself or your baby, it is vital to get support immediately. Call 911 or go to the nearest E.R.     TOLL-FREE / NATIONWIDE EMERGENCY ASSISTANCE   Immediate Emergency:  911   National Suicide Prevention Lifeline:   7-791-840-1827   Suicide Prevention Hotline:   8-831-WIZVCZR   National Postpartum Depression Hotline:   1-800-PPD-MOMS   Postpartum Support International (PSI)   PPD Helpline: (not a 24-hour hotline)   1-912.564.3227       First, the short answer: If you miss a pill, you should take the pill you missed as soon as you can. If you take the pill less than 24 hours after you were supposed to and it's not the first week of a new pack, you don't need a back-up method--just take the pill you missed and relax. If it's the first week of a new pack of pills, you'll want to use a back-up method like condoms if you have sex in the 7 days after missing your pill.   Now, some more detail.  The pill works best if you take it every single day. You may have seen information about  perfect use  stating that the pill is 99.7% effective, but the reality is that with typical use it's 91% effective. (That means that of 11 women taking the pill for a year, one will become pregnant.)  In the real world, it s a challenge for anyone with a daily  "medication to take it perfectly. Even people with serious medical conditions like high blood pressure have a hard time taking medicine every day. The World Health Organization estimates that about half of people with chronic medical conditions take their medication late or skip it completely on some days. In a study of young women using the pill, the majority (80%) missed one or more pills within the first few months.   If you take the pill, your health care provider probably recommended that you take it at the same time every day. Making it part of a daily routine can help you remember. Some women keep the pill packet next to their toothbrush, or use a fun reminder yvette every day.   What should I do about a late pill?  When your routine is a little off, you can still protect yourself from an accidental pregnancy. Exactly what to do depends on three things:  1) What type of pill you use. If you re on the mini-pill, a.k.a. the progestin-only pill, ignore the chart and see the section on mini-pills below.  2) If you re a combination pill user, as most pill users are, it depends on whether it s the first week of a new pill pack, and  3) how long it s been since you took the last pill.  I m using the mini-pill--is that different?  About 1% of women taking the pill in the [place] are using the mini-pill, or progestin-only pill. The active ingredient in mini-pills doesn t stay in your body as long as combined pills, and there are no placebo pills. If you are late or skip a mini-pill at any time, follow the instructions above and use a back-up method like a condom (or EC if you miss the condom) if you have sex in the next two days. Yup, with mini-pills you have to use back-up even if you re only a few hours late.     Emergency Contraception  Combined emergency contraceptive pills (\"morning after pills\") contain two types of hormones, estrogen and progestin, which are commonly found in daily oral contraceptives. You can use " many brands of daily birth control pills to prevent pregnancy after sex.   Doctors recommend that you take your first dose of combined emergency contraceptive pills within 120 hours, followed by a second dose 12 hours later. Each dose contains at least 100 mcg of estrogen and .50 mg of progestin, so the number of pills will depend on which daily oral contraceptive you are using for emergency contraception. You can use combined pills for emergency contraception even if your doctor recommends against using oral contraceptives for your regular birth control (For more details about using these pills, click here. While you have a lot of choices of combined pills you can take for emergency contraception, remember that not every oral contraceptive can be used this way (find out why here).  Combined emergency contraceptive pills reduce your risk of pregnancy by about 75%. Don t worry, this doesn t mean that 25 percent of women get pregnant using these morning after pills. It just means that this type of emergency contraception prevents 75% of the pregnancies researchers would expect to happen when a woman doesn t use birth control, her contraception fails (like the condom breaks or falls off), or she is made to have sex against her will. Usually, if 100 women have sex without using birth control one time during the second or third week of their monthly menstrual cycle, about 8 of them will get pregnant. But if those same women also use combined emergency contraceptive pills, only two will get pregnant. And the sooner you start taking emergency contraception after sex, the better it works. Even so, you should know that progestin-only emergency contraceptive pills are more effective.  About half of women who use combined emergency contraceptive pills experience nausea and about one in 5 vomits. If you throw up within an hour after taking a dose, you may need to take that same dose again. One way to reduce the chances you ll  get sick to your stomach is to use an over-the-counter anti-nausea drug an hour before you take emergency contraception. In the [place], you should look for the long-acting medicine meclizine, which is sold both as a generic and under the brand names Dramamine II and Bonine. Keep in mind, fewer women experience nausea and vomiting after taking progestin-only emergency contraceptive pills.   To learn which brands of combined emergency contraception and daily birth control pills can be found in the [place] or any other country, try using our up-to-date database. Or take a look at this table for quick information about the brands of oral contraceptives in the [place] that can be used as morning after pills. You can also have a health care provider insert a Copper-T IUD up to five days after sex to prevent pregnancy.

## 2021-07-14 NOTE — PROGRESS NOTES
Assessment:     Unsuccessful IUD insertion.     Contraception management, HAJA start  Not lactating    Plan:     1.  Reviewed, witnessed and signed IUD insertion consent form and reviewed alternative options for BCM available. Patient desires IUD insertion today.   2. Advised on unsuccessful attempt at placement today, offered return to office in 3 weeks for subsequent attempt with misoprostol premedication or alternative BCM. Elects for Combined Hormonal Contraception; desires OCP. She is not currently breastfeeding and so electing for combined pill. Reviewed her past medication history and did well with Microgestin 1/20. Rx 1 year.   3.  Advised if desires future IUD placement she may call clinic to schedule and request preprocedure ripening medication.          Subjective:      Jw is a 36 year old female who presents for IUD insert. LMP prior to pregnancy. Current birth control method is abstinence. She is currently 6 weeks postpartum.     6 week postpartum exam on 07/15/21, IUD consult done at that time.     Reports no unprotected intercourse in the last two weeks.     Urine pregnancy test was performed in clinic and was negative. Pt education included mechanism of action of  Mirena IUD. The risks and benefits of the procedure were explained as below to the patient and informed consent was obtained and consent form signed.   Arlyn was given an opportunity to ask questions about all forms of birth control, meaning all prescriptions, non-prescription, and natural methods. All of her questions were answered to her satisfaction and she understood all of those answers. She understands that no method of birth control, except abstinence, is 100% effective against pregnancy or henry sexually transmitted diseases, including Human Immunodeficiency Virus (HIV) infection that leads to the Acquired Immunodeficiency Syndrome (AIDS) disease. The following benefits, risk/side effects, warning signs, control method,  intrauterine decisions to discontinue use option, regarding the birth control method, intrauterine device, were explained to her before she voluntarily decided to use this method of birth control.   BENEFITS: The IUD is 97-99% effective if all the directions regarding its use are followed carfeully. It can be more effective if used with foam or condoms mid-cycle between periods. IUDs containing progestin may decrease menstrual flow and painful menstrual periods. The IUD provides longer protection from pregnancy (Paragard- 10 years; Mirena - 5 years)   RISKS/ SIDE EFFECTS   1. Spotting, bleeding, hemorrhage or anemia   2. Cramping or pain   3. Partial or complete expulsion of device leading to pregnancy, the pregnancy ending in miscarriage   4. Lost IUD string or other string problems   5. Puncturing of the uterus, embedding, or cervical perforation   6. Increased risk of pelvic inflammatory disease   WARNING SIGNS: The patient was advised to call the clinic if she has any of the following early warning signs:   P - Period late (pregnancy), abnormal spotting or bleeding   A - Abdominal pain, pain with intercourse   I - Infection exposure (such as gonorrhea), abnormal discharge   N - Not feeling well, fever, chills   S - String missing, shorter or longer   ALTERNATIVES: She received written information about other methods of birth control and she chose the IUD. She understands that she should check for the IUD strings several times during the first few months after insertion and then after each monthly period or before intercourse.   DECISION TO DISCONTINUE USE: She understands that she may have the IUD removed at any time. She knows not try to remove the device and that it should be removed only by a medical provider. If she does not desire to become pregnant, she has been told she may request to have another IUD inserted or choose to use another method of birth control. If she wishes to become pregnant, she  understands most women not using birth control become pregnant within 12 months.    Review of Systems  Pertinent items are noted in HPI.     Objective:     LMP 08/19/2020     IUD Insertion Procedure Note    Pre-operative Diagnosis: undesired fertility    Post-operative Diagnosis: unsuccessful IUD placement    Indications: Contraception    Procedure Details   Sterile speculum placed. Noted edematous vaginal tissue, cervix flush with vaginal wall and anterior. Offered GC/CT, declined and not collected. Cervix cleansed with Betadine. Tenaculum placed on cervix at 7 o'clock and 5 o'clock. Unable to pass through the inner cervical os Tenaculum removed. Minimal bleeding noted. Patient tolerated procedure well. Did not have any periods of syncope, sat up and ambulated with ease.     IUD Information:  NA not placed.    Condition:  Stable    Complications:  unsuccessful placement        Total time with patient 25 mn >50% time spent in counseling or coordination of care.    Nadira Sanchez, DNP, APRN, CNM

## 2021-07-14 NOTE — PROGRESS NOTES
"Routine 6 week Postpartum Visit  Assessment:   Normal postpartum exam at ~6 weeks postpartum.   formula feeding  S/p primary CS  Acute blood loss anemia, s/p blood transfusion x 3 units  Hgb 11.6 today   negative depression screening    Plan:    1. Pap smear not done at today's visit. Next due for cervical cancer screening February 2023 . Due for annual Gyn exam in one year.  2. Desired contraception: Mirena IUD- unsuccessful in placement.HAJA prescribed.   3. Discussed resumption of exercise and setting a goal to attain ideal weight for height in the next 6-12 months.   4.  Resumption of intercourse reviewed with possible changes in libido and vaginal lubrication while nursing.  5.  Nutrition and supplements reviewed.Recommend taking iron daily and eating iron rich foods. Advised continuation of a prenatal or multivitamin, also Vitamin D3 5000 IU geltab daily and an omega 3 fatty acid supplement.  6. Adjustment to parenting, self care and open communication with her support system discussed. Warning signs and symptoms related to postpartum mood disorders reviewed.     Subjective:   Arlyn  is a 36 year old female who presents for postpartum visit. She states that she is feeling \"back to normal\". She is 6 weeks postpartum following a Primary LTCS for fetal intolerance and arrest of descent after 2 hour second stage. Arlyn experienced a postpartum hemorrhage, required 3 units PRBCs, january hemoglobin was 6.1. Arlyn was seen for shortness of breath following her discharge, given lasix with good effect. She was seen again in the emergency room for excessive bleeding at 2 weeks postpartum and underwent a D&C for retained products. I have fully reviewed the prenatal and intrapartum course. The delivery was at Term and 41 weeks gestation Her baby girl is named Anali Quan and weighed 7 lbs 4 oz at birth.     Postpartum course has been complicated by the above follow up visits to the emergency room. Baby Anali's course has " "been stable. Baby is feeding by Bottle only with formula. Lochia ceased at 4 weeks postpartum.  Bowel function is normal. Bladder function is normal. She has not resumed intercourse. Desired contraception: IUD. [place] postpartum depression screening score: 0, negative #10, no concerns. She has resumed regular exercise, walking. Arlyn plans to return to work on 8/24/21.    Review of Systems  General:  Denies problem  Eyes: Denies problem  Ears/Nose/Throat: Denies problem  Cardiovascular: Denies problem  Respiratory:  Denies problem  Gastrointestinal: Denies problem, Genitourinary: Denies problem  Musculoskeletal:  Denies problem  Skin: Denies problem  Neurologic: Denies problem  Psychiatric:Denies problem   Endocrine: Denies problem    Objective:      Vital signs:      BP: 102/70 Pulse: 80           Height: 160 cm (5' 3\") Weight: 77.3 kg (170 lb 8 oz)  Estimated body mass index is 30.2 kg/m  as calculated from the following:    Height as of this encounter: 1.6 m (5' 3\").    Weight as of this encounter: 77.3 kg (170 lb 8 oz).          Physical Exam:  General Appearance: Alert, cooperative, no distress, appears stated age  Skin: Skin color, texture, turgor normal, no rashes or lesions  HEENT: grossly normal; otoscopic and opthalmic exam not performed.   Neck: Supple, symmetrical, trachea midline, no adenopathy;  thyroid: not enlarged, symmetric, no tenderness/mass/nodules  Lungs: Clear to auscultation bilaterally, respirations unlabored  Breasts: Deferred, lactating  Heart: Regular rate and rhythm, S1 and S2 normal, no murmur, rub, or gallop  Abdomen: Soft, non-tender. Incision is well healed and Diastasis is 2 FB.    Pelvic:External genitalia normal without lesions or irritation. Vagina and cervix show no lesions, inflammation, discharge or tenderness.. No cystocele, No rectocele. Uterus fully involuted.  No adnexal mass or tenderness.  Extremities: Extremities normal without varicosities or edema       Last Pap: " February 2018.. Results were: {NIL and HPV Negative    Immunization History   Administered Date(s) Administered     HPV 08/27/2010, 11/26/2010, 08/02/2011     HPV Quadrivalent 08/27/2010, 11/26/2010, 08/02/2011     HepB, Unspecified 05/13/1998, 07/14/1998, 11/01/1998     Historical DTP/aP 1986, 1986, 1986, 08/11/1987, 02/27/1991, 05/13/1998     Influenza (IIV3) PF 11/07/2013, 10/17/2015     Influenza Vaccine IM > 6 months Valent IIV4 10/01/2016, 10/01/2017, 10/19/2018, 10/17/2019     MMR 05/19/1987, 05/13/1998, 06/28/2019     Mantoux Tuberculin Skin Test 12/06/2011, 07/09/2014, 07/16/2014, 07/10/2015, 07/21/2015     Polio, Unspecified  1986, 1986, 08/11/1987, 02/27/1991     TDAP Vaccine (Boostrix) 11/10/2011     Tdap (Adacel,Boostrix) 11/10/2011, 04/23/2019, 03/04/2021     Immunization status: up to date and documented    Time spent:  Chart review/Pre-charting on 07/15/21: 15 minutes  Face-to-face visit: 20 minutes with >50% on education, counseling and coordination of care.   Documentation:  10 minutes  Total time spent on day of service:  40-54  minutes     Patient was seen with student, BRETT Coburn who was present for learning. I personally assessed, examined and made clinical decisions reflected in the documentation.         Nadira Sanchez, DNP, APRN,CNM

## 2021-07-14 NOTE — PATIENT INSTRUCTIONS
WARNING SIGNS: Call the clinic with any of the following early warning signs:   P - Period late (pregnancy), abnormal spotting or bleeding   A - Abdominal pain, pain with intercourse   I - Infection exposure (such as gonorrhea), abnormal discharge   N - Not feeling well, fever, chills   S - String missing, shorter or longer     Here is a great resource to learn more about your IUD! This webpage has instructions on how to feel for your strings (scroll to the bottom): https://www.bedsider.org/methods/iud#how_to   We always encourage a visit back in clinic at 4-6 weeks after placement so that we can view the IUD strings with an exam and confirm that the IUD is still in place as expected by measuring the length of the strings. Ongoing after that visit, many women are able to perform a self exam or a partner is able to feel the strings. The key is to feel for the cervix! Good luck and we're happy to teach this skill at your visit.   In the meantime, if you can't feel your strings, you may elect to observe for signs of expelling the device (partner has pain with intercourse by abrasion with the plastic end that should be in the uterus) or visually seeing the strings on the outside of your body (they are short enough to always be within your vagina). It's fine to use a back up method of pregnancy prevention, such as condoms, until your clinic visit.   Good luck navigating the website and information and we look forward to seeing you again in clinic.  Warmly,   Nadira Sanchez, DNP, APRN, CNM

## 2021-07-15 ENCOUNTER — PRENATAL OFFICE VISIT (OUTPATIENT)
Dept: MIDWIFE SERVICES | Facility: CLINIC | Age: 36
End: 2021-07-15
Payer: COMMERCIAL

## 2021-07-15 ENCOUNTER — OFFICE VISIT (OUTPATIENT)
Dept: MIDWIFE SERVICES | Facility: CLINIC | Age: 36
End: 2021-07-15
Payer: COMMERCIAL

## 2021-07-15 VITALS
BODY MASS INDEX: 30.21 KG/M2 | DIASTOLIC BLOOD PRESSURE: 70 MMHG | HEIGHT: 63 IN | WEIGHT: 170.5 LBS | SYSTOLIC BLOOD PRESSURE: 102 MMHG | HEART RATE: 80 BPM

## 2021-07-15 VITALS
SYSTOLIC BLOOD PRESSURE: 102 MMHG | DIASTOLIC BLOOD PRESSURE: 70 MMHG | WEIGHT: 170.5 LBS | HEIGHT: 63 IN | HEART RATE: 80 BPM | BODY MASS INDEX: 30.21 KG/M2

## 2021-07-15 DIAGNOSIS — Z01.812 PRE-PROCEDURE LAB EXAM: Primary | ICD-10-CM

## 2021-07-15 DIAGNOSIS — D62 ANEMIA DUE TO BLOOD LOSS, ACUTE: ICD-10-CM

## 2021-07-15 DIAGNOSIS — Z53.8 UNSUCCESSFUL ATTEMPT TO INSERT INTRAUTERINE DEVICE (IUD): ICD-10-CM

## 2021-07-15 DIAGNOSIS — Z30.41 ORAL CONTRACEPTIVE USE: ICD-10-CM

## 2021-07-15 DIAGNOSIS — Z30.011 ENCOUNTER FOR INITIAL PRESCRIPTION OF CONTRACEPTIVE PILLS: ICD-10-CM

## 2021-07-15 PROBLEM — Z34.01 ENCOUNTER FOR SUPERVISION OF NORMAL FIRST PREGNANCY IN FIRST TRIMESTER: Status: RESOLVED | Noted: 2018-11-27 | Resolved: 2021-07-15

## 2021-07-15 PROBLEM — Z98.891 HISTORY OF CESAREAN SECTION: Status: ACTIVE | Noted: 2021-06-02

## 2021-07-15 LAB
HCG UR QL: NEGATIVE
HGB BLD-MCNC: 11.6 G/DL (ref 11.7–15.7)

## 2021-07-15 PROCEDURE — 85018 HEMOGLOBIN: CPT | Performed by: ADVANCED PRACTICE MIDWIFE

## 2021-07-15 PROCEDURE — 36415 COLL VENOUS BLD VENIPUNCTURE: CPT | Performed by: ADVANCED PRACTICE MIDWIFE

## 2021-07-15 PROCEDURE — 99207 PR POST PARTUM EXAM: CPT | Performed by: ADVANCED PRACTICE MIDWIFE

## 2021-07-15 PROCEDURE — 81025 URINE PREGNANCY TEST: CPT | Performed by: ADVANCED PRACTICE MIDWIFE

## 2021-07-15 PROCEDURE — 58300 INSERT INTRAUTERINE DEVICE: CPT | Mod: 53 | Performed by: ADVANCED PRACTICE MIDWIFE

## 2021-07-15 RX ORDER — NORETHINDRONE ACETATE AND ETHINYL ESTRADIOL .02; 1 MG/1; MG/1
1 TABLET ORAL DAILY
Qty: 63 TABLET | Refills: 4 | Status: SHIPPED | OUTPATIENT
Start: 2021-07-15 | End: 2021-12-13

## 2021-07-15 ASSESSMENT — MIFFLIN-ST. JEOR
SCORE: 1437.51
SCORE: 1437.51

## 2021-08-17 ENCOUNTER — MYC MEDICAL ADVICE (OUTPATIENT)
Dept: MIDWIFE SERVICES | Facility: CLINIC | Age: 36
End: 2021-08-17

## 2021-09-28 ENCOUNTER — OFFICE VISIT (OUTPATIENT)
Dept: FAMILY MEDICINE | Facility: CLINIC | Age: 36
End: 2021-09-28
Payer: COMMERCIAL

## 2021-09-28 VITALS
SYSTOLIC BLOOD PRESSURE: 112 MMHG | DIASTOLIC BLOOD PRESSURE: 70 MMHG | BODY MASS INDEX: 30.11 KG/M2 | WEIGHT: 170 LBS | HEART RATE: 73 BPM | OXYGEN SATURATION: 97 %

## 2021-09-28 DIAGNOSIS — M79.662 PAIN OF LEFT CALF: Primary | ICD-10-CM

## 2021-09-28 PROCEDURE — 99213 OFFICE O/P EST LOW 20 MIN: CPT | Performed by: FAMILY MEDICINE

## 2021-10-02 ENCOUNTER — HEALTH MAINTENANCE LETTER (OUTPATIENT)
Age: 36
End: 2021-10-02

## 2021-10-03 NOTE — PROGRESS NOTES
"    Assessment & Plan     Pain of left calf    I reassured her that I do not think this is a blood clot nor anything that we have to work-up any further.  I think that she just has a bit of a muscle strain there.  There is no redness or warmth or really any swelling at all.  And she thinks it seems to be better today.  If it gets worse including the symptoms as listed above, or of course if she gets any chest pain or shortness of breath she will be seen right away.  Otherwise she can use heat and ibuprofen and massage and following up as mentioned.               BMI:   Estimated body mass index is 30.11 kg/m  as calculated from the following:    Height as of 7/15/21: 1.6 m (5' 3\").    Weight as of this encounter: 77.1 kg (170 lb).           No follow-ups on file.    Anibal Cruz MD  [facility] Stoddard is a 36 year old who presents for the following health issues     HPI     Patient is here today for examination of her left calf.  She states that she has has been exercising a bit more recently and over the last couple of days she has noticed some pain into her left calf mostly in the posterior calf up proximally to where it is close to the posterior knee.  There is been no redness or warmth.  There is been no asymmetry of the calf musculature.  It does not hurt when she walks.  She has not taken any medication for it and has not used any ice or heat.Constitutional, HEENT, cardiovascular, pulmonary, gi and gu systems are negative, except as otherwise noted.      Review of Systems   Constitutional, HEENT, cardiovascular, pulmonary, gi and gu systems are negative, except as otherwise noted.      Objective    /70 (BP Location: Left arm, Patient Position: Sitting, Cuff Size: Adult Regular)   Pulse 73   Wt 77.1 kg (170 lb)   SpO2 97%   BMI 30.11 kg/m    Body mass index is 30.11 kg/m .  Physical Exam   Well-appearing female no acute distress.  Vital signs as noted.  " Chest clear to auscultation.  Heart regular rate and rhythm.  The calves are symmetric bilaterally.  There is negative Homans' sign.  No redness or warmth noted.

## 2021-11-03 DIAGNOSIS — K64.9 HEMORRHOIDS, UNSPECIFIED HEMORRHOID TYPE: Primary | ICD-10-CM

## 2021-11-03 DIAGNOSIS — Z30.430 ENCOUNTER FOR IUD INSERTION: Primary | ICD-10-CM

## 2021-11-03 RX ORDER — MISOPROSTOL 200 UG/1
TABLET ORAL
Qty: 2 TABLET | Refills: 1 | Status: SHIPPED | OUTPATIENT
Start: 2021-11-03 | End: 2021-12-13

## 2021-12-13 ENCOUNTER — OFFICE VISIT (OUTPATIENT)
Dept: MIDWIFE SERVICES | Facility: CLINIC | Age: 36
End: 2021-12-13
Payer: COMMERCIAL

## 2021-12-13 VITALS
OXYGEN SATURATION: 97 % | WEIGHT: 169.7 LBS | SYSTOLIC BLOOD PRESSURE: 130 MMHG | HEART RATE: 93 BPM | DIASTOLIC BLOOD PRESSURE: 78 MMHG | BODY MASS INDEX: 30.07 KG/M2 | HEIGHT: 63 IN

## 2021-12-13 DIAGNOSIS — Z53.8 UNSUCCESSFUL ATTEMPT TO INSERT INTRAUTERINE DEVICE (IUD): ICD-10-CM

## 2021-12-13 DIAGNOSIS — Z01.812 PRE-PROCEDURE LAB EXAM: ICD-10-CM

## 2021-12-13 DIAGNOSIS — Z30.430 ENCOUNTER FOR INSERTION OF INTRAUTERINE CONTRACEPTIVE DEVICE: Primary | ICD-10-CM

## 2021-12-13 LAB — HCG UR QL: NEGATIVE

## 2021-12-13 PROCEDURE — 81025 URINE PREGNANCY TEST: CPT | Performed by: ADVANCED PRACTICE MIDWIFE

## 2021-12-13 PROCEDURE — 58300 INSERT INTRAUTERINE DEVICE: CPT | Mod: 53 | Performed by: ADVANCED PRACTICE MIDWIFE

## 2021-12-13 RX ORDER — POLYETHYLENE GLYCOL 3350 17 G/17G
1 POWDER, FOR SOLUTION ORAL DAILY
COMMUNITY
End: 2022-03-10

## 2021-12-13 ASSESSMENT — MIFFLIN-ST. JEOR: SCORE: 1433.88

## 2021-12-13 NOTE — PROGRESS NOTES
"IUD Insertion:  Is a pregnancy test required: Yes.  Was it positive or negative?  Negative  Was a consent obtained?  Yes    Subjective: Arlyn Escalera is a 36 year old  presents for IUD and desires Mirena IUD for bith control. She had a  birth on 6/3/2021 with Dr. Buckley. Operative note indicate, \"There were 2 hysterotomy extensions down to the cervix behind the bladder.  Secondary to the bladder distension, visualization was difficult.  The extensions were re approximated using 0 Vicryl.\"     Today was Arlyn's second attempt at IUD placement. Previous attempt states sound could not pass through internal cervical os. She did take misoprostol this time before her appointment as prescribed.     Patient has been given the opportunity to ask questions about all forms of birth control, including all options appropriate. Arlyn understands she may have the IUD removed at any time. IUD should be removed by a health care provider.The entire insertion procedure was reviewed with the patient, including care after placement.     No LMP recorded. Denies unprotected intercourse in the past 2 weeks. Takin OCPs. No allergy to betadine or shellfish  .   hCG Urine Qualitative   Date Value Ref Range Status   2021 Negative Negative Final     Comment:     This test is for screening purposes.  Results should be interpreted along with the clinical picture.  Confirmation testing is available if warranted by ordering [id number], HCG Quantitative Pregnancy.     /78   Pulse 93   Ht 1.6 m (5' 3\")   Wt 77 kg (169 lb 11.2 oz)   SpO2 97%   BMI 30.06 kg/m      Pelvic Exam:   Normal external genitalia without lesions, erythema or abnormal secretions.   Vagina: moist, pink, rugae with physiologic discharge.  Cervix: difficult to visualize, had to attempt several different speculums with long Graves working the best, however visualization was still obscured/folded appearing cervix and flush to back of vagina.  Uterus: mid " position, mobile, no pain  Adnexa: within normal limits and no masses, nodularity, tenderness    PROCEDURE NOTE: -- IUD Insertion    Reason for Insertion: contraception    Patient premedicated with Tylenol.  Under sterile technique, cervix was visualized with speculum and prepped with Betadine solution swab x 3. Tenaculum not applied as cervix had folded appearance. Attempted to sound uterus gently where cervical os believed to be located and achieved 5cm with first attempt and 6cm with second attempt. Provider did not feel confident that the sounding was sufficient. IUD remained unopened. Did not place today. Speculum removed.  Patient tolerated procedure.    EBL: minimal      Complications: Unsuccessful IUD placement.    ASSESSMENT:     ICD-10-CM    1. Encounter for insertion of intrauterine contraceptive device  Z30.430 levonorgestrel (MIRENA) 20 MCG/24HR IUD     CANCELED: INSERTION INTRAUTERINE DEVICE   2. Pre-procedure lab exam  Z01.812 HCG qualitative urine   3. Unsuccessful attempt to insert intrauterine device (IUD)  Z53.8 Ob/Gyn Referral        PLAN:  Offered OBGYN referral to see if they could place IUD with more confidence vs an alternative form of birth control. She is not interested in taking pills anymore - offered for her to keep taking until another method is chosen/in place. Somewhat interested in Nexplanon. Reviewed risks and benefits of this LARC method. She will think about her options and let us know. A referral to OBGYN was placed (she preferred provider who performed her , Dr. Buckley) in case she does want to proceed with another IUD attempt. Plans condoms until she has another method in place.    Tatyana Carmen CNM

## 2021-12-14 ENCOUNTER — TELEPHONE (OUTPATIENT)
Dept: MIDWIFE SERVICES | Facility: CLINIC | Age: 36
End: 2021-12-14
Payer: COMMERCIAL

## 2022-01-04 NOTE — TELEPHONE ENCOUNTER
Diagnosis, Referred by & from: Hemorrhoids, referred by Mónica Crabtree CNM internally   Appt date: 3/10/2022   NOTES STATUS DETAILS   OFFICE NOTE from referring provider  [facility]:  11/2/21 - MyChart referral form Mónica Crabtree CNM   OFFICE NOTE from other specialist   N/A    DISCHARGE SUMMARY from hospital  N/A    DISCHARGE REPORT from the ER Internal [facility]:  6/18/21 - ED OV with Dr. Pina   OPERATIVE REPORT  N/A    MEDICATION LIST Internal    LABS N/A    DIAGNOSTIC PROCEDURES N/A    IMAGING (DISC & REPORT)      ULTRASOUND (ENDOANAL/ENDORECTAL) Internal [facility]:  6/18/21 - US Pelvic

## 2022-01-12 VITALS — BODY MASS INDEX: 33.49 KG/M2 | WEIGHT: 189 LBS | HEIGHT: 63 IN

## 2022-01-12 VITALS — BODY MASS INDEX: 30.12 KG/M2 | WEIGHT: 170 LBS | HEIGHT: 63 IN

## 2022-01-18 VITALS
DIASTOLIC BLOOD PRESSURE: 76 MMHG | WEIGHT: 189 LBS | BODY MASS INDEX: 33.49 KG/M2 | HEIGHT: 63 IN | SYSTOLIC BLOOD PRESSURE: 124 MMHG | HEART RATE: 88 BPM

## 2022-01-18 VITALS
BODY MASS INDEX: 31.47 KG/M2 | DIASTOLIC BLOOD PRESSURE: 70 MMHG | HEART RATE: 96 BPM | HEIGHT: 63 IN | WEIGHT: 177.6 LBS | SYSTOLIC BLOOD PRESSURE: 112 MMHG

## 2022-01-18 VITALS
HEART RATE: 116 BPM | WEIGHT: 187.8 LBS | DIASTOLIC BLOOD PRESSURE: 74 MMHG | HEIGHT: 63 IN | SYSTOLIC BLOOD PRESSURE: 126 MMHG | BODY MASS INDEX: 33.27 KG/M2

## 2022-01-18 VITALS
BODY MASS INDEX: 30.3 KG/M2 | DIASTOLIC BLOOD PRESSURE: 60 MMHG | HEART RATE: 80 BPM | WEIGHT: 171 LBS | HEIGHT: 63 IN | SYSTOLIC BLOOD PRESSURE: 110 MMHG

## 2022-01-18 VITALS
BODY MASS INDEX: 33.13 KG/M2 | HEIGHT: 63 IN | WEIGHT: 187 LBS | SYSTOLIC BLOOD PRESSURE: 122 MMHG | HEART RATE: 84 BPM | DIASTOLIC BLOOD PRESSURE: 86 MMHG

## 2022-01-18 VITALS
BODY MASS INDEX: 31.42 KG/M2 | OXYGEN SATURATION: 97 % | SYSTOLIC BLOOD PRESSURE: 122 MMHG | HEART RATE: 95 BPM | HEIGHT: 63 IN | WEIGHT: 177.31 LBS | DIASTOLIC BLOOD PRESSURE: 80 MMHG

## 2022-01-18 VITALS
WEIGHT: 186.7 LBS | BODY MASS INDEX: 33.08 KG/M2 | HEART RATE: 112 BPM | SYSTOLIC BLOOD PRESSURE: 130 MMHG | HEIGHT: 63 IN | DIASTOLIC BLOOD PRESSURE: 78 MMHG

## 2022-01-18 VITALS
SYSTOLIC BLOOD PRESSURE: 114 MMHG | HEART RATE: 88 BPM | WEIGHT: 169 LBS | HEIGHT: 63 IN | BODY MASS INDEX: 29.95 KG/M2 | DIASTOLIC BLOOD PRESSURE: 62 MMHG

## 2022-01-18 VITALS
HEIGHT: 63 IN | HEART RATE: 100 BPM | BODY MASS INDEX: 33.43 KG/M2 | SYSTOLIC BLOOD PRESSURE: 114 MMHG | DIASTOLIC BLOOD PRESSURE: 86 MMHG | WEIGHT: 188.7 LBS

## 2022-01-18 VITALS
BODY MASS INDEX: 32.23 KG/M2 | SYSTOLIC BLOOD PRESSURE: 120 MMHG | DIASTOLIC BLOOD PRESSURE: 80 MMHG | WEIGHT: 181.9 LBS | HEART RATE: 84 BPM | HEIGHT: 63 IN

## 2022-01-18 VITALS
DIASTOLIC BLOOD PRESSURE: 64 MMHG | HEART RATE: 84 BPM | WEIGHT: 169.6 LBS | SYSTOLIC BLOOD PRESSURE: 116 MMHG | BODY MASS INDEX: 30.05 KG/M2 | HEIGHT: 63 IN

## 2022-01-18 VITALS
HEIGHT: 63 IN | HEART RATE: 92 BPM | SYSTOLIC BLOOD PRESSURE: 122 MMHG | WEIGHT: 185.2 LBS | BODY MASS INDEX: 32.82 KG/M2 | DIASTOLIC BLOOD PRESSURE: 84 MMHG

## 2022-01-18 VITALS
BODY MASS INDEX: 32.6 KG/M2 | SYSTOLIC BLOOD PRESSURE: 124 MMHG | HEART RATE: 86 BPM | HEIGHT: 63 IN | WEIGHT: 184 LBS | DIASTOLIC BLOOD PRESSURE: 66 MMHG

## 2022-02-11 ENCOUNTER — TELEPHONE (OUTPATIENT)
Dept: SURGERY | Facility: CLINIC | Age: 37
End: 2022-02-11
Payer: COMMERCIAL

## 2022-02-17 PROBLEM — O09.529 SUPERVISION OF HIGH-RISK PREGNANCY OF ELDERLY MULTIGRAVIDA: Status: RESOLVED | Noted: 2020-12-15 | Resolved: 2021-06-15

## 2022-02-17 PROBLEM — Z34.03 ENCOUNTER FOR SUPERVISION OF NORMAL FIRST PREGNANCY IN THIRD TRIMESTER: Status: RESOLVED | Noted: 2019-03-19 | Resolved: 2019-08-06

## 2022-03-08 ENCOUNTER — OFFICE VISIT (OUTPATIENT)
Dept: FAMILY MEDICINE | Facility: CLINIC | Age: 37
End: 2022-03-08
Payer: COMMERCIAL

## 2022-03-08 VITALS
DIASTOLIC BLOOD PRESSURE: 78 MMHG | SYSTOLIC BLOOD PRESSURE: 130 MMHG | WEIGHT: 169.9 LBS | HEIGHT: 63 IN | HEART RATE: 88 BPM | BODY MASS INDEX: 30.11 KG/M2

## 2022-03-08 DIAGNOSIS — Z13.1 DIABETES MELLITUS SCREENING: ICD-10-CM

## 2022-03-08 DIAGNOSIS — Z12.83 SKIN EXAM, SCREENING FOR CANCER: ICD-10-CM

## 2022-03-08 DIAGNOSIS — J30.9 ALLERGIC RHINITIS, UNSPECIFIED SEASONALITY, UNSPECIFIED TRIGGER: ICD-10-CM

## 2022-03-08 DIAGNOSIS — Z13.220 LIPID SCREENING: ICD-10-CM

## 2022-03-08 DIAGNOSIS — Z00.00 ENCOUNTER FOR ROUTINE HISTORY AND PHYSICAL EXAM IN FEMALE: Primary | ICD-10-CM

## 2022-03-08 PROCEDURE — 99395 PREV VISIT EST AGE 18-39: CPT | Performed by: NURSE PRACTITIONER

## 2022-03-08 RX ORDER — NITROFURANTOIN 25; 75 MG/1; MG/1
CAPSULE ORAL
COMMUNITY
Start: 2022-03-04 | End: 2022-03-10

## 2022-03-08 NOTE — PROGRESS NOTES
"Colon and Rectal Surgery Clinic Note    RE: Arlyn Escalera.  : 1985.  ANIBAL: 3/17/2022.    Reason for visit: hemorrhoids     HPI: Arlyn is a 37 year old female who presents today for hemorrhoids. She is s/p  section on 2021 with Dr. Fabi [facility]ley at [facility]. Since her  she reports having \"issues\" with hemorrhoids.     She had hemorrhoid issues after her first pregnancy and they resolved.  The issues have not resolved after this, her second pregnancy/birth.    She took metamucil at first but now only sporadically.  No prior colonoscopy.    She notices some pain with bowel movements.    She noticed some blood 1-2 times early on, but this has stopped.    Medical history:  1. Anxiety   2. Sleep laceration   3.  section     Surgical history:  1. Appendectomy   2.  section   2. 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 Father         MI mild at age 50     Seasonal/Environmental Allergies Father      Skin Cancer No family hx of      Glaucoma No family hx of      Macular Degeneration No family hx of      Melanoma No family hx of      Coronary Artery Disease Father 50.00     Heart Disease Father      Hypertension Father      Thyroid Disease Mother      Alcoholism Brother      Cancer Maternal Grandfather      Alcoholism Paternal Grandfather      Heart Disease Paternal Grandfather      No IBD or GI malignancy.    Medications:  Current Outpatient Medications   Medication Sig Dispense Refill     Docusate Calcium (STOOL SOFTENER PO)        fluticasone (FLONASE) 50 MCG/ACT nasal spray Spray 2 sprays into both nostrils daily 1 g 5     nitroFURantoin macrocrystal-monohydrate (MACROBID) 100 MG capsule TAKE 1 CAPSULE BY MOUTH EVERY 12 HOURS WITH FOOD FOR 5 DAYS       polyethylene glycol (MIRALAX) 17 g packet Take 1 packet by mouth daily       Skin Protectants, Misc. (EUCERIN) cream " Apply topically as needed for dry skin Or whatever size covered by insurance 454 g 1       Allergies:  Allergies   Allergen Reactions     Keppra Unknown     Levetiracetam Unknown     Morphine [Morphine Sulfate] Unknown       Social history:   Social History     Tobacco Use     Smoking status: Never Smoker     Smokeless tobacco: Never Used   Substance Use Topics     Alcohol use: Yes     Comment: Alcoholic Drinks/day: rare     Marital status: .    ROS:  A complete review of systems was performed with the patient and all systems negative except as per HPI.    Physical Examination:  Exam was chaperoned by Stephy Gore CMA  There were no vitals taken for this visit.  General: Well hydrated. No acute distress.  Abdomen: Soft, not distended  Perianal external examination:  Perianal skin: Intact with no excoriation or lichenification.  Lesions: No evidence of an external lesion, nodularity, or induration in the perianal region.  Eversion of buttocks: There was not evidence of an anal fissure. Details: N/A.  Skin tags or external hemorrhoids: Yes: right anterior external hemorrhoid, mildly inflamed and not thrombosed.  Digital rectal examination: Was performed.   Sphincter tone: Good.  Palpable lesions: No.  Other: None.  Bimanual examination: was not performed.    Anoscopy: Was performed.   Hemorrhoids: Mild right sided internal hemorrhoids; external component was worse.  Lesions: No    Procedures:  None.    Laboratory values reviewed:  Recent Labs   Lab Test 07/15/21  1142 06/18/21  1341 06/18/21  0438 06/18/21  0438 06/10/21  0310 06/10/21  0310   WBC  --   --   --  14.5*   < >  --    HGB 11.6*   < >  --  9.3*   < >  --    PLT  --   --   --  601*   < >  --    CR  --   --  0.78  --    < > 0.78   ALBUMIN  --   --   --   --   --  2.2*   BILITOTAL  --   --   --   --   --  0.6   ALKPHOS  --   --   --   --   --  132*   ALT  --   --   --   --   --  34   AST  --   --   --   --   --  20   INR  --   --  1.04  --    < >   --     < > = values in this interval not displayed.       ASSESSMENT  38 y/o lady with mixed hemorrhoids.    Risks, benefits, and alternatives of operative treatment were thoroughly discussed with the patient, he/she understands these well and agrees to proceed.    PLAN  1. Fiber/water  2. F/u in 8 weeks    45 minutes spent on the date of the encounter doing chart review, history and exam, imaging review, documentation and further activities as noted above.      Curry Ortiz MD, PhD    Division of Colon and Rectal Surgery  [facility]    Referring Provider:  Mónica Crabtree CNM  [facility] [address]     Primary Care Provider:  Nilam Conde

## 2022-03-08 NOTE — PROGRESS NOTES
Assessment and Plan:    Encounter for routine history and physical exam in female  Recommend consuming a healthy diet and exercising.  She declines hepatitis C and HIV screening.  She will follow-up for fasting labs.  - Hemoglobin    Diabetes mellitus screening  - Glucose    Lipid screening  - Lipid panel reflex to direct LDL Fasting    Skin exam, screening for cancer  We will refer to dermatology for thorough skin exam.  - Adult Dermatology Referral    Allergic rhinitis, unspecified seasonality, unspecified trigger  She continues Flonase.       Subjective:     Arlyn is a 37 year old female presenting to the clinic for a female physical.     LMP: IUD in December, one week ago   Hx of abnormal pap smear: none   Last pap smear: 19, negative HPV   Perform self-breast exams: yes   Vaginal discharge or irritation: none   Sexually active: yes,  for 5 years   Contraception: IUD   Concerns for STDs: none   Previous pregnancies:two pregnancies (both )  Anali is 9 months and Alina 3     She uses Flonase for allergies. She requests referral to dermatology for thorough skin exam.     Review of systems:  I performed a 10 point review of systems.  All pertinent positives and negatives are noted in the HPI. All others are negative.     Allergies   Allergen Reactions     Keppra Unknown     Levetiracetam Unknown     Morphine [Morphine Sulfate] Unknown       Current Outpatient Medications   Medication     Docusate Calcium (STOOL SOFTENER PO)     fluticasone (FLONASE) 50 MCG/ACT nasal spray     nitroFURantoin macrocrystal-monohydrate (MACROBID) 100 MG capsule     polyethylene glycol (MIRALAX) 17 g packet     Skin Protectants, Misc. (EUCERIN) cream     No current facility-administered medications for this visit.       Social History     Socioeconomic History     Marital status:      Spouse name: Tan     Number of children: 1     Years of education: College Grad     Highest education level: Not on file    Occupational History     Occupation:      Employer: [employer]   Tobacco Use     Smoking status: Never Smoker     Smokeless tobacco: Never Used   Vaping Use     Vaping Use: Never used   Substance and Sexual Activity     Alcohol use: Yes     Comment: Alcoholic Drinks/day: rare     Drug use: No     Sexual activity: Yes     Partners: Male     Comment: single   Other Topics Concern     Parent/sibling w/ CABG, MI or angioplasty before 65F 55M? No   Social History Narrative    Social Documentation:  Has a dog        Balanced Diet: YES    Calcium intake: 2-3 per day    Caffeine: 1-2 per day    Exercise:  type of activity cardio;  3-4 times per week    Sunscreen: Yes    Seatbelts:  Yes    Self Breast Exam:  No    Self Testicular Exam: na    Physical/Emotional/Sexual Abuse: No    Do you feel safe in your environment? Yes        Cholesterol screen up to date: no fam hx    Eye Exam up to date: Yes    Dental Exam up to date: Yes    Pap smear up to date: Do today.  Last was , nml    Mammogram up to date: Does Not Apply    Dexa Scan up to date: Does Not Apply    Colonoscopy up to date: Does Not Apply    Immunizations up to date: unsure of when    Glucose screen if over 40:  na                     Social Determinants of Health     Financial Resource Strain: Not on file   Food Insecurity: Not on file   Transportation Needs: Not on file   Physical Activity: Not on file   Stress: Not on file   Social Connections: Not on file   Intimate Partner Violence: Not on file   Housing Stability: Not on file       Past Medical History:   Diagnosis Date     Anxiety     mostly in college     Gestational hypertension, third trimester 2019     S/P primary low transverse       Spleen laceration 2002    Playing hockey     Varicella     Chickenpox as a child       Family History   Problem Relation Age of Onset     Thyroid Disease Mother      C.A.D. Father         MI mild at age 50     Seasonal/Environmental  "Allergies Father      Coronary Artery Disease Father 50     Heart Disease Father      Hypertension Father      Alcoholism Brother      Cancer Maternal Grandfather      Alcoholism Paternal Grandfather      Heart Disease Paternal Grandfather      Skin Cancer No family hx of      Glaucoma No family hx of      Macular Degeneration No family hx of      Melanoma No family hx of        Past Surgical History:   Procedure Laterality Date     APPENDECTOMY  2000      SECTION N/A 2021    Procedure:  SECTION;  Surgeon: Fabi Buckley DO;  Location: [facility];  Service: Obstetrics     DILATION AND CURETTAGE       OTHER SURGICAL HISTORY  2001    Lacerated spleen repairPlaying hockey       Objective:     /78 (BP Location: Left arm, Patient Position: Sitting, Cuff Size: Adult Regular)   Pulse 88   Ht 1.6 m (5' 3\")   Wt 77.1 kg (169 lb 14.4 oz)   BMI 30.10 kg/m      Patient is alert, no obvious distress.   Skin: Warm, dry.  No rashes or lesions. Skin turgor rapid return.   HEENT:  Eyes normal.  Ears normal.  Nose patent, mucosa pink.  Oropharynx mucosa pink, no lesions or tonsil enlargement.   Neck:  Supple, without lymphadenopathy, bruits, JVD. Thyroid normal texture and size.    Lungs:  Clear to auscultation.  No wheezing, rales noted.  Respirations even and unlabored.   Heart:  Regular rate and rhythm.  No murmurs.   Breasts:  Normal.  No surrounding adenopathy.   Abdomen: Soft, nontender.  No organomegaly.  Bowel sounds normoactive.  No guarding or masses noted.   :  deferred  Musculoskeletal:  Full ROM of extremities.  Muscle strength equal +5/5.   Neurological:  Cranial nerves 2-12 intact.                "

## 2022-03-10 ENCOUNTER — OFFICE VISIT (OUTPATIENT)
Dept: FAMILY MEDICINE | Facility: CLINIC | Age: 37
End: 2022-03-10
Payer: COMMERCIAL

## 2022-03-10 ENCOUNTER — PRE VISIT (OUTPATIENT)
Dept: SURGERY | Facility: CLINIC | Age: 37
End: 2022-03-10

## 2022-03-10 VITALS
TEMPERATURE: 98.1 F | BODY MASS INDEX: 29.76 KG/M2 | WEIGHT: 168 LBS | HEART RATE: 99 BPM | SYSTOLIC BLOOD PRESSURE: 118 MMHG | OXYGEN SATURATION: 97 % | DIASTOLIC BLOOD PRESSURE: 80 MMHG

## 2022-03-10 DIAGNOSIS — H61.22 IMPACTED CERUMEN OF LEFT EAR: ICD-10-CM

## 2022-03-10 DIAGNOSIS — H92.02 LEFT EAR PAIN: Primary | ICD-10-CM

## 2022-03-10 PROBLEM — Z30.41 ORAL CONTRACEPTIVE USE: Status: RESOLVED | Noted: 2021-07-15 | Resolved: 2022-03-10

## 2022-03-10 PROCEDURE — 99213 OFFICE O/P EST LOW 20 MIN: CPT | Mod: 25 | Performed by: NURSE PRACTITIONER

## 2022-03-10 PROCEDURE — 69209 REMOVE IMPACTED EAR WAX UNI: CPT | Mod: LT | Performed by: NURSE PRACTITIONER

## 2022-03-10 RX ORDER — AMOXICILLIN 500 MG/1
500 CAPSULE ORAL 3 TIMES DAILY
Qty: 21 CAPSULE | Refills: 0 | Status: SHIPPED | OUTPATIENT
Start: 2022-03-10 | End: 2022-03-17

## 2022-03-10 NOTE — PROGRESS NOTES
"  Assessment & Plan     Left ear pain  Left ear canal completely occluded with cerumen.  This was flushed by the CMA and patient tolerated this well.  The ear canal is erythematous along with the TM.  The TM is dull.  There is a very small amount of purulent drainage.  Patient may give the ear a day or 2 to see how it feels.  If she continues to have pain or discomfort, I did send in a prescription for amoxicillin.  - amoxicillin (AMOXIL) 500 MG capsule  Dispense: 21 capsule; Refill: 0    Impacted cerumen of left ear  - MA REMOVAL IMPACTED CERUMEN IRRIGATION/LVG UNILAT         BMI:   Estimated body mass index is 29.76 kg/m  as calculated from the following:    Height as of 3/8/22: 1.6 m (5' 3\").    Weight as of this encounter: 76.2 kg (168 lb).       Return in about 1 week (around 3/17/2022), or if symptoms worsen or fail to improve.    Gely Mei NP  [facility]    Jeannie Stoddard is a 37 year old who presents with complaints of left ear pain.  Symptoms started last night.  The left ear feels painful and plugged.  Her hearing is muffled.  She also has a mild sore throat.  No fevers, cough, or congestion.  She does have a history of chronic ear infections as a child.    Review of Systems   Pertinent items in HPI      Objective    /80 (BP Location: Right arm, Patient Position: Sitting, Cuff Size: Adult Regular)   Pulse 99   Temp 98.1  F (36.7  C) (Oral)   Wt 76.2 kg (168 lb)   SpO2 97%   BMI 29.76 kg/m    Body mass index is 29.76 kg/m .  Physical Exam   GENERAL: healthy, alert and no distress  EYES: Eyes grossly normal to inspection, PERRL and conjunctivae and sclerae normal  HENT: right ear: normal: no effusions, no erythema, normal landmarks, scarring left ear: unable to visualize secondary to cerumen. Post irrigation: left tm and canal erythematous. TM dull. Small amounts of purulent drainage. oropharynx clear and oral mucous membranes moist  NECK: no adenopathy, " no asymmetry, masses, or scars and thyroid normal to palpation

## 2022-03-15 ENCOUNTER — LAB (OUTPATIENT)
Dept: LAB | Facility: CLINIC | Age: 37
End: 2022-03-15
Payer: COMMERCIAL

## 2022-03-15 DIAGNOSIS — Z11.59 NEED FOR HEPATITIS C SCREENING TEST: Primary | ICD-10-CM

## 2022-03-15 DIAGNOSIS — Z00.00 ENCOUNTER FOR ROUTINE HISTORY AND PHYSICAL EXAM IN FEMALE: ICD-10-CM

## 2022-03-15 DIAGNOSIS — Z13.1 DIABETES MELLITUS SCREENING: ICD-10-CM

## 2022-03-15 DIAGNOSIS — Z13.220 LIPID SCREENING: ICD-10-CM

## 2022-03-15 LAB
CHOLEST SERPL-MCNC: 124 MG/DL
FASTING STATUS PATIENT QL REPORTED: YES
FASTING STATUS PATIENT QL REPORTED: YES
GLUCOSE BLD-MCNC: 85 MG/DL (ref 70–125)
HCV AB SERPL QL IA: NONREACTIVE
HDLC SERPL-MCNC: 41 MG/DL
HGB BLD-MCNC: 14.1 G/DL (ref 11.7–15.7)
LDLC SERPL CALC-MCNC: 65 MG/DL
TRIGL SERPL-MCNC: 91 MG/DL

## 2022-03-15 PROCEDURE — 36415 COLL VENOUS BLD VENIPUNCTURE: CPT

## 2022-03-15 PROCEDURE — 80061 LIPID PANEL: CPT

## 2022-03-15 PROCEDURE — 82947 ASSAY GLUCOSE BLOOD QUANT: CPT

## 2022-03-15 PROCEDURE — 85018 HEMOGLOBIN: CPT

## 2022-03-15 PROCEDURE — 86803 HEPATITIS C AB TEST: CPT

## 2022-03-16 ASSESSMENT — ENCOUNTER SYMPTOMS
DIFFICULTY URINATING: 0
BOWEL INCONTINENCE: 0
NAUSEA: 0
BLOOD IN STOOL: 0
ABDOMINAL PAIN: 0
SMELL DISTURBANCE: 0
NECK MASS: 0
VOMITING: 0
SINUS PAIN: 0
TROUBLE SWALLOWING: 0
DIARRHEA: 0
FLANK PAIN: 0
TASTE DISTURBANCE: 0
BLOATING: 0
DYSURIA: 1
SINUS CONGESTION: 1
HOARSE VOICE: 0
HEMATURIA: 0
CONSTIPATION: 1
RECTAL PAIN: 1
HEARTBURN: 1
SORE THROAT: 1
JAUNDICE: 0

## 2022-03-17 ENCOUNTER — OFFICE VISIT (OUTPATIENT)
Dept: SURGERY | Facility: CLINIC | Age: 37
End: 2022-03-17
Attending: ADVANCED PRACTICE MIDWIFE
Payer: COMMERCIAL

## 2022-03-17 VITALS
WEIGHT: 170.1 LBS | BODY MASS INDEX: 30.14 KG/M2 | DIASTOLIC BLOOD PRESSURE: 89 MMHG | SYSTOLIC BLOOD PRESSURE: 130 MMHG | HEIGHT: 63 IN | OXYGEN SATURATION: 97 % | HEART RATE: 100 BPM

## 2022-03-17 DIAGNOSIS — K64.9 HEMORRHOIDS, UNSPECIFIED HEMORRHOID TYPE: ICD-10-CM

## 2022-03-17 PROCEDURE — 99203 OFFICE O/P NEW LOW 30 MIN: CPT | Performed by: SURGERY

## 2022-03-17 ASSESSMENT — PAIN SCALES - GENERAL: PAINLEVEL: NO PAIN (0)

## 2022-03-17 NOTE — PATIENT INSTRUCTIONS
1. Fiber supplement such as Metamucil, Citrucel, or Benefiber once to twice a day  2. MiraLax or Milk of Magnesia if still constipated  3. Drink 10 glasses of water a day  4. Follow up in 8 weeks.    Genny LARIOS [phone]

## 2022-03-17 NOTE — LETTER
"3/17/2022       RE: Arlyn Escalera  [address] [address]     Dear Colleague,    Thank you for referring your patient, Arlyn Escalera, to the [facility] [place] [facility]. Please see a copy of my visit note below.    Colon and Rectal Surgery Clinic Note    RE: Arlyn Escalera.  : 1985.  ANIBAL: 3/17/2022.    Reason for visit: hemorrhoids     HPI: Arlyn is a 37 year old female who presents today for hemorrhoids. She is s/p  section on 2021 with Dr. Fabi Buckley at [facility]. Since her  she reports having \"issues\" with hemorrhoids.     She had hemorrhoid issues after her first pregnancy and they resolved.  The issues have not resolved after this, her second pregnancy/birth.    She took metamucil at first but now only sporadically.  No prior colonoscopy.    She notices some pain with bowel movements.    She noticed some blood 1-2 times early on, but this has stopped.    Medical history:  1. Anxiety   2. Sleep laceration   3.  section     Surgical history:  1. Appendectomy   2.  section   2. 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 Father         MI mild at age 50     Seasonal/Environmental Allergies Father      Skin Cancer No family hx of      Glaucoma No family hx of      Macular Degeneration No family hx of      Melanoma No family hx of      Coronary Artery Disease Father 50.00     Heart Disease Father      Hypertension Father      Thyroid Disease Mother      Alcoholism Brother      Cancer Maternal Grandfather      Alcoholism Paternal Grandfather      Heart Disease Paternal Grandfather      No IBD or GI malignancy.    Medications:  Current Outpatient Medications   Medication Sig Dispense Refill     Docusate Calcium (STOOL SOFTENER PO)        fluticasone " (FLONASE) 50 MCG/ACT nasal spray Spray 2 sprays into both nostrils daily 1 g 5     nitroFURantoin macrocrystal-monohydrate (MACROBID) 100 MG capsule TAKE 1 CAPSULE BY MOUTH EVERY 12 HOURS WITH FOOD FOR 5 DAYS       polyethylene glycol (MIRALAX) 17 g packet Take 1 packet by mouth daily       Skin Protectants, Misc. (EUCERIN) cream Apply topically as needed for dry skin Or whatever size covered by insurance 454 g 1       Allergies:  Allergies   Allergen Reactions     Keppra Unknown     Levetiracetam Unknown     Morphine [Morphine Sulfate] Unknown       Social history:   Social History     Tobacco Use     Smoking status: Never Smoker     Smokeless tobacco: Never Used   Substance Use Topics     Alcohol use: Yes     Comment: Alcoholic Drinks/day: rare     Marital status: .    ROS:  A complete review of systems was performed with the patient and all systems negative except as per HPI.    Physical Examination:  Exam was chaperoned by Stephy Gore CMA  There were no vitals taken for this visit.  General: Well hydrated. No acute distress.  Abdomen: Soft, not distended  Perianal external examination:  Perianal skin: Intact with no excoriation or lichenification.  Lesions: No evidence of an external lesion, nodularity, or induration in the perianal region.  Eversion of buttocks: There was not evidence of an anal fissure. Details: N/A.  Skin tags or external hemorrhoids: Yes: right anterior external hemorrhoid, mildly inflamed and not thrombosed.  Digital rectal examination: Was performed.   Sphincter tone: Good.  Palpable lesions: No.  Other: None.  Bimanual examination: was not performed.    Anoscopy: Was performed.   Hemorrhoids: Mild right sided internal hemorrhoids; external component was worse.  Lesions: No    Procedures:  None.    Laboratory values reviewed:  Recent Labs   Lab Test 07/15/21  1142 06/18/21  1341 06/18/21  0438 06/18/21  0438 06/10/21  0310 06/10/21  0310   WBC  --   --   --  14.5*   < >  --     HGB 11.6*   < >  --  9.3*   < >  --    PLT  --   --   --  601*   < >  --    CR  --   --  0.78  --    < > 0.78   ALBUMIN  --   --   --   --   --  2.2*   BILITOTAL  --   --   --   --   --  0.6   ALKPHOS  --   --   --   --   --  132*   ALT  --   --   --   --   --  34   AST  --   --   --   --   --  20   INR  --   --  1.04  --    < >  --     < > = values in this interval not displayed.       ASSESSMENT  38 y/o lady with mixed hemorrhoids.    Risks, benefits, and alternatives of operative treatment were thoroughly discussed with the patient, he/she understands these well and agrees to proceed.    PLAN  1. Fiber/water  2. F/u in 8 weeks    45 minutes spent on the date of the encounter doing chart review, history and exam, imaging review, documentation and further activities as noted above.      Referring Provider:  Mónica Crabtree CNM  [address] [address]     Primary Care Provider:  Nilam Conde, thank you for allowing me to participate in the care of your patient.      Sincerely,    Curry Ortiz MD

## 2022-03-17 NOTE — NURSING NOTE
"Chief Complaint   Patient presents with     Hemorrhoids       Vitals:    03/17/22 0840   BP: 130/89   BP Location: Left arm   Patient Position: Sitting   Cuff Size: Adult Regular   Pulse: 100   SpO2: 97%   Weight: 170 lb 1.6 oz   Height: 5' 3\"       Body mass index is 30.13 kg/m .    Stephy Gore CMA    "

## 2022-07-25 ENCOUNTER — HOSPITAL ENCOUNTER (EMERGENCY)
Facility: CLINIC | Age: 37
Discharge: HOME | End: 2022-07-25
Attending: EMERGENCY MEDICINE | Admitting: EMERGENCY MEDICINE
Payer: COMMERCIAL

## 2022-07-25 VITALS
HEIGHT: 63 IN | OXYGEN SATURATION: 99 % | DIASTOLIC BLOOD PRESSURE: 83 MMHG | WEIGHT: 170 LBS | BODY MASS INDEX: 30.12 KG/M2 | SYSTOLIC BLOOD PRESSURE: 131 MMHG | RESPIRATION RATE: 16 BRPM | HEART RATE: 110 BPM | TEMPERATURE: 98.4 F

## 2022-07-25 DIAGNOSIS — K62.5 BRBPR (BRIGHT RED BLOOD PER RECTUM): ICD-10-CM

## 2022-07-25 DIAGNOSIS — K64.4 EXTERNAL HEMORRHOIDS: ICD-10-CM

## 2022-07-25 PROCEDURE — 99283 EMERGENCY DEPT VISIT LOW MDM: CPT

## 2022-07-25 ASSESSMENT — ENCOUNTER SYMPTOMS
LIGHT-HEADEDNESS: 0
CONSTIPATION: 1
DIZZINESS: 0
BLOOD IN STOOL: 1

## 2022-07-26 NOTE — ED PROVIDER NOTES
EMERGENCY DEPARTMENT ENCOUNTER      NAME: Arlyn Escalera  AGE: 37 year old female  YOB: 1985  MRN: [id number]  EVALUATION DATE & TIME: 7/25/2022 10:04 PM    PCP: Nilam Conde    ED PROVIDER: Sb Shea MD     Chief Complaint   Patient presents with     Hemorrhoids     FINAL IMPRESSION:  1. BRBPR (bright red blood per rectum)    2. External hemorrhoids      ED COURSE & MEDICAL DECISION MAKING:    Pertinent Labs & Imaging studies reviewed. (See chart for details)  37 year old female presents to the Emergency Department for evaluation of bright red blood per rectum.  Patient reports after a complicated delivery approximately 1 year prior she developed external hemorrhoids and potentially internal hemorrhoids.  She was seen by colorectal surgery who recommended increased fiber but no other interventions at that time.  Patient recently had had some increased constipation and then noted some bright red blood per rectum.  That escalated this evening and now she presents the emergency department for evaluation.  No lightheadedness no dizziness no weakness.  She is not currently bleeding.  No pain abdominally rectally at this time.  On examination well-appearing adult female did not appear to be in acute distress she had no abdominal discomfort to palpation.  With nurse chaperone I did examination of rectally.  She had multiple external hemorrhoids.  No clear source for the bleeding was identified.  No evidence of current active bleeding.  I discussed her options with the patient.  I would favor this is likely an acute exacerbation of her chronic hemorrhoids either one of her external hemorrhoids previously bleeding or an internal hemorrhoid.  We discussed performing laboratory testing and CT scan imaging but at this point the patient prefers to hold off on that and monitor her symptoms with plan for close follow-up with her colorectal team.  I think based on her overall age no current active  bleeding evident her overall health this is reasonable we discussed starting some agents to soften her stool to try to prevent recurrent bleeding and I spent time going through return precautions the patient prior to discharge.  She was comfortable this plan of care.     10:37 PM I met with the patient, obtained history, performed an initial exam, and discussed options and plan for diagnostics and treatment here in the ED.    11:13 PM We discussed the plan for discharge and the patient is agreeable. Reviewed supportive cares, symptomatic treatment, outpatient follow up, and reasons to return to the Emergency Department. Patient to be discharged by ED RN.     At the conclusion of the encounter I discussed the results of all of the tests and the disposition. The questions were answered. The patient or family acknowledged understanding and was agreeable with the care plan.       MEDICATIONS GIVEN IN THE EMERGENCY:  Medications - No data to display    NEW PRESCRIPTIONS STARTED AT TODAY'S ER VISIT  There are no discharge medications for this patient.         =================================================================    HPI    Patient information was obtained from: Patient    Use of : N/A     Arlyn Escalera is a 37 year old female with a pertinent history of s/p low transverse , gestational hypertension who presents to this ED  for evaluation of hemorrhoids.    The patient reports that for starting around  she had noticed blood in her stools.  Last night (), she noticed that the amount of blood in her stool had increased. She describes the blood as bright red in color.     Beginning last week, she had also developed constipation and describes her stool as bulky and sticky. She denies any lightheadedness or dizziness.  Also denies any history of colostomy.    The patient reports a personal history of hemorrhoids, and had went to colorectal on  and was told to take fiber, but admits  she hasn't been taking fiber regularly. Patient also has a history of s/p   and had a blood transfusion as wells as DNC. No other complaints at this time.    REVIEW OF SYSTEMS   Review of Systems   Gastrointestinal: Positive for blood in stool (bright red) and constipation.   Neurological: Negative for dizziness and light-headedness.   All other systems reviewed and are negative.       PAST MEDICAL HISTORY:  Past Medical History:   Diagnosis Date     Anxiety     mostly in college     Gestational hypertension, third trimester 2019     S/P primary low transverse       Spleen laceration 2002    Playing hockey     Varicella     Chickenpox as a child       PAST SURGICAL HISTORY:  Past Surgical History:   Procedure Laterality Date     APPENDECTOMY  2000      SECTION N/A 2021    Procedure:  SECTION;  Surgeon: Fabi Buckley DO;  Location: [facility] L[facility] OR;  Service: Obstetrics     DILATION AND CURETTAGE       OTHER SURGICAL HISTORY  2001    Lacerated spleen repairPlaying hockey           CURRENT MEDICATIONS:    No current outpatient medications on file.      ALLERGIES:  Allergies   Allergen Reactions     Keppra Unknown     Levetiracetam Unknown     Morphine [Morphine Sulfate] Unknown       FAMILY HISTORY:  Family History   Problem Relation Age of Onset     Thyroid Disease Mother      C.A.D. Father         MI mild at age 50     Seasonal/Environmental Allergies Father      Coronary Artery Disease Father 50     Heart Disease Father      Hypertension Father      Alcoholism Brother      Cancer Maternal Grandfather      Alcoholism Paternal Grandfather      Heart Disease Paternal Grandfather      Skin Cancer No family hx of      Glaucoma No family hx of      Macular Degeneration No family hx of      Melanoma No family hx of        SOCIAL HISTORY:   Social History     Socioeconomic History     Marital status:      Spouse name: Tan     Number of children:  "1     Years of education: College Grad   Occupational History     Occupation:      Employer: [employer]   Tobacco Use     Smoking status: Never Smoker     Smokeless tobacco: Never Used   Vaping Use     Vaping Use: Never used   Substance and Sexual Activity     Alcohol use: Yes     Comment: Alcoholic Drinks/day: rare     Drug use: No     Sexual activity: Yes     Partners: Male     Comment: single   Other Topics Concern     Parent/sibling w/ CABG, MI or angioplasty before 65F 55M? No   Social History Narrative    Social Documentation:  Has a dog        Balanced Diet: YES    Calcium intake: 2-3 per day    Caffeine: 1-2 per day    Exercise:  type of activity cardio;  3-4 times per week    Sunscreen: Yes    Seatbelts:  Yes    Self Breast Exam:  No    Self Testicular Exam: na    Physical/Emotional/Sexual Abuse: No    Do you feel safe in your environment? Yes        Cholesterol screen up to date: no fam hx    Eye Exam up to date: Yes    Dental Exam up to date: Yes    Pap smear up to date: Do today.  Last was 2008, nml    Mammogram up to date: Does Not Apply    Dexa Scan up to date: Does Not Apply    Colonoscopy up to date: Does Not Apply    Immunizations up to date: unsure of when    Glucose screen if over 40:  na                       VITALS:  /83   Pulse 110   Temp 98.4  F (36.9  C)   Resp 16   Ht 1.6 m (5' 3\")   Wt 77.1 kg (170 lb)   SpO2 99%   BMI 30.11 kg/m      PHYSICAL EXAM    PHYSICAL EXAM    VITAL SIGNS: /83   Pulse 110   Temp 98.4  F (36.9  C)   Resp 16   Ht 1.6 m (5' 3\")   Wt 77.1 kg (170 lb)   SpO2 99%   BMI 30.11 kg/m    Constitutional:  Well developed, well nourished, NAD  EYES: Conjunctivae clear, no discharge  HENT: Atraumatic, normocephalic, bilateral external ears normal.  Oropharynx moist. Nose normal.   Neck: Normal ROM , Supple   Respiratory:  No respiratory distress, normal nonlabored respirations.   Cardiovascular:  Distal perfusion appears intact  Abdomen: " Nontender abdominal examination with no palpable masses  Genitourinary: Conducted with EDT chaperone, multiple external hemorrhoids no clear source for patient's bleeding was evident, there is no evidence of active bleeding.  Potentially a small anal fissure noted in the 3 o'clock position no other abnormalities appreciated.  Musculoskeletal:  No edema appreciated, Good range of motion in all major joints.   Integument:  Warm, Dry, No erythema, No rash.   Neurologic:  Alert and oriented. No focal deficits noted.  Ambulatory  Psychiatric:  Affect normal, Judgment normal, Mood normal.    I, Gabriel Herrera , am serving as a scribe to document services personally performed by Sb Shea MD based on my observation and the provider's statements to me. I, Sb Shea MD, attest that Gabriel Herrera is acting in a scribe capacity, has observed my performance of the services and has documented them in accordance with my direction.    Sb Shea MD   [facility] EMERGENCY ROOM  [address] [id number] [phone]     Sb Shea MD  07/25/22 9953

## 2022-07-26 NOTE — ED TRIAGE NOTES
Here for blood in stool, that describes as bright red   Painful bowel movements  Hx of hemorrhoids      Triage Assessment     Row Name 07/25/22 2121       Triage Assessment (Adult)    Airway WDL WDL       Respiratory WDL    Respiratory WDL WDL       Skin Circulation/Temperature WDL    Skin Circulation/Temperature WDL WDL       Cardiac WDL    Cardiac WDL WDL       Peripheral/Neurovascular WDL    Peripheral Neurovascular WDL WDL       Cognitive/Neuro/Behavioral WDL    Cognitive/Neuro/Behavioral WDL WDL

## 2022-07-26 NOTE — DISCHARGE INSTRUCTIONS
Please monitor your bleeding at home.  If it escalates or you develop additional symptoms such as lightheadedness dizziness increasing weakness or other concerns please return to the emergency department for repeat evaluation.  I think that your bleeding is secondary to hemorrhoids likely both internal and external mediated.  This should be followed up with your colorectal team.  We have placed a urgent referral request to the emergency [place] where you are followed.  Recommend over-the-counter stool softeners and laxatives to keep your stool soft as this will help prevent rebleeding.

## 2022-08-02 ENCOUNTER — OFFICE VISIT (OUTPATIENT)
Dept: DERMATOLOGY | Facility: CLINIC | Age: 37
End: 2022-08-02
Attending: NURSE PRACTITIONER
Payer: COMMERCIAL

## 2022-08-02 DIAGNOSIS — Z12.83 SKIN EXAM, SCREENING FOR CANCER: ICD-10-CM

## 2022-08-02 PROCEDURE — 99203 OFFICE O/P NEW LOW 30 MIN: CPT | Mod: GC | Performed by: DERMATOLOGY

## 2022-08-02 ASSESSMENT — PAIN SCALES - GENERAL: PAINLEVEL: NO PAIN (0)

## 2022-08-02 NOTE — PATIENT INSTRUCTIONS
Patient Education     Checking for Skin Cancer  You can find cancer early by checking your skin each month. There are 3 kinds of skin cancer. They are melanoma, basal cell carcinoma, and squamous cell carcinoma. Doing monthly skin checks is the best way to find new marks or skin changes. Follow the instructions below for checking your skin.   The ABCDEs of checking moles for melanoma   Check your moles or growths for signs of melanoma using ABCDE:   Asymmetry: the sides of the mole or growth don t match  Border: the edges are ragged, notched, or blurred  Color: the color within the mole or growth varies  Diameter: the mole or growth is larger than 6 mm (size of a pencil eraser)  Evolving: the size, shape, or color of the mole or growth is changing (evolving is not shown in the images below)    Checking for other types of skin cancer  Basal cell carcinoma or squamous cell carcinoma have symptoms such as:     A spot or mole that looks different from all other marks on your skin  Changes in how an area feels, such as itching, tenderness, or pain  Changes in the skin's surface, such as oozing, bleeding, or scaliness  A sore that does not heal  New swelling or redness beyond the border of a mole    Who s at risk?  Anyone can get skin cancer. But you are at greater risk if you have:   Fair skin, light-colored hair, or light-colored eyes  Many moles or abnormal moles on your skin  A history of sunburns from sunlight or tanning beds  A family history of skin cancer  A history of exposure to radiation or chemicals  A weakened immune system  If you have had skin cancer in the past, you are at risk for recurring skin cancer.   How to check your skin  Do your monthly skin checkups in front of a full-length mirror. Check all parts of your body, including your:   Head (ears, face, neck, and scalp)  Torso (front, back, and sides)  Arms (tops, undersides, upper, and lower armpits)  Hands (palms, backs, and fingers, including  under the nails)  Buttocks and genitals  Legs (front, back, and sides)  Feet (tops, soles, toes, including under the nails, and between toes)  If you have a lot of moles, take digital photos of them each month. Make sure to take photos both up close and from a distance. These can help you see if any moles change over time.   Most skin changes are not cancer. But if you see any changes in your skin, call your doctor right away. Only he or she can diagnose a problem. If you have skin cancer, seeing your doctor can be the first step toward getting the treatment that could save your life.   [employer] last reviewed this educational content on 4/1/2019 2000-2020 [employer]. [address] [place] [address], [place] [address]. All rights reserved. This information is not intended as a substitute for professional medical care. Always follow your healthcare professional's instructions.       When should I call my doctor?  If you are worsening or not improving, please, contact us or seek urgent care as noted below.     Who should I call with questions (adults)?  [facility] (adult and pediatric): [phone]  [facility] (adult): [phone]  For urgent needs outside of business hours call the [facility] at [phone] and ask for the dermatology resident on call to be paged  If this is a medical emergency and you are unable to reach an ER, Call [phone]    Who should I call with questions (pediatric)?  [facility]- Pediatric Dermatology  Dr. Rody Marmolejo, Dr. Javier Pollock, Dr. Awilda Love, LUIS Katz, Dr. Stephanie Kim, Dr. Farzaneh Salter & Dr. Timo Portillo  Non-urgent nurse triage line; [phone]- Ashly and Evy LARIOS Care Coordinatornathaly Leach (/Complex ) [phone]    If you need a prescription refill, please contact your pharmacy. Refills are approved or denied by our  "Physicians during normal business hours, Monday through Fridays  Per office policy, refills will not be granted if you have not been seen within the past year (or sooner depending on your child's condition)    Scheduling Information:  Pediatric Appointment Scheduling and Call Center [phone]  Radiology Scheduling- [phone]  Sedation Unit Scheduling- [phone]  [facility] Scheduling- General [phone]; Pediatric Dermatology [phone]  Main  Services: [phone]  Macanese: [phone]  Saudi Arabian: [phone]  Hmong/Parish/Nash: [phone]  Preadmission Nursing Department Fax Number: [phone] (Fax all pre-operative paperwork to this number)    For urgent matters arising during evenings, weekends, or holidays that cannot wait for normal business hours please call [phone] and ask for the dermatology resident on call to be paged.    Sunscreen   What does \"broad spectrum mean\"?   The best sunscreens protect against all UVB (Burning) rays and UVA (Aging, cAncer, tAnning) rays.    What does SPF mean?   SPF stands for  Sun Protection Factor  and represents the ability to screen only UVB (burning) rays. UVB rays are mostly blocked in all sunscreens, but only those that contain titanium dioxide, zinc oxide, mexoryl or Parsol 1789 (avobenzone) block the UVA spectrum. Zinc oxide is the best of all. Even though a sunscreen is labeled  UVA/UVB Protection  that is not entirely accurate because even partial protection allows this label!    What SPF should I chose?   Aim to get a sunscreen that is at least sun protection factor (SPF) 30. SPF 15 provides about 92-93% coverage, SPF 30 about 95-97% coverage, and SPF 45 about 98% coverage. That is to say, SPF 30 is not twice as good as SPF 15; think of it as a curve graph. If covering your whole body, you should be using 30 grams, or one ounce, which is how much is in one shot glass! That s a THICK layer!    UVA BLOCKERS:   Make sure your sunscreen " has one of these active ingredients!   Everything else in the  active ingredients  box of a sunscreen label blocks UVB only.   Zinc Oxide (preferred)   Titanium dioxide   Parsol 1789 (avobenzone)   Elta MD: available at [facility] and [facility]   Examples of some good sunscreens*   [email or web address]   Aveeno   Baby [facility]roads sunscreens   [place]   Blue Lizard   BullFrog   CaliforniaBaby   Coppertone Spectra3   [facility]   Jb   Fallene/TotalBlock   Neutrogena   NoAd   Vanicream   WaterBabies  Sticks work great, like Neutrogena pure and free baby SPF 60 stick    Darker Skin Types & Sunscreen  Patients with darker skin colors tend to like tinted sunscreens better as they appear less chalky on the skin. Many BB Creams are now available but make sure the sunscreen SPF is at least 30! Here are some brands of tinted sunscreens:  Neutrogenia Tinted  EltaMD  La Roche Posay Anthelios 60 Ultra Light Sunscreen Fluid  Cerave Sunscreen SPF 50 Face Lotion Invisible Zinc  Clarins UV Plus sunscreen Multiprotection Tint  Dr Jart + Every Sun Day sunscreen  Coppertone ClearlySheer Lotion SPF 50  Up & Up (Target) Sheer Dry-touch Lotion SPF 30  Palmers Cocoa Butter Formula Evertone Suncare Sunscreen Stick SPF 30  Per-fect Beauty skin Perfection Plus with SPF 30  Junetics Pure Energy Brightening Day Cream with Broad Spectrum SPF 50  Clinque SPF 30 Mineral Sunscreen Fluid for Face  Mehrdad Multicorrection 5 in 1 Chest, Neck and Face Cream with SPF 30  Cover FX Clear Cover Invisible Sunscreen Broad Spectrum SPF 30  Blessing Deyanira Age Perfect Hydra-Nutrition Facial Oil SPF 30  Solbar Shield SPF 40  Tropical Sands All Natural SPF 50  * Note, this list is not meant to be comprehensive, just trying to pull together some names that might be helpful to you. If they are not at a local store, they can be found on-line for order. EACH NAMEBRAND MAKES MULTIPLE TYPES SO YOU STILL HAVE TO CHECK FOR ONE OF THE FOUR INGREDIENTS LISTED IN THE  LEFT COLUMN!!!   Combination sunscreen-insect repellants are not recommended as sunscreen needs to be reapplied every 2 hours; insect repellant does not, and that would lead to too much DEET exposure.   Sunscreen is not recommended for infants under the age of 6 months. Use clothing, shade and sun avoidance for small infants. Sunscreen clothing and hats are also important for people of all ages. Note that [employer]entry is a company based out of [place], [place]! Other good items can be found in stores and on-line.   Sunscreen sprays are okay for RE-APPLICATION only. Most people do not spray enough on to get good enough protection. Recommendation: Use a lotion-based sunscreen to get a good first/base layer.   Vitamin D: We get vitamin D through the skin. If you do not get enough sun in the summer to get tan lines, you should take a vitamin D supplement: 400units for children and 1000units for adults per day.   Good daily facial moisturizers with sunscreen:   [place] Block Sheer   Anthelios by LaRochePosay   Oil of Olay Complete Defense for sensitive skin   Sunscreen Recommendations for Sensitive Skin    The following sunscreens may be better for your child's sensitive skin. The main active ingredients are inert, either titanium dioxide or zinc oxide. These ingredients are less irritating than chemical sunscreens.   1) Aveeno Active Natural Protection Mineral Block Lotion SPF 30   2) Aveeno Baby Natural Protection Face Stick SPF 50+   3) Banana Boat Natural Reflect (baby or kids) SPF 50+   4) Haakon's Bees Chemical-Free Sunscreen SPF 30   5) Blue Lizard Baby SPF 30+   6) Blue Lizard for Sensitive Skin SPF 30+   7) Cotz Pure SPF 30   8) Cotz Face SPF 40   9) Cotz 20% Zinc SPF 35   10) CVS Sensitive Skin SPF 30   11) CVS Baby Lotion Sunscreen SPF 60+   12) Mustella Broad Spectrum SPF 50+/Mineral Sunscreen Stick   13) Neutrogena Sensitive Skin SPF 30   14) Neutrogena Sensitive Skin SPF 60+   15) PreSun Sensitive  Sunblock SPF 28   16) Vanicream Sunscreen for Sensitive Skin SPF 60   17) Walgreen's Sensitive Skin SPF 70   Many local pharmacies and [employer]eb[employer] stores carry some of these options or you may purchase them online at www.[employer] or www.[employer].     Sun protective Clothing:  [email or web address]  [email or web address]  [email or web address]

## 2022-08-02 NOTE — PROGRESS NOTES
[facility] Dermatology Note  Encounter Date: Aug 2, 2022  Office Visit     Dermatology Problem List:  FBSE 08/2022  1. Atopic Dermatitis:  OTC hydrocortisone, Eucerin cream  - Previous Tx: Lidex 0.05% solution, TMC 0.1% ointment, Eucerin cream   2. Transitioning nevus, L shoulder  - Patient monitoring for changes at home    ____________________________________________    Assessment & Plan:      # Transitioning nevus, L shoulder  Likely compound becoming intradermal. Possibly collision. Appears benign on exam today.  - ABCDEs of melanoma self-checks reviewed with patient, she will monitor for changes and call us to be seen if needed  - sun protection reviewed with SPF30+ and sun protective clothing (handout provided in AVS)    # Atopic dermatitis, well controlled  - Continue OTC hydrocortisone and Eucerin cream    # Benign skin findings include lentigines, benign melanocytic nevi, cherry angiomas and dermatofibroma  * chronic uncomplicated  - Patient counseled regarding lesions benign nature, no treatment is indicated at this time, will monitor for any clinical changes      Procedures Performed:   None    Follow-up: 1-2 year(s) in-person, or earlier for new or changing lesions    Staff and Resident and Scribe:     Hermes Henao MD  Medicine/Dermatology PGY-2  *[phone]    Scribe Disclosure:  I, Agustin Kelley, am serving as a scribe to document services personally performed by Sesar Torres MD based on data collection and the provider's statements to me.     Staff Physician Comments:   I saw and evaluated the patient with the resident and I agree with the assessment and plan.  I was present for the examination. I have made edits if needed.    Sesar Torres MD  Staff Dermatologist and Dermatopathologist  , Department of Dermatology   ____________________________________________    CC: No chief complaint on file.    HPI:  Ms. Arlyn Escalera is a(n) 37 year old female  "who presents today as a new patient for skin check. Last seen in dermatology by Zeynep Arellano PA-C, on 11/23/2018, at which time patient was provided refill of Lidex 0.05% solution for treatment of atopic dermatitis.    - Here for \"preventative skin check\"  - No particular spots of concern  - No new, growing, changing, bleeding, itching, burning spots or nonhealing wounds on her body  - Yes tanning bed use, 30x in high school  - No blistering sunburns  - Uses sunscreen and seeks shade when outside[place]  - No personal or family hx skin cancers  - Does have hx atopic dermatitis, only has \"a little bit now behind my knee, but really it just kind of ebbs and flows. I'm not particularly worried about it\" uses OTC cortisone cream    Patient is otherwise feeling well, without additional skin concerns.    Labs Reviewed:  N/A    Physical Exam:  Vitals: There were no vitals taken for this visit.  SKIN: Full skin, which includes the head/face, both arms, chest, back, abdomen,both legs, genitalia and/or groin buttocks, digits and/or nails, was examined.  - On the upper L arm there is 3mm flesh-toned papule with eccentric pigmentation and a benign-appearing pigment network with pseudohorn cysts within  - There is a firm tan/flesh colored papule ith starburst pattern on dermoscopy that dimples with lateral pressure on the R dorsal forearm  - There are dome shaped bright red papules on the trunk.   - Multiple regular brown pigmented macules and papules are identified on the trunk and extremities.   - Scattered brown macules on sun exposed areas.  - No other lesions of concern on areas examined.     Medications:  No current outpatient medications on file.     No current facility-administered medications for this visit.      Past Medical History:   Patient Active Problem List   Diagnosis     CARDIOVASCULAR SCREENING; LDL GOAL LESS THAN 160     Anxiety     Allergic conjunctivitis     Seasonal allergies "     Blepharitis, unspecified laterality     Eczema, unspecified type     BMI 29.0-29.9,adult     History of  section     Retained products of conception with hemorrhage     Past Medical History:   Diagnosis Date     Anxiety     mostly in college     Gestational hypertension, third trimester 2019     S/P primary low transverse       Spleen laceration 2002    Playing hockey     Varicella     Chickenpox as a child        CC Lynn Law, APRN CNP  [address] [place] [address] on close of this encounter.

## 2022-08-02 NOTE — NURSING NOTE
Chief Complaint   Patient presents with     Derm Problem     Skin check, no concerns     Fabby ROSENTHAL, EMT  Dermatology/Dermatology Surgery  [phone]

## 2022-08-02 NOTE — LETTER
8/2/2022       RE: Arlyn Escalera  [address] [address]     Dear Colleague,    Thank you for referring your patient, Arlyn Escalera, to the [facility] DERMAT[facility] [place] at [facility]. Please see a copy of my visit note below.    [facility] Dermatology Note  Encounter Date: Aug 2, 2022  Office Visit     Dermatology Problem List:  FBSE 08/2022  1. Atopic Dermatitis:  OTC hydrocortisone, Eucerin cream  - Previous Tx: Lidex 0.05% solution, TMC 0.1% ointment, Eucerin cream   2. Transitioning nevus, L shoulder  - Patient monitoring for changes at home    ____________________________________________    Assessment & Plan:      # Transitioning nevus, L shoulder  Likely compound becoming intradermal. Possibly collision. Appears benign on exam today.  - ABCDEs of melanoma self-checks reviewed with patient, she will monitor for changes and call us to be seen if needed  - sun protection reviewed with SPF30+ and sun protective clothing (handout provided in AVS)    # Atopic dermatitis, well controlled  - Continue OTC hydrocortisone and Eucerin cream    # Benign skin findings include lentigines, benign melanocytic nevi, cherry angiomas and dermatofibroma  * chronic uncomplicated  - Patient counseled regarding lesions benign nature, no treatment is indicated at this time, will monitor for any clinical changes      Procedures Performed:   None    Follow-up: 1-2 year(s) in-person, or earlier for new or changing lesions    Staff and Resident and Scribe:     Hermes Henao MD  Medicine/Dermatology PGY-2  *[phone]    Scribe Disclosure:  I, Agustin Kelley, am serving as a scribe to document services personally performed by Sesar Torres MD based on data collection and the provider's statements to me.     Staff Physician Comments:   I saw and evaluated the patient with the resident and I agree with the assessment and plan.  I  "was present for the examination. I have made edits if needed.    Sesar Torres MD  Staff Dermatologist and Dermatopathologist  , Department of Dermatology   ____________________________________________    CC: No chief complaint on file.    HPI:  Ms. Arlyn Escalera is a(n) 37 year old female who presents today as a new patient for skin check. Last seen in dermatology by Zeynep Arellano PA-C, on 11/23/2018, at which time patient was provided refill of Lidex 0.05% solution for treatment of atopic dermatitis.    - Here for \"preventative skin check\"  - No particular spots of concern  - No new, growing, changing, bleeding, itching, burning spots or nonhealing wounds on her body  - Yes tanning bed use, 30x in high school  - No blistering sunburns  - Uses sunscreen and seeks shade when outside[place]  - No personal or family hx skin cancers  - Does have hx atopic dermatitis, only has \"a little bit now behind my knee, but really it just kind of ebbs and flows. I'm not particularly worried about it\" uses OTC cortisone cream    Patient is otherwise feeling well, without additional skin concerns.    Labs Reviewed:  N/A    Physical Exam:  Vitals: There were no vitals taken for this visit.  SKIN: Full skin, which includes the head/face, both arms, chest, back, abdomen,both legs, genitalia and/or groin buttocks, digits and/or nails, was examined.  - On the upper L arm there is 3mm flesh-toned papule with eccentric pigmentation and a benign-appearing pigment network with pseudohorn cysts within  - There is a firm tan/flesh colored papule ith starburst pattern on dermoscopy that dimples with lateral pressure on the R dorsal forearm  - There are dome shaped bright red papules on the trunk.   - Multiple regular brown pigmented macules and papules are identified on the trunk and extremities.   - Scattered brown macules on sun exposed areas.  - No other lesions of concern on areas " examined.     Medications:  No current outpatient medications on file.     No current facility-administered medications for this visit.      Past Medical History:   Patient Active Problem List   Diagnosis     CARDIOVASCULAR SCREENING; LDL GOAL LESS THAN 160     Anxiety     Allergic conjunctivitis     Seasonal allergies     Blepharitis, unspecified laterality     Eczema, unspecified type     BMI 29.0-29.9,adult     History of  section     Retained products of conception with hemorrhage     Past Medical History:   Diagnosis Date     Anxiety     mostly in college     Gestational hypertension, third trimester 2019     S/P primary low transverse       Spleen laceration 2002    Playing hockey     Varicella     Chickenpox as a child        CC Lynn Law, APRN CNP  [address] [place] [address] on close of this encounter.

## 2023-01-14 ENCOUNTER — HEALTH MAINTENANCE LETTER (OUTPATIENT)
Age: 38
End: 2023-01-14

## 2023-04-03 PROBLEM — O34.40 MATERNAL CARE FOR OTHER ABNORMALITIES OF CERVIX, UNSPECIFIED TRIMESTER: Status: RESOLVED | Noted: 2021-06-02 | Resolved: 2021-06-02

## 2023-04-23 ENCOUNTER — HEALTH MAINTENANCE LETTER (OUTPATIENT)
Age: 38
End: 2023-04-23

## 2023-09-19 ENCOUNTER — LAB REQUISITION (OUTPATIENT)
Dept: LAB | Facility: CLINIC | Age: 38
End: 2023-09-19

## 2023-09-19 DIAGNOSIS — Z13.1 ENCOUNTER FOR SCREENING FOR DIABETES MELLITUS: ICD-10-CM

## 2023-09-19 DIAGNOSIS — Z12.4 ENCOUNTER FOR SCREENING FOR MALIGNANT NEOPLASM OF CERVIX: ICD-10-CM

## 2023-09-19 DIAGNOSIS — Z13.220 ENCOUNTER FOR SCREENING FOR LIPOID DISORDERS: ICD-10-CM

## 2023-09-19 PROCEDURE — 80061 LIPID PANEL: CPT | Performed by: NURSE PRACTITIONER

## 2023-09-19 PROCEDURE — G0145 SCR C/V CYTO,THINLAYER,RESCR: HCPCS | Performed by: NURSE PRACTITIONER

## 2023-09-19 PROCEDURE — 83036 HEMOGLOBIN GLYCOSYLATED A1C: CPT | Performed by: NURSE PRACTITIONER

## 2023-09-19 PROCEDURE — 87624 HPV HI-RISK TYP POOLED RSLT: CPT | Performed by: NURSE PRACTITIONER

## 2023-09-20 LAB
CHOLEST SERPL-MCNC: 139 MG/DL
HBA1C MFR BLD: 5.2 %
HDLC SERPL-MCNC: 53 MG/DL
LDLC SERPL CALC-MCNC: 66 MG/DL
NONHDLC SERPL-MCNC: 86 MG/DL
TRIGL SERPL-MCNC: 102 MG/DL

## 2023-09-22 LAB
BKR LAB AP GYN ADEQUACY: NORMAL
BKR LAB AP GYN INTERPRETATION: NORMAL
BKR LAB AP HPV REFLEX: NORMAL
BKR LAB AP LMP: NORMAL
BKR LAB AP PREVIOUS ABNL DX: NORMAL
BKR LAB AP PREVIOUS ABNORMAL: NORMAL
PATH REPORT.COMMENTS IMP SPEC: NORMAL
PATH REPORT.COMMENTS IMP SPEC: NORMAL
PATH REPORT.RELEVANT HX SPEC: NORMAL

## 2023-09-25 LAB
HUMAN PAPILLOMA VIRUS 16 DNA: NEGATIVE
HUMAN PAPILLOMA VIRUS 18 DNA: NEGATIVE
HUMAN PAPILLOMA VIRUS FINAL DIAGNOSIS: NORMAL
HUMAN PAPILLOMA VIRUS OTHER HR: NEGATIVE

## 2023-10-03 ENCOUNTER — HOSPITAL ENCOUNTER (EMERGENCY)
Facility: CLINIC | Age: 38
Discharge: HOME | End: 2023-10-03
Attending: STUDENT IN AN ORGANIZED HEALTH CARE EDUCATION/TRAINING PROGRAM | Admitting: STUDENT IN AN ORGANIZED HEALTH CARE EDUCATION/TRAINING PROGRAM
Payer: COMMERCIAL

## 2023-10-03 VITALS
WEIGHT: 172 LBS | BODY MASS INDEX: 30.48 KG/M2 | HEIGHT: 63 IN | SYSTOLIC BLOOD PRESSURE: 123 MMHG | RESPIRATION RATE: 16 BRPM | OXYGEN SATURATION: 97 % | TEMPERATURE: 98 F | HEART RATE: 69 BPM | DIASTOLIC BLOOD PRESSURE: 75 MMHG

## 2023-10-03 DIAGNOSIS — K22.89 ESOPHAGEAL PAIN: ICD-10-CM

## 2023-10-03 PROCEDURE — 250N000011 HC RX IP 250 OP 636: Performed by: STUDENT IN AN ORGANIZED HEALTH CARE EDUCATION/TRAINING PROGRAM

## 2023-10-03 PROCEDURE — 99284 EMERGENCY DEPT VISIT MOD MDM: CPT | Mod: 25

## 2023-10-03 PROCEDURE — 250N000013 HC RX MED GY IP 250 OP 250 PS 637: Performed by: STUDENT IN AN ORGANIZED HEALTH CARE EDUCATION/TRAINING PROGRAM

## 2023-10-03 PROCEDURE — 96374 THER/PROPH/DIAG INJ IV PUSH: CPT

## 2023-10-03 PROCEDURE — 250N000009 HC RX 250: Performed by: STUDENT IN AN ORGANIZED HEALTH CARE EDUCATION/TRAINING PROGRAM

## 2023-10-03 RX ORDER — MAGNESIUM HYDROXIDE/ALUMINUM HYDROXICE/SIMETHICONE 120; 1200; 1200 MG/30ML; MG/30ML; MG/30ML
15 SUSPENSION ORAL ONCE
Status: COMPLETED | OUTPATIENT
Start: 2023-10-03 | End: 2023-10-03

## 2023-10-03 RX ORDER — LIDOCAINE HYDROCHLORIDE 20 MG/ML
10 SOLUTION OROPHARYNGEAL ONCE
Status: COMPLETED | OUTPATIENT
Start: 2023-10-03 | End: 2023-10-03

## 2023-10-03 RX ADMIN — GLUCAGON 1 MG: 1 INJECTION, POWDER, LYOPHILIZED, FOR SOLUTION INTRAMUSCULAR; INTRAVENOUS at 11:33

## 2023-10-03 RX ADMIN — ALUMINUM HYDROXIDE, MAGNESIUM HYDROXIDE, AND DIMETHICONE 15 ML: 200; 20; 200 SUSPENSION ORAL at 12:29

## 2023-10-03 RX ADMIN — ANTACID/ANTIFLATULENT 4 G: 380; 550; 10; 10 GRANULE, EFFERVESCENT ORAL at 11:34

## 2023-10-03 RX ADMIN — LIDOCAINE HYDROCHLORIDE 10 ML: 20 SOLUTION ORAL; TOPICAL at 12:29

## 2023-10-03 ASSESSMENT — ACTIVITIES OF DAILY LIVING (ADL)
ADLS_ACUITY_SCORE: 35
ADLS_ACUITY_SCORE: 33

## 2023-10-03 NOTE — ED TRIAGE NOTES
Pt arrives to ED with c/o a piece of steak caught in her throat since last night. Pt has been drinking carbonated drinks and thought it helped but pt today still feels something stuck. Able to tolerate drinks has not have any solid foods today. No issues with breathing no chest pain. No cough. Pt feels like she needs to keep clearing her throat.      Triage Assessment       Row Name 10/03/23 1039       Triage Assessment (Adult)    Airway WDL WDL       Respiratory WDL    Respiratory WDL WDL       Skin Circulation/Temperature WDL    Skin Circulation/Temperature WDL WDL       Cardiac WDL    Cardiac WDL WDL       Peripheral/Neurovascular WDL    Peripheral Neurovascular WDL WDL       Cognitive/Neuro/Behavioral WDL    Cognitive/Neuro/Behavioral WDL WDL

## 2023-10-03 NOTE — ED NOTES
Reviewed discharge instructions with pt. Answered all questions. Please see provider's note for assessment.

## 2023-10-03 NOTE — ED PROVIDER NOTES
"EMERGENCY DEPARTMENT ENCOUNTER       ED Course & Medical Decision Making     1048-I met with the patient, obtained history, performed an initial exam, and discussed options and plan for diagnostics and treatment here in the ED.   1254-I checked on the patient.    Final Impression  38 year old female presents for evaluation of feeling that she has a piece of food stuck in her throat.  Was eating some steak last night, felt a small piece of it get stuck somewhere in her esophagus and has been having ongoing discomfort since that time.  Patient able to tolerate secretions as well as liquids including carbonated beverages which she tried to drink to help to dislodge it, eventually went to bed still having some discomfort and it was unchanged this morning which prompted her visit to the ED as it had not improved much since yesterday evening.  Patient does not appear to have a complete esophageal obstruction as she is not spitting into a bag, able to tolerate liquids.  Discussed relevant pathophysiology and did some diagramming on the white board of complete esophageal obstruction, partial obstruction, esophageal excoriation from temporarily retained foreign body, discussed other possible causes like eosinophilic esophagitis, GERD, esophageal strictures and the need for possible upper endoscopy in the near future on an outpatient basis which patient expressed understanding and agreement with.  Patient was given EZ gas and glucagon and felt some significant improvement though still felt like she had a \"scratch\" in her esophagus, tried a GI cocktail which relieved most of this.  Patient looking and feeling much better on final reevaluation, pain mostly resolved, discussed the need to follow-up with [facility] for upper endoscopy in the next few weeks, will start on brief course of omeprazole for the time being until she can seen by GI.    Prior to making a final disposition on this patient the results of patient's tests and " other diagnostic studies were discussed with the patient. All questions were answered. Patient expressed understanding of the plan and was amenable to it.    Medical Decision Making    History:  Supplemental history from: NA  External Record(s) reviewed as documented below;  3/10/2022 [facility] note, seen for left ear pain, throat pain, prescribed amoxicillin    Work Up:  Chart documentation includes differential considered and any EKGs or imaging independently interpreted by provider, where specified.  DDx considered but not limited to: Esophageal obstruction, esophageal excoriation, esophageal food bolus, esophageal stricture, GERD    Complicating factors:  Care impacted by chronic illness: Anxiety  Care affected by social determinants of health: N/A    Disposition considerations: Discharge. I prescribed additional prescription strength medication(s) as charted. I considered admission, but discharged patient after significant clinical improvement.      Medications   glucagon injection 1 mg (1 mg Intravenous $Given 10/3/23 2797)   sod bicarbonate-citric acid-simethicone (EZ GAS) 2.21-1.53-0.04 g packet 4 g (4 g Oral $Given 10/3/23 6698)   alum & mag hydroxide-simethicone (MAALOX) suspension 15 mL (15 mLs Oral $Given 10/3/23 4587)   lidocaine (viscous) (XYLOCAINE) 2 % solution 10 mL (10 mLs Mouth/Throat $Given 10/3/23 6504)       New Prescriptions    OMEPRAZOLE (PRILOSEC) 20 MG DR CAPSULE    Take 1 capsule (20 mg) by mouth daily       Final Impression     1. Esophageal pain      Chief Complaint     Chief Complaint   Patient presents with    Swallowed Foreign Body     Pt arrives to ED with c/o a piece of steak caught in her throat since last night. Pt has been drinking carbonated drinks and thought it helped but pt today still feels something stuck. Able to tolerate drinks has not have any solid foods today. No issues with breathing no chest pain. No cough. Pt feels like she needs to keep  "clearing her throat.     HPI     Arlyn Escalera is a 38 year old female who presents for evaluation of swallowed foreign body.    The patient ate steak last night around 19:00 and felt a piece of steak was stuck in her throat. She took an corby henrry afterwards without any relief. The patient head to bed and woke up this morning with the stuck sensation still present. She was able to drink liquids this morning without any complications. She denies having a history of food obstruction in her esophagus.    Othewrise, the patient denied having any other medical complaitns or cocnerns at this time.    I, Jaden Early am serving as a scribe to document services personally performed by Dr. Jed Aldrich MD, based on my observation and the provider's statements to me. I, Dr. Jed Aldrich MD attest that Jaden Early is acting in a scribe capacity, has observed my performance of the services and has documented them in accordance with my direction.    Physical Exam     BP (!) 141/89   Pulse 93   Temp 98  F (36.7  C) (Temporal)   Resp 16   Ht 1.6 m (5' 3\")   Wt 78 kg (172 lb)   SpO2 97%   BMI 30.47 kg/m    Constitutional: Awake, alert, in no acute distress.  Head: Normocephalic, atraumatic.  ENT: Mucous membranes moist.  Posterior oropharynx appears normal.  No trismus.  Tolerating secretions without difficulty.  Able to swallow secretions.  Eyes: Conjunctiva normal.  Respiratory: Respirations even, unlabored, in no acute respiratory distress.  Cardiovascular: Regular rate and rhythm. Good peripheral perfusion.  GI: Abdomen soft, non-tender.  Musculoskeletal: Moves all 4 extremities equally.  Integument: Warm, dry.  Neurologic: Alert & oriented x 3. Normal speech. Grossly normal motor and sensory function. No focal deficits noted.  Psychiatric: Normal mood    Labs & Imaging           Jed Aldrich MD  10/03/23 1309    "

## 2023-10-10 ENCOUNTER — OFFICE VISIT (OUTPATIENT)
Dept: CARDIOLOGY | Facility: CLINIC | Age: 38
End: 2023-10-10
Payer: COMMERCIAL

## 2023-10-10 VITALS
WEIGHT: 170.4 LBS | OXYGEN SATURATION: 98 % | BODY MASS INDEX: 30.19 KG/M2 | DIASTOLIC BLOOD PRESSURE: 80 MMHG | HEIGHT: 63 IN | SYSTOLIC BLOOD PRESSURE: 116 MMHG | RESPIRATION RATE: 16 BRPM | HEART RATE: 85 BPM

## 2023-10-10 DIAGNOSIS — Z82.49 FAMILY HISTORY OF ISCHEMIC HEART DISEASE: Primary | ICD-10-CM

## 2023-10-10 LAB
APO A-I SERPL-MCNC: 56 MG/DL
CRP SERPL HS-MCNC: 2.46 MG/L

## 2023-10-10 PROCEDURE — 36415 COLL VENOUS BLD VENIPUNCTURE: CPT | Performed by: INTERNAL MEDICINE

## 2023-10-10 PROCEDURE — 83695 ASSAY OF LIPOPROTEIN(A): CPT | Performed by: INTERNAL MEDICINE

## 2023-10-10 PROCEDURE — 86141 C-REACTIVE PROTEIN HS: CPT | Performed by: INTERNAL MEDICINE

## 2023-10-10 PROCEDURE — 99204 OFFICE O/P NEW MOD 45 MIN: CPT | Performed by: INTERNAL MEDICINE

## 2023-10-10 NOTE — LETTER
10/10/2023    Nilam Leger, IVA CNP  [address] [place] [address]    RE: Arlyn Escalera       Dear Colleague,     I had the pleasure of seeing Arlyn Escalera in the [facility].    HEART CARE ENCOUNTER CONSULTATON NOTE      [facility]  [phone]      Assessment/Recommendations   Assessment:  Preventative cardiac visit: Blood pressure well controlled.  Recent lipids reviewed and are very well controlled as well.  Discussed further risk assessment.  We will proceed with lipoprotein a and CRP evaluation.  Continue with regular exercise and diet.  We also discussed that given her complications with her last pregnancy she will be at risk with another pregnancy and would recommend seeing an OB and being cared for at higher level care center.  Family history of heart disease    Plan:  Lipoprotein A and crp  Follow up as needed       History of Present Illness/Subjective    HPI: Arlyn Escalera is a 38 year old female with history of gestational hypertension, family history of premature coronary artery disease who I am seeing today for a preventative visit.  He is concerned about her family history.  Her father had a heart attack at age 50.  Her paternal grandfather  of heart disease around 70 years old.  Her paternal aunts had heart attacks in their 50s.  She had gestational hypertension with her second child and underwent emergent  with late term gestation.  Required transfusion for hemoglobin of 6.1.  Shortly thereafter readmitted with chest pain BNP elevated to 346 received a dose of IV Lasix.  Echocardiogram showed no evidence for structural heart disease at that time.  Shortly thereafter blood pressure stabilized and she is no longer on any antihypertensives.  She is very active.  She does [place] theory 8 times a month.  She is active with her 2 children.  She plays hockey.  She feels great with activity.  Non-smoker.  No significant  "alcohol intake.      Echocardiogram 6/10/2021  1. Normal left ventricular size and systolic performance with a visually estimated ejection fraction of 55-60%.   2. No significant valvular heart disease is identified on this study.   3. Normal right ventricular size and systolic performance.      Physical Examination  Review of Systems   Vitals: /80 (BP Location: Right arm, Patient Position: Sitting, Cuff Size: Adult Regular)   Pulse 85   Resp 16   Ht 1.6 m (5' 3\")   Wt 77.3 kg (170 lb 6.4 oz)   SpO2 98%   BMI 30.19 kg/m    BMI= Body mass index is 30.19 kg/m .  Wt Readings from Last 3 Encounters:   10/10/23 77.3 kg (170 lb 6.4 oz)   10/03/23 78 kg (172 lb)   22 77.1 kg (170 lb)       General Appearance:   no distress, normal body habitus   ENT/Mouth: membranes moist, no oral lesions or bleeding gums.      EYES:  no scleral icterus, normal conjunctivae   Neck: no carotid bruits or thyromegaly   Chest/Lungs:   lungs are clear to auscultation   Cardiovascular:   Regular. Normal first and second heart sounds with no murmur no edema bilaterally    Abdomen:  no organomegaly, masses, bruits, or tenderness; bowel sounds are present   Extremities: no cyanosis or clubbing   Skin: no xanthelasma, warm.    Neurologic: normal  bilateral, no tremors     Psychiatric: alert and oriented x3, calm        Please refer above for cardiac ROS details.        Medical History  Surgical History Family History Social History   Past Medical History:   Diagnosis Date    Anxiety     mostly in college    Gestational hypertension, third trimester 2019    S/P primary low transverse      Spleen laceration 2002    Playing hockey    Varicella     Chickenpox as a child     Past Surgical History:   Procedure Laterality Date    APPENDECTOMY  2000     SECTION N/A 2021    Procedure:  SECTION;  Surgeon: Fabi Buckley DO;  Location: [facility] OR;  Service: Obstetrics    DILATION " AND CURETTAGE      OTHER SURGICAL HISTORY  01/01/2001    Lacerated spleen repairPlaying hockey     Family History   Problem Relation Age of Onset    Thyroid Disease Mother     C.A.D. Father         MI mild at age 50    Seasonal/Environmental Allergies Father     Coronary Artery Disease Father 50    Heart Disease Father     Hypertension Father     Alcoholism Brother     Cancer Maternal Grandfather     Alcoholism Paternal Grandfather     Heart Disease Paternal Grandfather     Skin Cancer No family hx of     Glaucoma No family hx of     Macular Degeneration No family hx of     Melanoma No family hx of         Social History     Socioeconomic History    Marital status:      Spouse name: Tan    Number of children: 1    Years of education: College Grad    Highest education level: Not on file   Occupational History    Occupation:      Employer: [employer]   Tobacco Use    Smoking status: Never    Smokeless tobacco: Never   Vaping Use    Vaping Use: Never used   Substance and Sexual Activity    Alcohol use: Yes     Comment: Alcoholic Drinks/day: rare    Drug use: No    Sexual activity: Yes     Partners: Male     Comment: single   Other Topics Concern    Parent/sibling w/ CABG, MI or angioplasty before 65F 55M? No   Social History Narrative    Social Documentation:  Has a dog        Balanced Diet: YES    Calcium intake: 2-3 per day    Caffeine: 1-2 per day    Exercise:  type of activity cardio;  3-4 times per week    Sunscreen: Yes    Seatbelts:  Yes    Self Breast Exam:  No    Self Testicular Exam: na    Physical/Emotional/Sexual Abuse: No    Do you feel safe in your environment? Yes        Cholesterol screen up to date: no fam hx    Eye Exam up to date: Yes    Dental Exam up to date: Yes    Pap smear up to date: Do today.  Last was 2008, nml    Mammogram up to date: Does Not Apply    Dexa Scan up to date: Does Not Apply    Colonoscopy up to date: Does Not Apply    Immunizations up to date: unsure  of when    Glucose screen if over 40:  na                     Social Determinants of Health     Financial Resource Strain: Not on file   Food Insecurity: Not on file   Transportation Needs: Not on file   Physical Activity: Not on file   Stress: Not on file   Social Connections: Not on file   Interpersonal Safety: Not on file   Housing Stability: Not on file           Medications  Allergies   No current outpatient medications on file.       Allergies   Allergen Reactions    Keppra Unknown    Levetiracetam Unknown    Morphine [Morphine Sulfate] Unknown          Lab Results    Chemistry/lipid CBC Cardiac Enzymes/BNP/TSH/INR   Recent Labs   Lab Test 09/19/23  1146   CHOL 139   HDL 53   LDL 66   TRIG 102     Recent Labs   Lab Test 09/19/23  1146 03/15/22  0745   LDL 66 65     Recent Labs   Lab Test 03/15/22  0745 06/18/21  0438   NA  --  140   POTASSIUM  --  3.9   CHLORIDE  --  110*   CO2  --  22   GLC 85 129*   BUN  --  16   CR  --  0.78   GFRESTIMATED  --  >60   CORY  --  8.2*     Recent Labs   Lab Test 06/18/21  0438 06/18/21  0000 06/15/21  1343   CR 0.78 0.78 0.76     Recent Labs   Lab Test 09/19/23  1146 11/04/20  1555   A1C 5.2 5.0          Recent Labs   Lab Test 03/15/22  0745 06/18/21  1341 06/18/21  0438   WBC  --   --  14.5*   HGB 14.1   < > 9.3*   HCT  --   --  28.9*   MCV  --   --  85   PLT  --   --  601*    < > = values in this interval not displayed.     Recent Labs   Lab Test 03/15/22  0745 07/15/21  1142 06/18/21  1341   HGB 14.1 11.6* 9.4*    Recent Labs   Lab Test 06/10/21  0600 06/10/21  0310   TROPONINI <0.01 <0.01     Recent Labs   Lab Test 06/10/21  0310   *     Recent Labs   Lab Test 11/04/20  1555   TSH 1.52     Recent Labs   Lab Test 06/18/21  0438 06/18/21  0000 06/27/19  0651   INR 1.04 1.04 0.87*        Pearl Valencia MD      Thank you for allowing me to participate in the care of your patient.      Sincerely,     Pearl Valencia MD     [facility]  [facility]  cc:   Referred Self,

## 2023-10-10 NOTE — PROGRESS NOTES
HEART CARE ENCOUNTER CONSULTATON NOTE      [facility]  [phone]      Assessment/Recommendations   Assessment:  Preventative cardiac visit: Blood pressure well controlled.  Recent lipids reviewed and are very well controlled as well.  Discussed further risk assessment.  We will proceed with lipoprotein a and CRP evaluation.  Continue with regular exercise and diet.  We also discussed that given her complications with her last pregnancy she will be at risk with another pregnancy and would recommend seeing an OB and being cared for at higher level care center.  Family history of heart disease    Plan:  Lipoprotein A and crp  Follow up as needed       History of Present Illness/Subjective    HPI: Arlyn Escalera is a 38 year old female with history of gestational hypertension, family history of premature coronary artery disease who I am seeing today for a preventative visit.  He is concerned about her family history.  Her father had a heart attack at age 50.  Her paternal grandfather  of heart disease around 70 years old.  Her paternal aunts had heart attacks in their 50s.  She had gestational hypertension with her second child and underwent emergent  with late term gestation.  Required transfusion for hemoglobin of 6.1.  Shortly thereafter readmitted with chest pain BNP elevated to 346 received a dose of IV Lasix.  Echocardiogram showed no evidence for structural heart disease at that time.  Shortly thereafter blood pressure stabilized and she is no longer on any antihypertensives.  She is very active.  She does [place] theory 8 times a month.  She is active with her 2 children.  She plays hockey.  She feels great with activity.  Non-smoker.  No significant alcohol intake.      Echocardiogram 6/10/2021  1. Normal left ventricular size and systolic performance with a visually estimated ejection fraction of 55-60%.   2. No significant valvular heart disease is identified on this  "study.   3. Normal right ventricular size and systolic performance.      Physical Examination  Review of Systems   Vitals: /80 (BP Location: Right arm, Patient Position: Sitting, Cuff Size: Adult Regular)   Pulse 85   Resp 16   Ht 1.6 m (5' 3\")   Wt 77.3 kg (170 lb 6.4 oz)   SpO2 98%   BMI 30.19 kg/m    BMI= Body mass index is 30.19 kg/m .  Wt Readings from Last 3 Encounters:   10/10/23 77.3 kg (170 lb 6.4 oz)   10/03/23 78 kg (172 lb)   22 77.1 kg (170 lb)       General Appearance:   no distress, normal body habitus   ENT/Mouth: membranes moist, no oral lesions or bleeding gums.      EYES:  no scleral icterus, normal conjunctivae   Neck: no carotid bruits or thyromegaly   Chest/Lungs:   lungs are clear to auscultation   Cardiovascular:   Regular. Normal first and second heart sounds with no murmur no edema bilaterally    Abdomen:  no organomegaly, masses, bruits, or tenderness; bowel sounds are present   Extremities: no cyanosis or clubbing   Skin: no xanthelasma, warm.    Neurologic: normal  bilateral, no tremors     Psychiatric: alert and oriented x3, calm        Please refer above for cardiac ROS details.        Medical History  Surgical History Family History Social History   Past Medical History:   Diagnosis Date    Anxiety     mostly in college    Gestational hypertension, third trimester 2019    S/P primary low transverse      Spleen laceration 2002    Playing hockey    Varicella     Chickenpox as a child     Past Surgical History:   Procedure Laterality Date    APPENDECTOMY  2000     SECTION N/A 2021    Procedure:  SECTION;  Surgeon: Fabi Buckley DO;  Location: [facility]+D OR;  Service: Obstetrics    DILATION AND CURETTAGE      OTHER SURGICAL HISTORY  2001    Lacerated spleen repairPlaying hockey     Family History   Problem Relation Age of Onset    Thyroid Disease Mother     C.A.D. Father         MI mild at age 50    " Seasonal/Environmental Allergies Father     Coronary Artery Disease Father 50    Heart Disease Father     Hypertension Father     Alcoholism Brother     Cancer Maternal Grandfather     Alcoholism Paternal Grandfather     Heart Disease Paternal Grandfather     Skin Cancer No family hx of     Glaucoma No family hx of     Macular Degeneration No family hx of     Melanoma No family hx of         Social History     Socioeconomic History    Marital status:      Spouse name: Tan    Number of children: 1    Years of education: College Grad    Highest education level: Not on file   Occupational History    Occupation:      Employer: [employer]   Tobacco Use    Smoking status: Never    Smokeless tobacco: Never   Vaping Use    Vaping Use: Never used   Substance and Sexual Activity    Alcohol use: Yes     Comment: Alcoholic Drinks/day: rare    Drug use: No    Sexual activity: Yes     Partners: Male     Comment: single   Other Topics Concern    Parent/sibling w/ CABG, MI or angioplasty before 65F 55M? No   Social History Narrative    Social Documentation:  Has a dog        Balanced Diet: YES    Calcium intake: 2-3 per day    Caffeine: 1-2 per day    Exercise:  type of activity cardio;  3-4 times per week    Sunscreen: Yes    Seatbelts:  Yes    Self Breast Exam:  No    Self Testicular Exam: na    Physical/Emotional/Sexual Abuse: No    Do you feel safe in your environment? Yes        Cholesterol screen up to date: no fam hx    Eye Exam up to date: Yes    Dental Exam up to date: Yes    Pap smear up to date: Do today.  Last was 2008, nml    Mammogram up to date: Does Not Apply    Dexa Scan up to date: Does Not Apply    Colonoscopy up to date: Does Not Apply    Immunizations up to date: unsure of when    Glucose screen if over 40:  na                     Social Determinants of Health     Financial Resource Strain: Not on file   Food Insecurity: Not on file   Transportation Needs: Not on file   Physical  Activity: Not on file   Stress: Not on file   Social Connections: Not on file   Interpersonal Safety: Not on file   Housing Stability: Not on file           Medications  Allergies   No current outpatient medications on file.       Allergies   Allergen Reactions    Keppra Unknown    Levetiracetam Unknown    Morphine [Morphine Sulfate] Unknown          Lab Results    Chemistry/lipid CBC Cardiac Enzymes/BNP/TSH/INR   Recent Labs   Lab Test 09/19/23  1146   CHOL 139   HDL 53   LDL 66   TRIG 102     Recent Labs   Lab Test 09/19/23  1146 03/15/22  0745   LDL 66 65     Recent Labs   Lab Test 03/15/22  0745 06/18/21  0438   NA  --  140   POTASSIUM  --  3.9   CHLORIDE  --  110*   CO2  --  22   GLC 85 129*   BUN  --  16   CR  --  0.78   GFRESTIMATED  --  >60   CORY  --  8.2*     Recent Labs   Lab Test 06/18/21  0438 06/18/21  0000 06/15/21  1343   CR 0.78 0.78 0.76     Recent Labs   Lab Test 09/19/23  1146 11/04/20  1555   A1C 5.2 5.0          Recent Labs   Lab Test 03/15/22  0745 06/18/21  1341 06/18/21  0438   WBC  --   --  14.5*   HGB 14.1   < > 9.3*   HCT  --   --  28.9*   MCV  --   --  85   PLT  --   --  601*    < > = values in this interval not displayed.     Recent Labs   Lab Test 03/15/22  0745 07/15/21  1142 06/18/21  1341   HGB 14.1 11.6* 9.4*    Recent Labs   Lab Test 06/10/21  0600 06/10/21  0310   TROPONINI <0.01 <0.01     Recent Labs   Lab Test 06/10/21  0310   *     Recent Labs   Lab Test 11/04/20  1555   TSH 1.52     Recent Labs   Lab Test 06/18/21  0438 06/18/21  0000 06/27/19  0651   INR 1.04 1.04 0.87*        Pearl Valencia MD

## 2024-09-24 ENCOUNTER — VIRTUAL VISIT (OUTPATIENT)
Dept: PEDIATRICS | Facility: CLINIC | Age: 39
End: 2024-09-24
Payer: COMMERCIAL

## 2024-09-24 DIAGNOSIS — J30.2 SEASONAL ALLERGIES: ICD-10-CM

## 2024-09-24 DIAGNOSIS — J01.90 ACUTE SINUSITIS WITH SYMPTOMS > 10 DAYS: Primary | ICD-10-CM

## 2024-09-24 PROCEDURE — 99441 PR PHYSICIAN TELEPHONE EVALUATION 5-10 MIN: CPT | Mod: 93 | Performed by: NURSE PRACTITIONER

## 2024-09-24 NOTE — PROGRESS NOTES
Arlyn is a 39 year old who is being evaluated via a billable telephone visit.    How would you like to obtain your AVS? MyChart  If the video visit is dropped, the invitation should be resent by: Text to cell phone: [phone]  Will anyone else be joining your video visit? No  Originating Location (pt. Location): Other ***  {PROVIDER LOCATION On-site should be selected for visits conducted from your clinic location or adjoining [facility] hospital, academic office, or other nearby [facility] building. Off-site should be selected for all other provider locations, including home:358127}  Distant Location (provider location):  {virtual location provider:414516}    {PROVIDER CHARTING PREFERENCE:521302}    Subjective   Arlyn is a 39 year old, presenting for the following health issues:  URI      9/24/2024    11:29 AM   Additional Questions   Roomed by mf   Accompanied by self         9/24/2024    11:21 AM   Patient Reported Additional Medications   Patient reports taking the following new medications na     History of Present Illness       Reason for visit:  URI  Symptom onset:  1-2 weeks ago  Symptoms include:  Nasal congestion, post nasal drainage, productive cough with flem  Symptom intensity:  Moderate  Symptom progression:  Staying the same  Had these symptoms before:  No  What makes it worse:  None  What makes it better:  Dayquil and Nyquil short term relief   She is taking medications regularly.       {SUPERLIST (Optional):480635}  {additonal problems for provider to add (Optional):287273}    {ROS Picklists (Optional):502375}      Objective           Vitals:  No vitals were obtained today due to virtual visit.    Physical Exam   General: Alert and no distress //Respiratory: No audible wheeze, cough, or shortness of breath // Psychiatric:  Appropriate affect, tone, and pace of words      {Diagnostic Test Results (Optional):974194}      Phone call duration: *** minutes  Signed Electronically by: Britney Olson NP  {Email  feedback regarding this note to [email or web address]   :[id number]}

## 2024-09-24 NOTE — PATIENT INSTRUCTIONS
"Add zyrtec  Mucinex \"d\"=sudafed product can be helpful too  If not better in 2 days, can do the antibiotic    "

## 2024-09-24 NOTE — PROGRESS NOTES
"Arlyn is a 39 year old who is being evaluated via a billable telephone visit.      Originating Location (pt. Location): Home    Distant Location (provider location):  On-site    Assessment & Plan     Acute sinusitis with symptoms > 10 days  If not improving with addition of zyrtec (can also try mucinex and/or sudafed), then recommend starting abx.   - amoxicillin-clavulanate (AUGMENTIN) 875-125 MG tablet; Take 1 tablet by mouth 2 times daily for 7 days.    Seasonal allergies  Recommend continue and add zyrtec.    BP Readings from Last 3 Encounters:   10/10/23 116/80   10/03/23 123/75   07/25/22 131/83           BMI  Estimated body mass index is 30.19 kg/m  as calculated from the following:    Height as of 10/10/23: 1.6 m (5' 3\").    Weight as of 10/10/23: 77.3 kg (170 lb 6.4 oz).             Subjective   Arlyn is a 39 year old, presenting for the following health issues:  URI        9/24/2024    11:21 AM   Additional Questions   Roomed by abelino   Accompanied by na         9/24/2024    11:21 AM   Patient Reported Additional Medications   Patient reports taking the following new medications na     History of Present Illness       Reason for visit:  URI  Symptom onset:  1-2 weeks ago  Symptoms include:  Nasal congestion, post nasal drainage, productive cough with flem  Symptom intensity:  Moderate  Symptom progression:  Staying the same  Had these symptoms before:  No  What makes it worse:  None  What makes it better:  Dayquil and Nyquil short term relief   She is taking medications regularly.     Pt with hx of seasonal allergies: uses flonase  Daughter was also sick with a cold  Continue to have congestion, nasal drip and productive mucus -worst in the mornings  >2 weeks of symptoms and not improving              Objective           Vitals:  No vitals were obtained today due to virtual visit.    Physical Exam   General: Alert and no distress //Respiratory: No audible wheeze, cough, or shortness of breath // " Psychiatric:  Appropriate affect, tone, and pace of words            Phone call duration: 5 minutes  Signed Electronically by: Britney Olson NP

## 2024-11-17 ENCOUNTER — HEALTH MAINTENANCE LETTER (OUTPATIENT)
Age: 39
End: 2024-11-17

## 2025-01-21 ENCOUNTER — NURSE TRIAGE (OUTPATIENT)
Dept: NURSING | Facility: CLINIC | Age: 40
End: 2025-01-21
Payer: COMMERCIAL

## 2025-01-22 NOTE — TELEPHONE ENCOUNTER
"Mirela [facility] (not currently pregnant).  A few hours ago (5:30pm) Ran car in garage for a couple of minutes before removing it from garage. Garage door was open at that time. Now she is worried about carbon monoxide exposure--how fast does it come on ? Anxiety about this. She is a little dizzy, and had a headache earlier. No headache now, but she still feels a little \"off\". She does not take anxiety medication. No difficulty breathing, no chest pain or tightness. No coughing.   Triaged to a disposition of Call local fire dept now. Also recommended contacting Poison Control center. She will call both now.   Robina Pagan RN Triage Nurse Advisor 9:15 PM 1/21/2025  Reason for Disposition   [1] SUSPECTED CO EXPOSURE (e.g., CO alarm sounded) AND [2] asymptomatic now (no CO poisoning symptoms)   CO poisoning symptoms, questions about    Additional Information   Negative: [1] Breathing stopped AND [2] hasn't returned   Negative: Bluish (or gray) lips or face now   Negative: Difficult to awaken or acting confused (e.g., disoriented, slurred speech)   Negative: Difficulty breathing   Negative: Chest pain or heaviness (present now)   Negative: Seizure   Negative: Patient attempted suicide   Negative: Sounds like a life-threatening emergency to the triager   Negative: [1] KNOWN CO EXPOSURE (e.g., other victims with CO poisoning) within past 24 hours AND [2] CO poisoning symptoms present now   Negative: [1] SUSPECTED CO EXPOSURE (e.g., CO alarm sounded) within past 24 hours AND [2] CO poisoning symptoms present now   Negative: [1] KNOWN CO EXPOSURE (e.g., other victims with CO poisoning) within past 24 hours AND [2] any CO poisoning symptoms since exposure (but asymptomatic now)   Negative: Triager unable to answer question   Negative: Patient sounds very sick or weak to the triager   Negative: [1] KNOWN CO EXPOSURE (e.g., other victims with CO poisoning) more than 24 hours ago AND [2] CO poisoning symptoms present " now   Negative: [1] SUSPECTED CO EXPOSURE (e.g., CO alarm sounded) more than 24 hours agoAND [2] CO poisoning symptoms present now    Protocols used: Carbon Monoxide Exposure-A-AH